# Patient Record
Sex: FEMALE | Race: WHITE | NOT HISPANIC OR LATINO | Employment: OTHER | URBAN - METROPOLITAN AREA
[De-identification: names, ages, dates, MRNs, and addresses within clinical notes are randomized per-mention and may not be internally consistent; named-entity substitution may affect disease eponyms.]

---

## 2017-01-25 RX ORDER — INSULIN PUMP SYRINGE, 3 ML
EACH MISCELLANEOUS
Qty: 1 EACH | Refills: 0 | Status: SHIPPED | OUTPATIENT
Start: 2017-01-25

## 2017-01-25 NOTE — TELEPHONE ENCOUNTER
----- Message from Yaima Joseph sent at 1/25/2017 10:00 AM CST -----  Would like to speak to nurse about diabetic supplies. Please call back at [phone]. Thanks//cdb

## 2017-02-06 RX ORDER — METFORMIN HYDROCHLORIDE 500 MG/1
TABLET, EXTENDED RELEASE ORAL
Qty: 360 TABLET | Refills: 0 | Status: SHIPPED | OUTPATIENT
Start: 2017-02-06 | End: 2017-04-04 | Stop reason: SDUPTHER

## 2017-02-15 ENCOUNTER — CLINICAL SUPPORT (OUTPATIENT)
Dept: INTERNAL MEDICINE | Facility: CLINIC | Age: 72
End: 2017-02-15
Payer: MEDICARE

## 2017-02-15 DIAGNOSIS — E11.8 CONTROLLED TYPE 2 DIABETES MELLITUS WITH COMPLICATION, WITHOUT LONG-TERM CURRENT USE OF INSULIN: ICD-10-CM

## 2017-02-15 LAB
ANION GAP SERPL CALC-SCNC: 8 MMOL/L
BUN SERPL-MCNC: 13 MG/DL
CALCIUM SERPL-MCNC: 9.3 MG/DL
CHLORIDE SERPL-SCNC: 103 MMOL/L
CO2 SERPL-SCNC: 30 MMOL/L
CREAT SERPL-MCNC: 0.7 MG/DL
EST. GFR  (AFRICAN AMERICAN): >60 ML/MIN/1.73 M^2
EST. GFR  (NON AFRICAN AMERICAN): >60 ML/MIN/1.73 M^2
GLUCOSE SERPL-MCNC: 197 MG/DL
POTASSIUM SERPL-SCNC: 4.4 MMOL/L
SODIUM SERPL-SCNC: 141 MMOL/L

## 2017-02-15 PROCEDURE — 83036 HEMOGLOBIN GLYCOSYLATED A1C: CPT

## 2017-02-15 PROCEDURE — 36415 COLL VENOUS BLD VENIPUNCTURE: CPT | Mod: S$GLB,,, | Performed by: FAMILY MEDICINE

## 2017-02-15 PROCEDURE — 99999 PR PBB SHADOW E&M-EST. PATIENT-LVL I: CPT | Mod: PBBFAC,,,

## 2017-02-15 PROCEDURE — 80048 BASIC METABOLIC PNL TOTAL CA: CPT

## 2017-02-15 NOTE — PROGRESS NOTES
Labs drawn today, tolerated well. Informed will call back with results, verbalized understanding.

## 2017-02-16 LAB
ESTIMATED AVG GLUCOSE: 117 MG/DL
HBA1C MFR BLD HPLC: 5.7 %

## 2017-04-04 RX ORDER — METFORMIN HYDROCHLORIDE 500 MG/1
TABLET, EXTENDED RELEASE ORAL
Qty: 360 TABLET | Refills: 0 | Status: SHIPPED | OUTPATIENT
Start: 2017-04-04 | End: 2017-06-13 | Stop reason: SDUPTHER

## 2017-04-04 NOTE — TELEPHONE ENCOUNTER
I sent her refill to [facility] for her metformin.  She had blood work done in January and I said we did not need a recheck on the blood.  However she does need a visit as she is overdue for foot check and other diabetic benchmarks.    Please schedule the patient for a visit to review her recent labs and schedule health maintenance items.

## 2017-04-12 ENCOUNTER — OFFICE VISIT (OUTPATIENT)
Dept: INTERNAL MEDICINE | Facility: CLINIC | Age: 72
End: 2017-04-12
Payer: MEDICARE

## 2017-04-12 VITALS
WEIGHT: 162.06 LBS | BODY MASS INDEX: 25.38 KG/M2 | DIASTOLIC BLOOD PRESSURE: 71 MMHG | HEART RATE: 93 BPM | OXYGEN SATURATION: 94 % | TEMPERATURE: 98 F | SYSTOLIC BLOOD PRESSURE: 114 MMHG

## 2017-04-12 DIAGNOSIS — N32.81 OAB (OVERACTIVE BLADDER): ICD-10-CM

## 2017-04-12 DIAGNOSIS — N39.41 URGE URINARY INCONTINENCE: ICD-10-CM

## 2017-04-12 DIAGNOSIS — E78.2 MIXED HYPERLIPIDEMIA: ICD-10-CM

## 2017-04-12 DIAGNOSIS — E11.8 CONTROLLED TYPE 2 DIABETES MELLITUS WITH COMPLICATION, WITHOUT LONG-TERM CURRENT USE OF INSULIN: Primary | ICD-10-CM

## 2017-04-12 DIAGNOSIS — G47.00 INSOMNIA, UNSPECIFIED TYPE: ICD-10-CM

## 2017-04-12 DIAGNOSIS — E11.3293 CONTROLLED TYPE 2 DIABETES MELLITUS WITH BOTH EYES AFFECTED BY MILD NONPROLIFERATIVE RETINOPATHY WITHOUT MACULAR EDEMA, WITHOUT LONG-TERM CURRENT USE OF INSULIN: ICD-10-CM

## 2017-04-12 DIAGNOSIS — Z23 IMMUNIZATION DUE: ICD-10-CM

## 2017-04-12 PROCEDURE — 1160F RVW MEDS BY RX/DR IN RCRD: CPT | Mod: S$GLB,,, | Performed by: FAMILY MEDICINE

## 2017-04-12 PROCEDURE — 1126F AMNT PAIN NOTED NONE PRSNT: CPT | Mod: S$GLB,,, | Performed by: FAMILY MEDICINE

## 2017-04-12 PROCEDURE — 1159F MED LIST DOCD IN RCRD: CPT | Mod: S$GLB,,, | Performed by: FAMILY MEDICINE

## 2017-04-12 PROCEDURE — 3078F DIAST BP <80 MM HG: CPT | Mod: S$GLB,,, | Performed by: FAMILY MEDICINE

## 2017-04-12 PROCEDURE — 3060F POS MICROALBUMINURIA REV: CPT | Mod: S$GLB,,, | Performed by: FAMILY MEDICINE

## 2017-04-12 PROCEDURE — 3044F HG A1C LEVEL LT 7.0%: CPT | Mod: S$GLB,,, | Performed by: FAMILY MEDICINE

## 2017-04-12 PROCEDURE — 3074F SYST BP LT 130 MM HG: CPT | Mod: S$GLB,,, | Performed by: FAMILY MEDICINE

## 2017-04-12 PROCEDURE — 1157F ADVNC CARE PLAN IN RCRD: CPT | Mod: S$GLB,,, | Performed by: FAMILY MEDICINE

## 2017-04-12 PROCEDURE — 99214 OFFICE O/P EST MOD 30 MIN: CPT | Mod: S$GLB,,, | Performed by: FAMILY MEDICINE

## 2017-04-12 PROCEDURE — G0009 ADMIN PNEUMOCOCCAL VACCINE: HCPCS | Mod: S$GLB,,, | Performed by: FAMILY MEDICINE

## 2017-04-12 PROCEDURE — 90670 PCV13 VACCINE IM: CPT | Mod: S$GLB,,, | Performed by: FAMILY MEDICINE

## 2017-04-12 PROCEDURE — 99999 PR PBB SHADOW E&M-EST. PATIENT-LVL IV: CPT | Mod: PBBFAC,,, | Performed by: FAMILY MEDICINE

## 2017-04-12 RX ORDER — TRAZODONE HYDROCHLORIDE 50 MG/1
50 TABLET ORAL NIGHTLY
Qty: 30 TABLET | Refills: 5 | Status: SHIPPED | OUTPATIENT
Start: 2017-04-12 | End: 2017-04-12 | Stop reason: SDUPTHER

## 2017-04-12 RX ORDER — TRAZODONE HYDROCHLORIDE 50 MG/1
50 TABLET ORAL NIGHTLY
Qty: 90 TABLET | Refills: 1 | Status: SHIPPED | OUTPATIENT
Start: 2017-04-12 | End: 2017-09-15 | Stop reason: SDUPTHER

## 2017-04-12 NOTE — PROGRESS NOTES
Subjective:       Patient ID: Connie Fields is a 71 y.o. female.    Chief Complaint: Annual Exam and Diabetic Foot Exam    HPI Comments: Here for recheck with labs from Feb for DM2 - she will check on vision status with eye doc and request copy sent.  Last record in the chart is from Dr. Anatoliy Espinoza, OD, 41/22/15.  At that time she was found to have mild nonproliferative diabetic retinopathy bilaterally OS>OD, along with age-related cataracts and age-related macular degeneration bilaterally.  Also get her gyn test results form 2016.  She started probiotics and has had great success with the diarrhea - so she stayed on full dose of metformin. A1c remains within normal range.     Labs reviewed:  Lab Results       Component                Value               Date                       WBC                      7.91                11/16/2016                 HGB                      14.5                11/16/2016                 HCT                      42.6                11/16/2016                 PLT                      287                 11/16/2016                 CHOL                     150                 07/29/2016                 TRIG                     160 (H)             07/29/2016                 HDL                      44                  07/29/2016                 LDL                      74                  07/29/16       ALT                      20                  11/16/2016                 AST                      23                  11/16/2016                 NA                       141                 02/15/2017                 K                        4.4                 02/15/2017                 CL                       103                 02/15/2017                 CREATININE               0.7                 02/15/2017                 BUN                      13                  02/15/2017                 CO2                      30 (H)              02/15/2017                 HGBA1C                    5.7                 02/15/2017                    Diabetes   She presents for her follow-up diabetic visit. She has type 2 diabetes mellitus. There are no hypoglycemic associated symptoms. There are no diabetic associated symptoms. Pertinent negatives for diabetes include no foot ulcerations and no polyuria. There are no hypoglycemic complications. Symptoms are stable. Current diabetic treatment includes oral agent (monotherapy). She is following a generally healthy diet. There is no change in her home blood glucose trend. Her breakfast blood glucose range is generally 110-130 mg/dl. An ACE inhibitor/angiotensin II receptor blocker is being taken. Eye exam is current.     Review of Systems   Endocrine: Negative for polyuria.   Genitourinary: Positive for enuresis. Negative for dysuria.   Neurological: Positive for syncope (a month ago at double header - checked by EMS - , BP OK). Negative for numbness.       Objective:      Physical Exam   Constitutional: She is oriented to person, place, and time. She appears well-developed and well-nourished.   HENT:   Head: Normocephalic and atraumatic.   Right Ear: External ear normal.   Left Ear: External ear normal.   Mouth/Throat: Oropharynx is clear and moist. No oropharyngeal exudate.   Neck: Normal range of motion. Neck supple.   Cardiovascular: Normal rate, regular rhythm and normal heart sounds.    Pulses:       Dorsalis pedis pulses are 2+ on the right side, and 2+ on the left side.        Posterior tibial pulses are 1+ on the right side, and 1+ on the left side.   Pulmonary/Chest: Effort normal and breath sounds normal.   Abdominal: Soft. Bowel sounds are normal. She exhibits no distension. There is no tenderness.   Musculoskeletal:        Right foot: There is normal range of motion and no deformity.        Left foot: There is normal range of motion and no deformity.   Feet:   Right Foot:   Protective Sensation: 10 sites tested. 10 sites sensed.    Skin Integrity: Negative for ulcer or skin breakdown.   Left Foot:   Protective Sensation: 10 sites tested. 10 sites sensed.   Skin Integrity: Negative for ulcer or skin breakdown.   Neurological: She is alert and oriented to person, place, and time.   Skin: Skin is warm and dry.   Psychiatric: She has a normal mood and affect. Her behavior is normal.         Assessment/Plan:     1. Controlled type 2 diabetes mellitus with complication, without long-term current use of insulin  Hemoglobin A1c    Microalbumin/creatinine urine ratio    Comprehensive metabolic panel   2. Urge urinary incontinence  Ambulatory referral to Urology   3. OAB (overactive bladder)  Ambulatory referral to Urology   4. Immunization due  Pneumococcal Conjugate Vaccine (13 Valent) (IM)   5. Insomnia, unspecified type  trazodone (DESYREL) 50 MG tablet   6. Mixed hyperlipidemia  Lipid panel   continue all current meds and recheck with 6 month labs in Augst.   New start trazodone - start with one half tab nightly for sleep onset.  She declines further immunizations other than Prevnar today.

## 2017-04-12 NOTE — MR AVS SNAPSHOT
[facility] [address]  [place] [address]  Phone: [phone]  Fax: [phone]                  Connie Fields   2017 10:40 AM   Office Visit    Description:  Female : 1945   Provider:  Ivana Ash MD   Department:  [facility]           Reason for Visit     Annual Exam     Diabetic Foot Exam           Diagnoses this Visit        Comments    Controlled type 2 diabetes mellitus with complication, without long-term current use of insulin    -  Primary     Urge urinary incontinence         OAB (overactive bladder)         Immunization due         Insomnia, unspecified type         Mixed hyperlipidemia                To Do List           Goals (5 Years of Data)     None       These Medications        Disp Refills Start End    trazodone (DESYREL) 50 MG tablet 30 tablet 5 2017    Take 1 tablet (50 mg total) by mouth every evening. - Oral    Pharmacy: [facility] - [address] Ph #: [phone]         [facility]     [facility] On Call Nurse Care Line -  Assistance  Unless otherwise directed by your provider, please contact [facility], our nurse care line that is available for  assistance.     Registered nurses in the [facility] Center provide: appointment scheduling, clinical advisement, health education, and other advisory services.  Call: [phone] (toll free)               Medications           START taking these NEW medications        Refills    trazodone (DESYREL) 50 MG tablet 5    Sig: Take 1 tablet (50 mg total) by mouth every evening.    Class: Normal    Route: Oral           Verify that the below list of medications is an accurate representation of the medications you are currently taking.  If none reported, the list may be blank. If incorrect, please contact your healthcare provider. Carry this list with you in case of emergency.           Current  Medications     aspirin (ECOTRIN) 81 MG EC tablet Take 81 mg by mouth once daily.    b complex vitamins capsule Take 1 capsule by mouth once daily.    blood sugar diagnostic (BLOOD GLUCOSE TEST) Strp 1 each by Misc.(Non-Drug; Combo Route) route once daily. One touch verio IQ    blood-glucose meter kit Use as instructed    cholecalciferol, vitamin D3, (VITAMIN D3) 1,000 unit capsule Take 1,000 Units by mouth once daily.    clonazePAM (KLONOPIN) 1 MG tablet 1/2 to 1 up to BID prn severe anxiety    hydrocodone-acetaminophen 7.5-325mg (NORCO) 7.5-325 mg per tablet TK 1 T PO  Q 4 TO 6 H PRN FOR SEVERE PAIN ONLY    Lactobacillus rhamnosus GG (CULTURELLE) 10 billion cell capsule Take 1 capsule by mouth once daily.    lancets (ONE TOUCH DELICA LANCETS) 33 gauge Misc 1 lancet by Misc.(Non-Drug; Combo Route) route 2 (two) times daily.    metformin (GLUCOPHAGE-XR) 500 MG 24 hr tablet TAKE 2 TABLETS TWICE DAILY WITH MEALS    multivitamin capsule Take 1 capsule by mouth once daily.    oxybutynin (DITROPAN) 5 MG Tab Take 1 tablet (5 mg total) by mouth 2 (two) times daily.    pantoprazole (PROTONIX) 40 MG tablet Take 1 tablet (40 mg total) by mouth 2 (two) times daily.    paroxetine (PAXIL) 30 MG tablet Take 1 tablet (30 mg total) by mouth every morning.    pravastatin (PRAVACHOL) 40 MG tablet Take 1 tablet (40 mg total) by mouth once daily.    trazodone (DESYREL) 50 MG tablet Take 1 tablet (50 mg total) by mouth every evening.           Clinical Reference Information           Your Vitals Were     BP Pulse Temp    114/71 (BP Location: Right arm, Patient Position: Sitting, BP Method: Automatic) 93 97.7 °F (36.5 °C) (Tympanic)    Weight SpO2 BMI    73.5 kg (162 lb 0.6 oz) 94% 25.38 kg/m2      Blood Pressure          Most Recent Value    BP  114/71      Allergies as of 4/12/2017     Hydrocodone      Immunizations Administered on Date of Encounter - 4/12/2017     Name Date Dose VIS Date Route    Pneumococcal Conjugate - 13 Valent  4/12/2017 0.5 mL 11/5/2015 Intramuscular      Orders Placed During Today's Visit      Normal Orders This Visit    Ambulatory referral to Urology     Pneumococcal Conjugate Vaccine (13 Valent) (IM)     Future Labs/Procedures Expected by Expires    Comprehensive metabolic panel  8/12/2017 (Approximate) 4/13/2018    Hemoglobin A1c  8/12/2017 (Approximate) 4/13/2018    Lipid panel  8/12/2017 (Approximate) 4/13/2018    Microalbumin/creatinine urine ratio  8/12/2017 4/12/2018      [facility] Sign-Up     Activating your [facility] account is as easy as 1-2-3!     1) Visit [email or web address], select Sign Up Now, enter this activation code and your date of birth, then select Next.  [email or web address]  Expires: 5/27/2017 12:39 PM      2) Create a username and password to use when you visit [facility] in the future and select a security question in case you lose your password and select Next.    3) Enter your e-mail address and click Sign Up!    Additional Information  If you have questions, please e-mail [email or web address] or call [phone] to talk to our [facility] staff. Remember, [facility] is NOT to be used for urgent needs. For medical emergencies, dial 911.         Language Assistance Services     ATTENTION: Language assistance services are available, free of charge. Please call [phone].      ATENCIÓN: Si habla español, tiene a swanson disposición servicios gratuitos de asistencia lingüística. Llame al [phone].     [facility] Ý: N?u b?n nói Ti?ng Vi?t, có các d?ch v? h? tr? ngôn ng? mi?n phí dành cho b?n. G?i s? [phone].         [facility] complies with applicable Federal civil rights laws and does not discriminate on the basis of race, color, national origin, age, disability, or sex.

## 2017-04-12 NOTE — PROGRESS NOTES
Instructed the patient to stay in the clinic for 15 minutes after the injections/ vaccine prevnar 13

## 2017-05-02 DIAGNOSIS — N39.46 MIXED STRESS AND URGE URINARY INCONTINENCE: ICD-10-CM

## 2017-05-02 RX ORDER — OXYBUTYNIN CHLORIDE 5 MG/1
TABLET ORAL
Qty: 180 TABLET | Refills: 1 | Status: SHIPPED | OUTPATIENT
Start: 2017-05-02 | End: 2018-02-06 | Stop reason: SDUPTHER

## 2017-06-13 RX ORDER — METFORMIN HYDROCHLORIDE 500 MG/1
TABLET, EXTENDED RELEASE ORAL
Qty: 360 TABLET | Refills: 3 | Status: SHIPPED | OUTPATIENT
Start: 2017-06-13 | End: 2018-03-22 | Stop reason: SDUPTHER

## 2017-08-09 ENCOUNTER — TELEPHONE (OUTPATIENT)
Dept: INTERNAL MEDICINE | Facility: CLINIC | Age: 72
End: 2017-08-09

## 2017-08-09 NOTE — TELEPHONE ENCOUNTER
Called pt to see about rescheduling her lab appointment. Pt rescheduled her appointment for 8/10/17 for 8:40 am

## 2017-08-10 ENCOUNTER — CLINICAL SUPPORT (OUTPATIENT)
Dept: INTERNAL MEDICINE | Facility: CLINIC | Age: 72
End: 2017-08-10
Payer: MEDICARE

## 2017-08-10 DIAGNOSIS — E78.2 MIXED HYPERLIPIDEMIA: ICD-10-CM

## 2017-08-10 DIAGNOSIS — E11.8 CONTROLLED TYPE 2 DIABETES MELLITUS WITH COMPLICATION, WITHOUT LONG-TERM CURRENT USE OF INSULIN: ICD-10-CM

## 2017-08-10 LAB
ALBUMIN SERPL BCP-MCNC: 3.9 G/DL
ALP SERPL-CCNC: 53 U/L
ALT SERPL W/O P-5'-P-CCNC: 16 U/L
ANION GAP SERPL CALC-SCNC: 10 MMOL/L
AST SERPL-CCNC: 23 U/L
BILIRUB SERPL-MCNC: 1.2 MG/DL
BUN SERPL-MCNC: 12 MG/DL
CALCIUM SERPL-MCNC: 9.9 MG/DL
CHLORIDE SERPL-SCNC: 102 MMOL/L
CHOLEST/HDLC SERPL: 3 {RATIO}
CO2 SERPL-SCNC: 28 MMOL/L
CREAT SERPL-MCNC: 0.8 MG/DL
CREAT UR-MCNC: 78 MG/DL
EST. GFR  (AFRICAN AMERICAN): >60 ML/MIN/1.73 M^2
EST. GFR  (NON AFRICAN AMERICAN): >60 ML/MIN/1.73 M^2
ESTIMATED AVG GLUCOSE: 114 MG/DL
GLUCOSE SERPL-MCNC: 111 MG/DL
HBA1C MFR BLD HPLC: 5.6 %
HDL/CHOLESTEROL RATIO: 32.8 %
HDLC SERPL-MCNC: 131 MG/DL
HDLC SERPL-MCNC: 43 MG/DL
LDLC SERPL CALC-MCNC: 49.6 MG/DL
MICROALBUMIN UR DL<=1MG/L-MCNC: 9 UG/ML
MICROALBUMIN/CREATININE RATIO: 11.5 UG/MG
NONHDLC SERPL-MCNC: 88 MG/DL
POTASSIUM SERPL-SCNC: 4.9 MMOL/L
PROT SERPL-MCNC: 6.7 G/DL
SODIUM SERPL-SCNC: 140 MMOL/L
TRIGL SERPL-MCNC: 192 MG/DL

## 2017-08-10 PROCEDURE — 83036 HEMOGLOBIN GLYCOSYLATED A1C: CPT

## 2017-08-10 PROCEDURE — 80053 COMPREHEN METABOLIC PANEL: CPT

## 2017-08-10 PROCEDURE — 99999 PR PBB SHADOW E&M-EST. PATIENT-LVL I: CPT | Mod: PBBFAC,,,

## 2017-08-10 PROCEDURE — 82570 ASSAY OF URINE CREATININE: CPT

## 2017-08-10 PROCEDURE — 36415 COLL VENOUS BLD VENIPUNCTURE: CPT | Mod: S$GLB,,, | Performed by: FAMILY MEDICINE

## 2017-08-10 PROCEDURE — 80061 LIPID PANEL: CPT

## 2017-08-23 ENCOUNTER — TELEPHONE (OUTPATIENT)
Dept: INTERNAL MEDICINE | Facility: CLINIC | Age: 72
End: 2017-08-23

## 2017-08-23 NOTE — TELEPHONE ENCOUNTER
----- Message from Sara Francis sent at 8/23/2017  8:37 AM CDT -----  Please call pt back about her test results...please call pt back at [phone].

## 2017-08-23 NOTE — TELEPHONE ENCOUNTER
Returned the pt phone call. Advised her of her results and informed her that an appointment needs to be made to go over her results and to adjust her medications. Pt verbalized understanding of the information given. Pt also scheduled her appointment for mid September.

## 2017-09-01 ENCOUNTER — PATIENT OUTREACH (OUTPATIENT)
Dept: ADMINISTRATIVE | Facility: HOSPITAL | Age: 72
End: 2017-09-01

## 2017-09-01 NOTE — LETTER
2017      We are seeing Connie Fields,  1945, at [facility]. Ivana Ash MD is their primary care physician. To help with our [place] maintenance records could you please send the following:     RECENT DIABETIC EYE EXAM    Please fax to [facility] at [phone], attention CARE COORDINATION DEPARTMENT.     Thank-you in advance for your assistance. If you have any questions or concerns please feel free to call.     Tania MABRY LPN Care Coordinator  Care Coordination Department  [facility]  [phone]

## 2017-09-01 NOTE — LETTER
September 1, 2017    Connie Fields  [address] [place] [address]             [facility]  [address]  [facility] [address]  Phone: [phone] Dear Mrs. Fields:    [facility] is committed to your overall health.  To help you get the most out of each of your visits, we will review your information to make sure you are up to date on all of your recommended tests and/or procedures.      Ivana Ash MD has found that you may be due for   Health Maintenance Due   Topic    TETANUS VACCINE     Zoster Vaccine     Mammogram     Influenza Vaccine         If you have had any of the above done at another facility, please bring the records or information with you so that your record at [facility] will be complete.    If you are currently taking medication, please bring it with you to your appointment for review.    We will be happy to assist you with scheduling any necessary appointments or you may contact the [facility] appointment desk at [phone] to schedule at your convenience.     Thank you for choosing [facility] for your healthcare needs.  If you have any questions or concerns, please don't hesitate to call.    Sincerely,  Tania MABRY LPN Care Coordinator  [facility]  [phone]

## 2017-09-15 ENCOUNTER — OFFICE VISIT (OUTPATIENT)
Dept: INTERNAL MEDICINE | Facility: CLINIC | Age: 72
End: 2017-09-15
Payer: MEDICARE

## 2017-09-15 VITALS
SYSTOLIC BLOOD PRESSURE: 127 MMHG | RESPIRATION RATE: 21 BRPM | HEIGHT: 65 IN | BODY MASS INDEX: 26.54 KG/M2 | HEART RATE: 80 BPM | WEIGHT: 159.31 LBS | TEMPERATURE: 98 F | DIASTOLIC BLOOD PRESSURE: 80 MMHG

## 2017-09-15 DIAGNOSIS — Z78.0 ASYMPTOMATIC MENOPAUSAL STATE: ICD-10-CM

## 2017-09-15 DIAGNOSIS — E11.8 CONTROLLED TYPE 2 DIABETES MELLITUS WITH COMPLICATION, WITHOUT LONG-TERM CURRENT USE OF INSULIN: Primary | ICD-10-CM

## 2017-09-15 DIAGNOSIS — Z12.31 ENCOUNTER FOR SCREENING MAMMOGRAM FOR BREAST CANCER: ICD-10-CM

## 2017-09-15 DIAGNOSIS — E78.2 MIXED HYPERLIPIDEMIA: ICD-10-CM

## 2017-09-15 DIAGNOSIS — K21.9 GASTROESOPHAGEAL REFLUX DISEASE WITHOUT ESOPHAGITIS: ICD-10-CM

## 2017-09-15 DIAGNOSIS — G47.00 INSOMNIA, UNSPECIFIED TYPE: ICD-10-CM

## 2017-09-15 PROCEDURE — 99499 UNLISTED E&M SERVICE: CPT | Mod: S$GLB,,, | Performed by: FAMILY MEDICINE

## 2017-09-15 PROCEDURE — 3079F DIAST BP 80-89 MM HG: CPT | Mod: S$GLB,,, | Performed by: FAMILY MEDICINE

## 2017-09-15 PROCEDURE — 1159F MED LIST DOCD IN RCRD: CPT | Mod: S$GLB,,, | Performed by: FAMILY MEDICINE

## 2017-09-15 PROCEDURE — 3044F HG A1C LEVEL LT 7.0%: CPT | Mod: S$GLB,,, | Performed by: FAMILY MEDICINE

## 2017-09-15 PROCEDURE — 1126F AMNT PAIN NOTED NONE PRSNT: CPT | Mod: S$GLB,,, | Performed by: FAMILY MEDICINE

## 2017-09-15 PROCEDURE — 3074F SYST BP LT 130 MM HG: CPT | Mod: S$GLB,,, | Performed by: FAMILY MEDICINE

## 2017-09-15 PROCEDURE — 99215 OFFICE O/P EST HI 40 MIN: CPT | Mod: S$GLB,,, | Performed by: FAMILY MEDICINE

## 2017-09-15 PROCEDURE — 3008F BODY MASS INDEX DOCD: CPT | Mod: S$GLB,,, | Performed by: FAMILY MEDICINE

## 2017-09-15 PROCEDURE — 99999 PR PBB SHADOW E&M-EST. PATIENT-LVL IV: CPT | Mod: PBBFAC,,, | Performed by: FAMILY MEDICINE

## 2017-09-15 RX ORDER — PANTOPRAZOLE SODIUM 40 MG/1
40 TABLET, DELAYED RELEASE ORAL DAILY
Qty: 90 TABLET | Refills: 1 | Status: SHIPPED | OUTPATIENT
Start: 2017-09-15 | End: 2018-03-19

## 2017-09-15 RX ORDER — AMOXICILLIN 500 MG
2 CAPSULE ORAL DAILY
COMMUNITY

## 2017-09-15 RX ORDER — DIAPER,BRIEF,ADULT, DISPOSABLE
500 EACH MISCELLANEOUS DAILY
COMMUNITY

## 2017-09-15 RX ORDER — PRAVASTATIN SODIUM 40 MG/1
TABLET ORAL
Qty: 90 TABLET | Refills: 1 | Status: SHIPPED | OUTPATIENT
Start: 2017-09-15 | End: 2018-03-19 | Stop reason: SDUPTHER

## 2017-09-15 RX ORDER — TRAZODONE HYDROCHLORIDE 50 MG/1
50 TABLET ORAL NIGHTLY
Qty: 90 TABLET | Refills: 3 | Status: SHIPPED | OUTPATIENT
Start: 2017-09-15 | End: 2018-07-30 | Stop reason: SDUPTHER

## 2017-09-15 RX ORDER — LANOLIN ALCOHOL/MO/W.PET/CERES
100 CREAM (GRAM) TOPICAL DAILY
COMMUNITY

## 2017-09-15 RX ORDER — FAMOTIDINE 40 MG/1
40 TABLET, FILM COATED ORAL DAILY
Qty: 90 TABLET | Refills: 1 | Status: SHIPPED | OUTPATIENT
Start: 2017-09-15 | End: 2018-03-19 | Stop reason: SDUPTHER

## 2017-09-15 NOTE — PATIENT INSTRUCTIONS
Lifestyle Changes for Controlling GERD  When you have GERD, stomach acid feels as if its backing up toward your mouth. Whether or not you take medicine to control your GERD, your symptoms can often be improved with lifestyle changes. Talk to your healthcare provider about the following suggestions. These suggestions may help you get relief from your symptoms.      Raise your head  Reflux is more likely to strike when youre lying down flat, because stomach fluid can flow backward more easily. Raising the head of your bed 4 to 6 inches can help. To do this:  · Slide blocks or books under the legs at the head of your bed. Or, place a wedge under the mattress. Many [employer] can make a suitable wedge for you. The wedge should run from your waist to the top of your head.  · Dont just prop your head on several pillows. This increases pressure on your stomach. It can make GERD worse.  Watch your eating habits  Certain foods may increase the acid in your stomach or relax the lower esophageal sphincter. This makes GERD more likely. Its best to avoid the following if they cause you symptoms:  · Coffee, tea, and carbonated drinks (with and without caffeine)  · Fatty, fried, or spicy food  · Mint, chocolate, onions, and tomatoes  · Peppermint  · Any other foods that seem to irritate your stomach or cause you pain  Relieve the pressure  Tips include the following:  · Eat smaller meals, even if you have to eat more often.  · Dont lie down right after you eat. Wait a few hours for your stomach to empty.  · Avoid tight belts and tight-fitting clothes.  · Lose excess weight.  Tobacco and alcohol  Avoid smoking tobacco and drinking alcohol. They can make GERD symptoms worse.  Date Last Reviewed: 7/1/2016  © 3606-2030 [employer]. [address] [place] [address], [place] [address]. All rights reserved. This information is not intended as a substitute for professional medical care. Always follow your healthcare  professional's instructions.

## 2017-09-15 NOTE — PROGRESS NOTES
Subjective:       Patient ID: Connie Fields is a 72 y.o. female.    Chief Complaint: discuss labs    Here for recheck DM2, HLD, GERD - recent labs reviewed in depth.  Lab Results       Component                Value               Date                       WBC                      7.91                11/16/2016                 HGB                      14.5                11/16/2016                 HCT                      42.6                11/16/2016                 PLT                      287                 11/16/2016                 CHOL                     131                 08/10/2017                 TRIG                     192 (H)             08/10/2017                 HDL                      43                  08/10/2017                 LDLCALC                  49.6 (L)            08/10/2017                 ALT                      16                  08/10/2017                 AST                      23                  08/10/2017                 NA                       140                 08/10/2017                 K                        4.9                 08/10/2017                 CL                       102                 08/10/2017                 CALCIUM                  9.9                 08/10/2017                 CREATININE               0.8                 08/10/2017                 BUN                      12                  08/10/2017                 CO2                      28                  08/10/2017                 GLU                      111 (H)             08/10/2017                 ESTGFRAFRICA             >60.0               08/10/2017                 EGFRNONAA                >60.0               08/10/2017                 HGBA1C                   5.6                 08/10/2017                 MICALBCREAT              11.5                08/10/2017            Excellent continued DM controll on max metformin/diet/note wt loss over last year. Occasional diarrhea -  reviewed precautions/recs with metformin.  Lipids with LDL 49.  She got off PPI couple months ago and is using OTC prilosec but still symptomatic.       Diabetes   She presents for her follow-up diabetic visit. She has type 2 diabetes mellitus. There are no hypoglycemic associated symptoms. Associated symptoms include fatigue (a little). Pertinent negatives for diabetes include no chest pain, no foot paresthesias, no foot ulcerations and no visual change. There are no hypoglycemic complications. Symptoms are stable. Diabetic complications include retinopathy (early changes per pt). Current diabetic treatment includes oral agent (monotherapy). She is compliant with treatment all of the time. Her weight is decreasing steadily. She is following a generally healthy diet. Meal planning includes avoidance of concentrated sweets. Exercise: approx 2 x week - stationary bike or wlking. Home blood sugar record trend: no BS checks - well ocntrolled. An ACE inhibitor/angiotensin II receptor blocker is not being taken. Eye exam is not current.     Review of Systems   Constitutional: Positive for fatigue (a little).   Respiratory: Positive for cough (dwayne at night - thinks due to PND). Negative for chest tightness and shortness of breath.    Cardiovascular: Negative for chest pain, palpitations and leg swelling.       Objective:      Physical Exam   Constitutional: She is oriented to person, place, and time. She appears well-developed.   HENT:   Head: Normocephalic and atraumatic.   Cardiovascular: Normal rate, regular rhythm and normal heart sounds.    Pulmonary/Chest: Effort normal and breath sounds normal.   Neurological: She is alert and oriented to person, place, and time.   Skin: Skin is warm and dry.   Psychiatric: She has a normal mood and affect. Her behavior is normal.         Assessment/Plan:     1. Controlled type 2 diabetes mellitus with complication, without long-term current use of insulin  Ambulatory referral to  Ophthalmology    Hemoglobin A1c    Comprehensive metabolic panel    CBC auto differential   2. Gastroesophageal reflux disease without esophagitis  pantoprazole (PROTONIX) 40 MG tablet    famotidine (PEPCID) 40 MG tablet   3. Mixed hyperlipidemia  pravastatin (PRAVACHOL) 40 MG tablet    Lipid panel   4. Insomnia, unspecified type  trazodone (DESYREL) 50 MG tablet   5. Asymptomatic menopausal state  DXA Bone Density Spine And Hip   6. Encounter for screening mammogram for breast cancer  Mammo Digital Screening Bilat with CAD     Will decrease pravastatin to 1/2 tab daily and recheck 6 months with DM labs.   Continue all other meds as taking.  Discussed recs re decreasing PPI to D0Ocollbf if possible - she will get back on pantoprazole and experiment with taper - very slowly.  More than 50% of visit was spent in counseling regarding options, recommendations, medication use, side effects, expectations, and precautions.  Total time spent face-to-face was 50 minutes.

## 2017-09-16 PROBLEM — E11.8 CONTROLLED TYPE 2 DIABETES MELLITUS WITH COMPLICATION, WITHOUT LONG-TERM CURRENT USE OF INSULIN: Status: ACTIVE | Noted: 2017-09-16

## 2017-10-02 ENCOUNTER — TELEPHONE (OUTPATIENT)
Dept: RADIOLOGY | Facility: HOSPITAL | Age: 72
End: 2017-10-02

## 2017-10-03 ENCOUNTER — HOSPITAL ENCOUNTER (OUTPATIENT)
Dept: RADIOLOGY | Facility: HOSPITAL | Age: 72
Discharge: HOME | End: 2017-10-03
Attending: FAMILY MEDICINE
Payer: MEDICARE

## 2017-10-03 VITALS — HEIGHT: 65 IN | BODY MASS INDEX: 26.49 KG/M2 | WEIGHT: 159 LBS

## 2017-10-03 DIAGNOSIS — Z12.31 ENCOUNTER FOR SCREENING MAMMOGRAM FOR BREAST CANCER: ICD-10-CM

## 2017-10-03 PROCEDURE — 77067 SCR MAMMO BI INCL CAD: CPT | Mod: 26,,, | Performed by: RADIOLOGY

## 2017-10-03 PROCEDURE — 77067 SCR MAMMO BI INCL CAD: CPT | Mod: TC

## 2017-10-10 ENCOUNTER — OFFICE VISIT (OUTPATIENT)
Dept: OPHTHALMOLOGY | Facility: CLINIC | Age: 72
End: 2017-10-10
Payer: MEDICARE

## 2017-10-10 DIAGNOSIS — E11.9 DIABETES MELLITUS TYPE 2 WITHOUT RETINOPATHY: Primary | ICD-10-CM

## 2017-10-10 DIAGNOSIS — H25.013 CORTICAL AGE-RELATED CATARACT OF BOTH EYES: ICD-10-CM

## 2017-10-10 DIAGNOSIS — H31.001 CHORIORETINAL SCAR OF RIGHT EYE: ICD-10-CM

## 2017-10-10 DIAGNOSIS — H25.13 NUCLEAR SCLEROSIS OF BOTH EYES: ICD-10-CM

## 2017-10-10 PROCEDURE — 92004 COMPRE OPH EXAM NEW PT 1/>: CPT | Mod: S$GLB,,, | Performed by: OPHTHALMOLOGY

## 2017-10-10 PROCEDURE — 99999 PR PBB SHADOW E&M-EST. PATIENT-LVL II: CPT | Mod: PBBFAC,,, | Performed by: OPHTHALMOLOGY

## 2017-10-10 PROCEDURE — 99499 UNLISTED E&M SERVICE: CPT | Mod: S$GLB,,, | Performed by: OPHTHALMOLOGY

## 2017-10-10 NOTE — PROGRESS NOTES
SUBJECTIVE:   Connie Fields is a 72 y.o. female   Uncorrected distance visual acuity was 20/50 in the right eye and 20/50 +1 in the left eye.   Chief Complaint   Patient presents with    Diabetic Eye Exam        HPI:  HPI     Diabetic Yearly Eye Exam  No changes noted with [facility]readers only  No pain or discomfort  PCP: DONI Ash    DM 2014    Last edited by Jenna Valenzuela on 10/10/2017  9:56 AM. (History)        Assessment /Plan :  1. Diabetes mellitus type 2 without retinopathy No diabetic retinopathy at this time. Reviewed diabetic eye precautions including avoiding tobacco use,  Good glucose control, and importance of regular follow up.    2. Nuclear sclerosis of both eyes Patient does reports mild visual decline from incipient cataractous changes, but not sufficient to affect activities of daily living. I recommend monitoring visual status and follow up when visual symptoms worsen.     3. Cortical age-related cataract of both eyes Patient does reports mild visual decline from incipient cataractous changes, but not sufficient to affect activities of daily living. I recommend monitoring visual status and follow up when visual symptoms worsen.     4.      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

## 2017-10-10 NOTE — LETTER
October 10, 2017      Ivana Ash MD  [address] [place] [address] [address] [place] [address]           '[facility]  [address] [place] [address]  [place] [address]  Phone: [phone]  Fax: [phone]          Patient: Connie Fields   MR Number: [id number]   YOB: 1945   Date of Visit: 10/10/2017       Dear Dr. Ivana Ash:    Thank you for referring Connie Fields to me for evaluation. Attached you will find relevant portions of my assessment and plan of care.    If you have questions, please do not hesitate to call me. I look forward to following Connie Fields along with you.    Sincerely,    Juventino Higuera MD    Enclosure  CC:  No Recipients    If you would like to receive this communication electronically, please contact [email or web address] or [phone] to request more information on [employer] Link access.    For providers and/or their staff who would like to refer a patient to [facility], please contact us through our one-stop-shop provider referral line, [facility] [place], at [phone].    If you feel you have received this communication in error or would no longer like to receive these types of communications, please e-mail [email or web address]

## 2017-12-06 DIAGNOSIS — F41.9 ANXIETY: ICD-10-CM

## 2017-12-06 NOTE — TELEPHONE ENCOUNTER
----- Message from Omari Hatch sent at 12/6/2017  8:47 AM CST -----  Contact: Pt   States she's calling rg having some questions about her medicine and states that Maile has tried to contact and no response and wants to know if her medicine can be refilled and sent back and can be reached at [phone]//prudence/brittney  Pt works from 10 - 4 and wants to be called in prior to going to work

## 2017-12-06 NOTE — TELEPHONE ENCOUNTER
Patient is calling in regards to having her Rx to go through [facility]. Patient is stating that she is needing a refill on all her Rx. Patient last seen on 9/15/17. Some of her Rx she still has refills on.

## 2017-12-07 RX ORDER — PAROXETINE 30 MG/1
30 TABLET, FILM COATED ORAL EVERY MORNING
Qty: 90 TABLET | Refills: 3 | Status: SHIPPED | OUTPATIENT
Start: 2017-12-07 | End: 2018-11-07 | Stop reason: SDUPTHER

## 2018-02-06 DIAGNOSIS — E78.2 MIXED HYPERLIPIDEMIA: ICD-10-CM

## 2018-02-06 DIAGNOSIS — N39.46 MIXED STRESS AND URGE URINARY INCONTINENCE: ICD-10-CM

## 2018-02-06 DIAGNOSIS — K21.9 GASTROESOPHAGEAL REFLUX DISEASE WITHOUT ESOPHAGITIS: ICD-10-CM

## 2018-02-07 RX ORDER — PRAVASTATIN SODIUM 40 MG/1
TABLET ORAL
Qty: 90 TABLET | Refills: 1 | OUTPATIENT
Start: 2018-02-07

## 2018-02-07 RX ORDER — PANTOPRAZOLE SODIUM 40 MG/1
TABLET, DELAYED RELEASE ORAL
Qty: 90 TABLET | Refills: 1 | OUTPATIENT
Start: 2018-02-07

## 2018-02-07 RX ORDER — FAMOTIDINE 40 MG/1
TABLET, FILM COATED ORAL
Qty: 90 TABLET | Refills: 1 | OUTPATIENT
Start: 2018-02-07

## 2018-02-07 RX ORDER — OXYBUTYNIN CHLORIDE 5 MG/1
TABLET ORAL
Qty: 180 TABLET | Refills: 1 | Status: SHIPPED | OUTPATIENT
Start: 2018-02-07 | End: 2018-05-30 | Stop reason: SDUPTHER

## 2018-02-27 RX ORDER — METFORMIN HYDROCHLORIDE 500 MG/1
TABLET, EXTENDED RELEASE ORAL
Qty: 360 TABLET | Refills: 3 | Status: CANCELLED | OUTPATIENT
Start: 2018-02-27

## 2018-02-27 NOTE — TELEPHONE ENCOUNTER
----- Message from Shahidkenzie Spann sent at 2/27/2018  9:54 AM CST -----  Contact: [phone]   Pt would  Like to consult with the nurse about getting refills on Rx medication's and on metformin.  Please call back at [phone].  Thx-H           Pt Uses:  .  [id number] - [place] - [address] AT [facility]  [address] [address]  Phone: [phone] Fax: [phone]    [facility] - [place], [place] - [address]  [address]  [facility] [address]  Phone: [phone] Fax: [phone]

## 2018-02-27 NOTE — TELEPHONE ENCOUNTER
Pls contact patient to find out which prescription(s) she wants and which pharmacy she wants them to go to.    (she will be getting labs and seeing me again in 2 weeks)

## 2018-02-28 NOTE — TELEPHONE ENCOUNTER
Reviewed medications with patient.  She was prescribed a years worth last 6/13/17 for metformin from [facility].  She has refills and she will contact [facility] to have them sent.  Her other medications all have refills or are not in danger of running out.  No medications needed.

## 2018-03-05 ENCOUNTER — PATIENT OUTREACH (OUTPATIENT)
Dept: ADMINISTRATIVE | Facility: HOSPITAL | Age: 73
End: 2018-03-05

## 2018-03-15 ENCOUNTER — CLINICAL SUPPORT (OUTPATIENT)
Dept: INTERNAL MEDICINE | Facility: CLINIC | Age: 73
End: 2018-03-15
Payer: MEDICARE

## 2018-03-15 DIAGNOSIS — E11.8 CONTROLLED TYPE 2 DIABETES MELLITUS WITH COMPLICATION, WITHOUT LONG-TERM CURRENT USE OF INSULIN: ICD-10-CM

## 2018-03-15 DIAGNOSIS — E78.2 MIXED HYPERLIPIDEMIA: ICD-10-CM

## 2018-03-15 LAB
ALBUMIN SERPL BCP-MCNC: 3.9 G/DL
ALP SERPL-CCNC: 61 U/L
ALT SERPL W/O P-5'-P-CCNC: 17 U/L
ANION GAP SERPL CALC-SCNC: 6 MMOL/L
AST SERPL-CCNC: 19 U/L
BASOPHILS # BLD AUTO: 0.08 K/UL
BASOPHILS NFR BLD: 0.9 %
BILIRUB SERPL-MCNC: 1 MG/DL
BUN SERPL-MCNC: 13 MG/DL
CALCIUM SERPL-MCNC: 10.1 MG/DL
CHLORIDE SERPL-SCNC: 103 MMOL/L
CHOLEST SERPL-MCNC: 168 MG/DL
CHOLEST/HDLC SERPL: 3.2 {RATIO}
CO2 SERPL-SCNC: 31 MMOL/L
CREAT SERPL-MCNC: 0.7 MG/DL
DIFFERENTIAL METHOD: NORMAL
EOSINOPHIL # BLD AUTO: 0.2 K/UL
EOSINOPHIL NFR BLD: 1.9 %
ERYTHROCYTE [DISTWIDTH] IN BLOOD BY AUTOMATED COUNT: 12.5 %
EST. GFR  (AFRICAN AMERICAN): >60 ML/MIN/1.73 M^2
EST. GFR  (NON AFRICAN AMERICAN): >60 ML/MIN/1.73 M^2
ESTIMATED AVG GLUCOSE: 114 MG/DL
GLUCOSE SERPL-MCNC: 122 MG/DL
HBA1C MFR BLD HPLC: 5.6 %
HCT VFR BLD AUTO: 41.9 %
HDLC SERPL-MCNC: 52 MG/DL
HDLC SERPL: 31 %
HGB BLD-MCNC: 14 G/DL
IMM GRANULOCYTES # BLD AUTO: 0.02 K/UL
IMM GRANULOCYTES NFR BLD AUTO: 0.2 %
LDLC SERPL CALC-MCNC: 94.8 MG/DL
LYMPHOCYTES # BLD AUTO: 2.3 K/UL
LYMPHOCYTES NFR BLD: 25.3 %
MCH RBC QN AUTO: 27.4 PG
MCHC RBC AUTO-ENTMCNC: 33.4 G/DL
MCV RBC AUTO: 82 FL
MONOCYTES # BLD AUTO: 0.7 K/UL
MONOCYTES NFR BLD: 7.2 %
NEUTROPHILS # BLD AUTO: 5.8 K/UL
NEUTROPHILS NFR BLD: 64.5 %
NONHDLC SERPL-MCNC: 116 MG/DL
NRBC BLD-RTO: 0 /100 WBC
PLATELET # BLD AUTO: 299 K/UL
PMV BLD AUTO: 9.5 FL
POTASSIUM SERPL-SCNC: 5.3 MMOL/L
PROT SERPL-MCNC: 7 G/DL
RBC # BLD AUTO: 5.11 M/UL
SODIUM SERPL-SCNC: 140 MMOL/L
TRIGL SERPL-MCNC: 106 MG/DL
WBC # BLD AUTO: 9.01 K/UL

## 2018-03-15 PROCEDURE — 80061 LIPID PANEL: CPT

## 2018-03-15 PROCEDURE — 83036 HEMOGLOBIN GLYCOSYLATED A1C: CPT

## 2018-03-15 PROCEDURE — 85025 COMPLETE CBC W/AUTO DIFF WBC: CPT

## 2018-03-15 PROCEDURE — 80053 COMPREHEN METABOLIC PANEL: CPT

## 2018-03-15 PROCEDURE — 36415 COLL VENOUS BLD VENIPUNCTURE: CPT | Mod: S$GLB,,, | Performed by: FAMILY MEDICINE

## 2018-03-15 PROCEDURE — 99999 PR PBB SHADOW E&M-EST. PATIENT-LVL I: CPT | Mod: PBBFAC,,,

## 2018-03-19 ENCOUNTER — OFFICE VISIT (OUTPATIENT)
Dept: INTERNAL MEDICINE | Facility: CLINIC | Age: 73
End: 2018-03-19
Payer: MEDICARE

## 2018-03-19 VITALS
HEIGHT: 65 IN | DIASTOLIC BLOOD PRESSURE: 77 MMHG | BODY MASS INDEX: 26.17 KG/M2 | OXYGEN SATURATION: 95 % | HEART RATE: 81 BPM | WEIGHT: 157.06 LBS | TEMPERATURE: 98 F | SYSTOLIC BLOOD PRESSURE: 123 MMHG

## 2018-03-19 DIAGNOSIS — J06.9 URI, ACUTE: ICD-10-CM

## 2018-03-19 DIAGNOSIS — K21.9 GASTROESOPHAGEAL REFLUX DISEASE WITHOUT ESOPHAGITIS: ICD-10-CM

## 2018-03-19 DIAGNOSIS — J30.2 SEASONAL ALLERGIC RHINITIS, UNSPECIFIED CHRONICITY, UNSPECIFIED TRIGGER: ICD-10-CM

## 2018-03-19 DIAGNOSIS — E78.5 HYPERLIPIDEMIA ASSOCIATED WITH TYPE 2 DIABETES MELLITUS: ICD-10-CM

## 2018-03-19 DIAGNOSIS — E11.9 DIABETES MELLITUS TYPE 2 WITHOUT RETINOPATHY: ICD-10-CM

## 2018-03-19 DIAGNOSIS — R06.81 APNEIC EPISODE: ICD-10-CM

## 2018-03-19 DIAGNOSIS — R06.83 SNORING: ICD-10-CM

## 2018-03-19 DIAGNOSIS — E11.8 CONTROLLED TYPE 2 DIABETES MELLITUS WITH COMPLICATION, WITHOUT LONG-TERM CURRENT USE OF INSULIN: Primary | ICD-10-CM

## 2018-03-19 DIAGNOSIS — E78.2 MIXED HYPERLIPIDEMIA: ICD-10-CM

## 2018-03-19 DIAGNOSIS — E11.69 HYPERLIPIDEMIA ASSOCIATED WITH TYPE 2 DIABETES MELLITUS: ICD-10-CM

## 2018-03-19 PROCEDURE — 3074F SYST BP LT 130 MM HG: CPT | Mod: CPTII,S$GLB,, | Performed by: FAMILY MEDICINE

## 2018-03-19 PROCEDURE — 99499 UNLISTED E&M SERVICE: CPT | Mod: S$GLB,,, | Performed by: FAMILY MEDICINE

## 2018-03-19 PROCEDURE — 99214 OFFICE O/P EST MOD 30 MIN: CPT | Mod: S$GLB,,, | Performed by: FAMILY MEDICINE

## 2018-03-19 PROCEDURE — 99999 PR PBB SHADOW E&M-EST. PATIENT-LVL IV: CPT | Mod: PBBFAC,,, | Performed by: FAMILY MEDICINE

## 2018-03-19 PROCEDURE — 3044F HG A1C LEVEL LT 7.0%: CPT | Mod: CPTII,S$GLB,, | Performed by: FAMILY MEDICINE

## 2018-03-19 PROCEDURE — 3078F DIAST BP <80 MM HG: CPT | Mod: CPTII,S$GLB,, | Performed by: FAMILY MEDICINE

## 2018-03-19 RX ORDER — FAMOTIDINE 40 MG/1
40 TABLET, FILM COATED ORAL DAILY
Qty: 90 TABLET | Refills: 3 | Status: SHIPPED | OUTPATIENT
Start: 2018-03-19 | End: 2018-05-30 | Stop reason: SDUPTHER

## 2018-03-19 RX ORDER — PRAVASTATIN SODIUM 40 MG/1
40 TABLET ORAL DAILY
Qty: 90 TABLET | Refills: 3 | Status: SHIPPED | OUTPATIENT
Start: 2018-03-19 | End: 2018-05-30 | Stop reason: SDUPTHER

## 2018-03-19 NOTE — PROGRESS NOTES
Subjective:       Patient ID: Connie Fields is a 72 y.o. female.    Chief Complaint: Follow-up (lab results)    Here for 6 month recheck on type 2 diabetes and hyperlipidemia.  Recent labs reviewed. Lab Results       Component                Value               Date                       WBC                      9.01                03/15/2018                 HGB                      14.0                03/15/2018                 HCT                      41.9                03/15/2018                 PLT                      299                 03/15/2018                 CHOL                     168                 03/15/2018                 TRIG                     106                 03/15/2018                 HDL                      52                  03/15/2018                 LDLCALC                  94.8                03/15/2018                 ALT                      17                  03/15/2018                 AST                      19                  03/15/2018                 NA                       140                 03/15/2018                 K                        5.3 (H)             03/15/2018                 CL                       103                 03/15/2018                 CALCIUM                  10.1                03/15/2018                 CREATININE               0.7                 03/15/2018                 BUN                      13                  03/15/2018                 CO2                      31 (H)              03/15/2018                 GLU                      122 (H)             03/15/2018                 ESTGFRAFRICA             >60.0               03/15/2018                 EGFRNONAA                >60.0               03/15/2018                 HGBA1C                   5.6                 03/15/2018                 MICALBCREAT              11.5                08/10/2017            A1c remains normal range on metformin and diet.  Cholesterol on  pravastatin.  She was on 40 mg and we decreased to 20 at her last visit.  No history of hypertension and no microalbuminuria present.  She has GERD and at last visit we discussed using a daily H2 blocker, instead of when necessary PPI. She sts she is doing well with daily pepcid.  She has recent onset URI symptoms - tickle in throat, rhinorrhea, now cough. Throat no longer sore - productive cough. She had seen an urgent care last month for cough and did 10 days Doxy - had CXR negative got better, now newe illness. Also with allergies this time of year and started zyrtec with current symptoms      Diabetes   She presents for her follow-up diabetic visit. She has type 2 diabetes mellitus. Her disease course has been stable. There are no hypoglycemic associated symptoms. Pertinent negatives for hypoglycemia include no headaches. Pertinent negatives for diabetes include no foot paresthesias, no foot ulcerations and no visual change. There are no hypoglycemic complications. Symptoms are stable. Pertinent negatives for diabetic complications include no heart disease, nephropathy, peripheral neuropathy or retinopathy. Current diabetic treatment includes oral agent (monotherapy) and diet. She is compliant with treatment all of the time. She is following a diabetic and generally healthy diet. She participates in exercise three times a week ([facility] walking and some wts). (Does not check BS) An ACE inhibitor/angiotensin II receptor blocker is not being taken. She does not see a podiatrist.Eye exam is current.   URI    This is a new problem. The current episode started in the past 7 days. The problem has been gradually worsening. There has been no fever. Associated symptoms include congestion and coughing. Pertinent negatives include no ear pain, headaches, plugged ear sensation, sinus pain or sore throat. She has tried antihistamine for the symptoms. The treatment provided mild relief.     Review of Systems   HENT: Positive for  congestion. Negative for ear pain, sinus pain and sore throat.    Respiratory: Positive for cough.    Neurological: Negative for headaches.       Objective:      Physical Exam   Constitutional: She is oriented to person, place, and time. She appears well-developed and well-nourished.   HENT:   Head: Normocephalic and atraumatic.   Right Ear: Tympanic membrane, external ear and ear canal normal.   Left Ear: Tympanic membrane, external ear and ear canal normal.   Nose: Nose normal.   Mouth/Throat: Oropharynx is clear and moist. No oropharyngeal exudate.   Crowded posterior oropharynx   Eyes: Conjunctivae and EOM are normal.   Neck: Normal range of motion. Neck supple. No thyromegaly present.   Cardiovascular: Normal rate, regular rhythm and normal heart sounds.    Pulses:       Dorsalis pedis pulses are 2+ on the right side, and 2+ on the left side.        Posterior tibial pulses are 1+ on the right side, and 1+ on the left side.   Intermittent skipped/premature beats   Pulmonary/Chest: Effort normal and breath sounds normal.   Abdominal: Soft. Bowel sounds are normal. She exhibits no distension. There is no tenderness.   Musculoskeletal:        Right foot: There is normal range of motion and no deformity.        Left foot: There is normal range of motion and no deformity.   Feet:   Right Foot:   Protective Sensation: 10 sites tested. 10 sites sensed.   Skin Integrity: Negative for ulcer or skin breakdown.   Left Foot:   Protective Sensation: 10 sites tested. 10 sites sensed.   Skin Integrity: Negative for ulcer or skin breakdown.   Lymphadenopathy:     She has no cervical adenopathy.   Neurological: She is alert and oriented to person, place, and time.   Skin: Skin is warm and dry.   Psychiatric: She has a normal mood and affect. Her behavior is normal.         Assessment/Plan:     1. Controlled type 2 diabetes mellitus with complication, without long-term current use of insulin  Microalbumin/creatinine urine ratio     Hemoglobin A1c    Basic metabolic panel   2. Diabetes mellitus type 2 without retinopathy     3. Hyperlipidemia associated with type 2 diabetes mellitus  pravastatin (PRAVACHOL) 40 MG tablet    Lipid panel   4. Mixed hyperlipidemia  pravastatin (PRAVACHOL) 40 MG tablet   5. Gastroesophageal reflux disease without esophagitis  famotidine (PEPCID) 40 MG tablet   6. URI, acute     7. Seasonal allergic rhinitis, unspecified chronicity, unspecified trigger     8. Snoring  Ambulatory referral to Pulmonology   9. Apneic episode  Ambulatory referral to Pulmonology     Continue 40 mg pravastatin and daily H2 blocker - recheck 6 months.  PSE for URI - delsym ok.  She had apnea while doing EGD/colon -  sts she stops breathing. Does not ffeel rested in AM. Some daytime sleepiness.

## 2018-03-22 RX ORDER — METFORMIN HYDROCHLORIDE 500 MG/1
TABLET, EXTENDED RELEASE ORAL
Qty: 360 TABLET | Refills: 3 | Status: SHIPPED | OUTPATIENT
Start: 2018-03-22 | End: 2018-06-05 | Stop reason: SDUPTHER

## 2018-05-30 DIAGNOSIS — E78.2 MIXED HYPERLIPIDEMIA: ICD-10-CM

## 2018-05-30 DIAGNOSIS — K21.9 GASTROESOPHAGEAL REFLUX DISEASE WITHOUT ESOPHAGITIS: ICD-10-CM

## 2018-05-30 DIAGNOSIS — E78.5 HYPERLIPIDEMIA ASSOCIATED WITH TYPE 2 DIABETES MELLITUS: ICD-10-CM

## 2018-05-30 DIAGNOSIS — E11.69 HYPERLIPIDEMIA ASSOCIATED WITH TYPE 2 DIABETES MELLITUS: ICD-10-CM

## 2018-05-30 DIAGNOSIS — N39.46 MIXED STRESS AND URGE URINARY INCONTINENCE: ICD-10-CM

## 2018-05-30 RX ORDER — FAMOTIDINE 40 MG/1
40 TABLET, FILM COATED ORAL DAILY
Qty: 90 TABLET | Refills: 3 | Status: SHIPPED | OUTPATIENT
Start: 2018-05-30 | End: 2019-06-12 | Stop reason: SDUPTHER

## 2018-05-30 RX ORDER — PRAVASTATIN SODIUM 40 MG/1
40 TABLET ORAL DAILY
Qty: 90 TABLET | Refills: 3 | Status: SHIPPED | OUTPATIENT
Start: 2018-05-30 | End: 2019-06-12 | Stop reason: SDUPTHER

## 2018-05-30 RX ORDER — OXYBUTYNIN CHLORIDE 5 MG/1
5 TABLET ORAL 2 TIMES DAILY
Qty: 180 TABLET | Refills: 1 | Status: SHIPPED | OUTPATIENT
Start: 2018-05-30 | End: 2019-06-12 | Stop reason: SDUPTHER

## 2018-05-30 NOTE — TELEPHONE ENCOUNTER
Spoke with the patient [facility] pharmacy. They are requesting refills for the patient medications: Pepcid 40 mg. oxybutytnin 5 mg and pravastatin 40 mg. Patient last seen on 3/19/18.

## 2018-06-05 RX ORDER — METFORMIN HYDROCHLORIDE 500 MG/1
TABLET, EXTENDED RELEASE ORAL
Qty: 360 TABLET | Refills: 3 | Status: SHIPPED | OUTPATIENT
Start: 2018-06-05 | End: 2019-06-12 | Stop reason: SDUPTHER

## 2018-06-05 NOTE — TELEPHONE ENCOUNTER
Patient was calling in to have a refill on her metformin sent to [facility] pharmacy.  Patient last seen on 3/19/18.

## 2018-06-05 NOTE — TELEPHONE ENCOUNTER
----- Message from Feli Carbajal sent at 6/5/2018  9:45 AM CDT -----  Contact: self [phone]  States that she is having problems getting metformin. Please call back at [phone]//thank you acc

## 2018-06-06 NOTE — TELEPHONE ENCOUNTER
Message left for the patient regarding her medication being sent to her [facility] pharmacy.

## 2018-06-20 ENCOUNTER — PES CALL (OUTPATIENT)
Dept: ADMINISTRATIVE | Facility: CLINIC | Age: 73
End: 2018-06-20

## 2018-07-26 ENCOUNTER — PATIENT OUTREACH (OUTPATIENT)
Dept: ADMINISTRATIVE | Facility: HOSPITAL | Age: 73
End: 2018-07-26

## 2018-07-30 DIAGNOSIS — G47.00 INSOMNIA, UNSPECIFIED TYPE: ICD-10-CM

## 2018-07-30 RX ORDER — TRAZODONE HYDROCHLORIDE 50 MG/1
TABLET ORAL
Qty: 90 TABLET | Refills: 3 | Status: SHIPPED | OUTPATIENT
Start: 2018-07-30 | End: 2019-06-12 | Stop reason: SDUPTHER

## 2018-08-09 ENCOUNTER — OFFICE VISIT (OUTPATIENT)
Dept: INTERNAL MEDICINE | Facility: CLINIC | Age: 73
End: 2018-08-09
Payer: MEDICARE

## 2018-08-09 VITALS
HEIGHT: 65 IN | WEIGHT: 157.63 LBS | SYSTOLIC BLOOD PRESSURE: 112 MMHG | DIASTOLIC BLOOD PRESSURE: 64 MMHG | HEART RATE: 78 BPM | OXYGEN SATURATION: 96 % | BODY MASS INDEX: 26.26 KG/M2 | TEMPERATURE: 97 F

## 2018-08-09 DIAGNOSIS — S76.312A STRAIN OF LEFT HAMSTRING: Primary | ICD-10-CM

## 2018-08-09 DIAGNOSIS — K21.9 GASTROESOPHAGEAL REFLUX DISEASE WITHOUT ESOPHAGITIS: ICD-10-CM

## 2018-08-09 PROCEDURE — 99499 UNLISTED E&M SERVICE: CPT | Mod: HCNC,S$GLB,, | Performed by: FAMILY MEDICINE

## 2018-08-09 PROCEDURE — 99999 PR PBB SHADOW E&M-EST. PATIENT-LVL IV: CPT | Mod: PBBFAC,,, | Performed by: FAMILY MEDICINE

## 2018-08-09 PROCEDURE — 99213 OFFICE O/P EST LOW 20 MIN: CPT | Mod: S$GLB,,, | Performed by: FAMILY MEDICINE

## 2018-08-09 PROCEDURE — 3074F SYST BP LT 130 MM HG: CPT | Mod: CPTII,S$GLB,, | Performed by: FAMILY MEDICINE

## 2018-08-09 PROCEDURE — 3078F DIAST BP <80 MM HG: CPT | Mod: CPTII,S$GLB,, | Performed by: FAMILY MEDICINE

## 2018-08-09 NOTE — PROGRESS NOTES
Subjective:       Patient ID: Connie Fields is a 73 y.o. female.    Chief Complaint: Follow-up (acid reflux pt states taking nexium )    She is taking both famotidine and pantoprozole daily and sts GERD symptoms are controlled doing that.    Also c/o twisting reaching motion 5 weeks ago and felt a pop and pain to uppermost posterior left thigh. The pain has improved - no pain with walking, stairs. Can't sit on it however.       Review of Systems   Gastrointestinal: Negative for abdominal pain and nausea.   Musculoskeletal: Positive for myalgias. Negative for gait problem.   Psychiatric/Behavioral: Negative for sleep disturbance.       Objective:      Physical Exam   Constitutional: She is oriented to person, place, and time. She appears well-developed.   HENT:   Head: Normocephalic and atraumatic.   Cardiovascular: Normal rate, regular rhythm and normal heart sounds.    Pulmonary/Chest: Effort normal and breath sounds normal.   Musculoskeletal:   TTP to post left thigh just below buttock extending in diminishing manner down midline upper thigh.  Pain with active extension left hip joint. Mild pain with resisted flexion left knee   Neurological: She is alert and oriented to person, place, and time.   Skin: Skin is warm and dry.   Psychiatric: She has a normal mood and affect. Her behavior is normal.         Assessment/Plan:     1. Strain of left hamstring  Ambulatory Referral to Physical/Occupational Therapy   2. Gastroesophageal reflux disease without esophagitis     Consider PT for high hamstring strain vs partial tear. She is improved from initial injury - can continue to use pillow to avoid pain from sitting.  She has F/U for DM scheduled for September.

## 2018-08-15 ENCOUNTER — CLINICAL SUPPORT (OUTPATIENT)
Dept: REHABILITATION | Facility: HOSPITAL | Age: 73
End: 2018-08-15
Payer: MEDICARE

## 2018-08-15 DIAGNOSIS — S76.312A STRAIN OF LEFT HAMSTRING: Primary | ICD-10-CM

## 2018-08-15 PROCEDURE — 97161 PT EVAL LOW COMPLEX 20 MIN: CPT

## 2018-08-15 PROCEDURE — G8978 MOBILITY CURRENT STATUS: HCPCS | Mod: CJ

## 2018-08-15 PROCEDURE — 97110 THERAPEUTIC EXERCISES: CPT

## 2018-08-15 PROCEDURE — G8979 MOBILITY GOAL STATUS: HCPCS | Mod: CH

## 2018-08-15 NOTE — PLAN OF CARE
PHYSICAL THERAPY INITIAL OUTPATIENT EVALUATION    Referring Provider:  Dr. Ivana Ash    Diagnosis:       ICD-10-CM ICD-9-CM    1. Strain of left hamstring S76.312A 843.8        Orders:  Evaluate and Treat    Date of Initial Evaluation:  8-15-18    Orders :  18    Coding Cycle Visit # 1 of     SUBJECTIVE:  Patient reports 6 weeks ago that she was reachinig  and got a 'pop' in L hamstring.  Pt. Reports having pain initially with reaching down but that has decreased.  Pt. Reports discomfort with sitting and especially with long drives  Pt. Reports no bruising to hamstring.  Pt. Also reports that initial standing and walking bothered pt. But now it doesn't.        OBJECTIVE:  Pain: -7/10, located L origin to mid biceps femoris     Sensation:  B LE intact       Lumbar ROM: WNL but pain in L HS all directions            R  L  Hip ROM:  Passive flexion  122  108     Extension   Neutral  Neutral              R  L    Knee ROM:  Flexion     117  128      Extension   0  0                R  L  Strength:  Psoas    5/5  4/5     Glut Roque   1+/5  1+/5     Quadriceps   5/5  4+/5      Hamstrings   5/5  4/5 P!     Gluteus Medius  4/5  3+/5     Tensor Fascia Toña  5/5  5/5          Ankle Plantarflexion  5/5  4/5     Ankle Dorsiflexion   5/5  1+/5     Ankle Inversion  5/5  5/5     Ankle Eversion  5/5  5/5        Function: LOWER EXTREMITY FUNCTIONAL SCALE                EVAL  reeval  1. Any of your usual work, housework or school activities   3/4  2. Your usual hobbies, sporting     3/4  3. Getting in and out of tub      2/4  4. Walking between rooms      4/4  5. Putting on shoes or socks      3/4  6. Squatting        2/4  7. Lifting an object from the ground      3/4  8. Performing light activities around the home   /  9. Performing heavy activities around the home   3/4  10. Getting in and out of car      /  11. Walking 2 blocks       /  12.Walking a mile       /  13. Getting up and down 1 flight of  stairs    3/4  14. Standing for 1 hour      4/4  15. Sitting for an hour       1/4  16. Running on even ground      2/4  17. Running on uneven ground     2/4  18. Making sharp turns when running fast    2/4  19. Hopping        3/4  20. Rolling over in bed       4/4    Patient reports 75% ability based on score of the Lower Extremity Functional Scale.    Functional Limitations and Goal:   This patient's primary Physical Therapy goal is to improve his current level of function(Walking and moving around ) with minimal limitations. The patient's current level of impairment is 20-40% Impaired(CK) based on their score of 25% impaired on the Lower Extremity Functional Scale. The Patient is expected to achieve a score of 0%(CH) within 10 days of treatment    Tenderness to palpation:  tender L bicep femoris origin to mid biceps femoris    Other HS 90/90 R:L 42:32 degrees; Tito Test + bilat for quad and hip flexor tightness    ASSESSMENT:  The patient is a 73 y.o. year old female who presents to physical therapy with complaints of L Hamstring strain.  Patient's impairments include decreased B  Hip ext, B quad tightness, B HS tightness, B hip flexor tightness, mild to significant weakness in LE bilat.  These impairments are limiting patient's ability to reaching to low surfaces, sitting, tub transfers, running.  Patient's prognosis is good.  Patient will benefit from skilled physical therapy intervention to Increase B LE flexibility and strength to decrease L hamstring pain.    Co-morbidities which may impact the plan of care and potentially impede the patient's progress in therapy include: osteoarthritis.      The patient's clinical presentation is stable.  Based on patient's stable clinical presentation, multiple co-morbidities, and examination of 1 body systems, patient presents with low complexity.    Short Term Goals:  (3-4 weeks)  1.  Patient will decrease L HS pain to less than 4/10.  2.  Patient will increase B  HS 90/90 to 25 degrees.  3.  Patient will be independent with home exercise program.  4. Pt. To have full B hip ext.    Long Term Goals:  (6-8weeks)  1.  Patient will have 5/5 B LE strength to perform transfers in and out of tub with ease.  2.  Patient will decrease L HS pain to 0/10 to tolerate recreational and reaching activities.   3.  Patient will report improved function indicated by a score of 90% ability on the Lower Extremity Functional Scale.    TREATMENT PROVIDED:    Initial evaluation completed.    Manual Therapy:  N/A    Therapeutic Exercise: B quad stretch x 4 min., prone hip ext x 4 min., B HS stretch x 4 min., neutral spine x 2min., B hip abd x 2 min., B gastroc stretch x 4 min.    Self Care:  HEP, condition, activity        PLAN:  Patient will benefit from physical therapy (2) x/week for (6-8) weeks including manual therapy, therapeutic exercise, neuromuscular re-education, functional activities, modalities, and patient education.    Thank you for this referral.    These services are reasonable and necessary for the conditions set forth above while under my care.

## 2018-08-21 ENCOUNTER — CLINICAL SUPPORT (OUTPATIENT)
Dept: REHABILITATION | Facility: HOSPITAL | Age: 73
End: 2018-08-21
Payer: MEDICARE

## 2018-08-21 DIAGNOSIS — S76.312A STRAIN OF LEFT HAMSTRING: Primary | ICD-10-CM

## 2018-08-21 PROCEDURE — 97110 THERAPEUTIC EXERCISES: CPT

## 2018-08-21 PROCEDURE — 97140 MANUAL THERAPY 1/> REGIONS: CPT

## 2018-08-21 NOTE — PROGRESS NOTES
PHYSICAL THERAPY Daily Note    Referring Provider:  Dr. Ivana Ash    Diagnosis:       ICD-10-CM ICD-9-CM    1. Strain of left hamstring S76.312A 843.8        Orders:  Evaluate and Treat    Date of Initial Evaluation:  8-15-18    Orders :  18    Coding Cycle Visit # 2 of 20    SUBJECTIVE:  Pt. Reports traveling to [place] and feels that her pain was better.  Pt. Reports performing HEP consistently.       OBJECTIVE:  Pain: 4-5/10, located L origin to mid biceps femoris       ASSESSMENT:  Pt.had tenderness more L proximal semitendinosus than distally.  Pt. Educated on hamstring strengthening and hip flexor strengthening.  Pt. Still noted to get pelvic rotation with hip extension.  Pt. Noted to have pain with L hip internal rotation.  Pt. Tender with palpation L semitendinosus and L hip internal rotation.        TREATMENT PROVIDED:      Manual Therapy:  STM L semitendinosus x 5 min., L hip distraction x 2min., L hip int. Rot mobilization x 2min.    Therapeutic Exercise: B quad stretch x 4 min., prone hip ext x 4 min., B HS stretch x 4 min., neutral spine x 2min., B hip abd x 2 min., B gastroc stretch x 4 min.    Modalities: moist heat to L hamstring x 15 min.    Self Care:  HEP, condition, activity        PLAN:  Patient will benefit from physical therapy (2) x/week for (6-8) weeks including manual therapy, therapeutic exercise, neuromuscular re-education, functional activities, modalities, and patient education.    Thank you for this referral.    These services are reasonable and necessary for the conditions set forth above while under my care.

## 2018-08-23 ENCOUNTER — CLINICAL SUPPORT (OUTPATIENT)
Dept: REHABILITATION | Facility: HOSPITAL | Age: 73
End: 2018-08-23
Payer: MEDICARE

## 2018-08-23 DIAGNOSIS — S76.312A STRAIN OF LEFT HAMSTRING: Primary | ICD-10-CM

## 2018-08-23 PROCEDURE — 97014 ELECTRIC STIMULATION THERAPY: CPT

## 2018-08-23 PROCEDURE — 97110 THERAPEUTIC EXERCISES: CPT

## 2018-08-23 PROCEDURE — 97140 MANUAL THERAPY 1/> REGIONS: CPT

## 2018-08-23 NOTE — PROGRESS NOTES
PHYSICAL THERAPY Daily Note    Referring Provider:  Dr. Ivana Ash    Diagnosis:       ICD-10-CM ICD-9-CM    1. Strain of left hamstring S76.312A 843.8        Orders:  Evaluate and Treat    Date of Initial Evaluation:  8-15-18    Orders :  18    Coding Cycle Visit # 3 of     SUBJECTIVE:  Pt. Reports feeling some soreness in L hamstrings.        OBJECTIVE:  Pain: 2/10      ASSESSMENT:  Pt. Reports having discomfort with L hip flexion and abduction passively.  Pt. Reports that she has soreness in her L mid hamstrings. Pt. Had discomfort in L hamstring with prone knee flexion. Pt.'s pain level has continued to decrease in L hamstring.   Manual therapy performed to help decrease joint pain and improve mobility.  TENS used today to help decrease pain.  Pt. Decreased to 1x/wk since pain level so low.      TREATMENT PROVIDED:      Manual Therapy:  STM L semitendinosus x 5 min., L hip distraction x 2min., L hip int. Rot mobilization x 2min., L hip post mobilization x 2 min., L hip lateral mobilization x 2min., L hip ant. Mobilization x 2 min.    Therapeutic Exercise: (one on one x 8 min., supervised x 12 min.) B quad stretch x 4 min., prone hip ext x 4 min., B HS stretch x 4 min., neutral spine x 2min., B hip abd x 2 min., B gastroc stretch x 4 min.      Modalities: e-stim with moist heat to L hamstring x 15 min.    Self Care:  HEP, condition, activity        PLAN:  Patient will benefit from physical therapy (2) x/week for (6-8) weeks including manual therapy, therapeutic exercise, neuromuscular re-education, functional activities, modalities, and patient education.    Thank you for this referral.    These services are reasonable and necessary for the conditions set forth above while under my care.

## 2018-09-11 ENCOUNTER — OFFICE VISIT (OUTPATIENT)
Dept: PULMONOLOGY | Facility: CLINIC | Age: 73
End: 2018-09-11
Payer: MEDICARE

## 2018-09-11 VITALS
HEIGHT: 65 IN | BODY MASS INDEX: 26.15 KG/M2 | DIASTOLIC BLOOD PRESSURE: 68 MMHG | SYSTOLIC BLOOD PRESSURE: 116 MMHG | RESPIRATION RATE: 16 BRPM | WEIGHT: 156.94 LBS | OXYGEN SATURATION: 95 % | HEART RATE: 80 BPM

## 2018-09-11 DIAGNOSIS — G47.33 OSA (OBSTRUCTIVE SLEEP APNEA): Primary | Chronic | ICD-10-CM

## 2018-09-11 PROCEDURE — 3078F DIAST BP <80 MM HG: CPT | Mod: CPTII,,, | Performed by: INTERNAL MEDICINE

## 2018-09-11 PROCEDURE — 99999 PR PBB SHADOW E&M-EST. PATIENT-LVL III: CPT | Mod: PBBFAC,,, | Performed by: INTERNAL MEDICINE

## 2018-09-11 PROCEDURE — 1101F PT FALLS ASSESS-DOCD LE1/YR: CPT | Mod: CPTII,,, | Performed by: INTERNAL MEDICINE

## 2018-09-11 PROCEDURE — 99204 OFFICE O/P NEW MOD 45 MIN: CPT | Mod: S$PBB,,, | Performed by: INTERNAL MEDICINE

## 2018-09-11 PROCEDURE — 3074F SYST BP LT 130 MM HG: CPT | Mod: CPTII,,, | Performed by: INTERNAL MEDICINE

## 2018-09-11 PROCEDURE — 99213 OFFICE O/P EST LOW 20 MIN: CPT | Mod: PBBFAC | Performed by: INTERNAL MEDICINE

## 2018-09-11 NOTE — PATIENT INSTRUCTIONS
Obstructive Sleep Apnea  Obstructive sleep apnea is a condition that causes your air passages to become narrowed or blocked during sleep. As a result, breathing stops for short periods. Your body wakes up enough for breathing to begin again, though you don't remember it. The cycle of stopped breathing and brief awakenings can repeat dozens of times a night. This prevents the body from getting to the deeper stages of sleep that are needed for good rest and may cause your body's oxygen level to fall.  Signs of sleep apnea include loud snoring, noisy breathing, and gasping sounds during sleep. Daytime symptoms include waking up tired after a full night's sleep, waking up with headaches, feeling very sleepy or falling asleep during the day, and having problems with memory or concentration.  Risk factors for sleep apnea include:  · Being overweight  · Being a man, or a woman in menopause  · Smoking  · Using alcohol or sedating medicines  · Having enlarged structures in the nose or throat  Home care  Lifestyle changes that can help treat snoring and sleep apnea include the following:  · If you are overweight, lose weight. Talk to your healthcare provider about a weight-loss plan for you.  · Avoid alcohol for 3 to 4 hours before bedtime. Avoid sedating medications. Ask your healthcare provider about the medicines you take.  · If you smoke, talk to your healthcare provider about ways to quit.  · Sleep on your side. This can help prevent gravity from pulling relaxed throat tissues into your breathing passages.  · If you have allergies or sinus problems that block your nose, ask your healthcare provider for help.  Follow-up care  Follow up with your healthcare provider, or as advised. A diagnosis of sleep apnea is made with a sleep study. Your healthcare provider can tell you more about this test.  When to seek medical advice  Sleep apnea can make you more likely to have certain health problems. These include high blood  pressure, heart attack, stroke, and sexual dysfunction. If you have sleep apnea, talk to your healthcare provider about the best treatments for you.  Date Last Reviewed: 4/1/2017  © 5000-9444 [employer]. [address] [place] [address], [place] [address]. All rights reserved. This information is not intended as a substitute for professional medical care. Always follow your healthcare professional's instructions.

## 2018-09-11 NOTE — ASSESSMENT & PLAN NOTE
My recommendation at this point would be to set up a [facility] through [facility].  We have discussed weight loss and how this may improve her situation.  We have discussed behavioral modifications, as well.  After her study, she  could certainly try a CPAP.

## 2018-09-11 NOTE — PROGRESS NOTES
Subjective:      Patient ID: Connie Fields is a 73 y.o. female.    Patient Active Problem List   Diagnosis    Anxiety    GERD (gastroesophageal reflux disease)    Mild nonproliferative diabetic retinopathy of both eyes    Diarrhea    Mixed stress and urge urinary incontinence    Controlled type 2 diabetes mellitus with complication, without long-term current use of insulin    Nuclear sclerosis of both eyes    Cortical age-related cataract of both eyes    Diabetes mellitus type 2 without retinopathy    Hyperlipidemia associated with type 2 diabetes mellitus    CAROLYNN (obstructive sleep apnea)       Problem list has been reviewed.    Chief Complaint: Sleep Apnea        she has been referred by Ivana Ash MD for evaluation for possible obstructive sleep apnea    HPI:   Sleep Apnea:    She presents for a sleep evaluation.  Patient has observed  apnea, Loud snoring.   Her  has witnessed apnea during her sleep.   Patient denies excessive daytime sleepiness and fatigue. She reports  non restful' sleep. she denies decreased memory, completely or partially paralyzed while falling asleep or waking up    Cardiovascular risk factors: diabetes.   Bed time is 2100 Wake time is 0800;Sleep onset is within  30 Minutes;Sleep maintenance difficulties related to frequent night time awakening;  Wake after sleep onset occurs two times a night;Nocturia occurs two times a night; Uses sleep aids : Yes => Trazodone, Melatonin  Wakes up with a dry mouth : Yes.    Sleep walking: No;Sleep talking : Yes;Sleep eating:No;Vivid Dreams : Yes;Cataplexy : No,   Hypnogogic hallucinations:  No    Brother has CAROLYNN.    COMORBIDITIES:  BP Readings from Last 3 Encounters:   09/11/18 116/68   08/09/18 112/64   03/19/18 123/77       CARDIAC RISK FACTORS:  diabetes      [place] Questionnaire (validated CAROLYNN screening questionnaire)  Positive -- Snoring/apnea  Negative -- Fatigue    Body mass index is 26.12 kg/m².  (>25 is  overweight, >30 is obese)  Blood Pressure = normal blood pressure    (PreHTN 120-139/80-89, Stg1 140-159/90-99, Stg2 >160/>100)  [place] = One of three CAROLYNN categories are positive (high risk is 2-3 positive categories)     [place] Sleepiness Scale   EPWORTH SLEEPINESS SCALE 9/11/2018   Sitting and reading 1   Watching TV 1   Sitting, inactive in a public place (e.g. a theatre or a meeting) 0   As a passenger in a car for an hour without a break 2   Lying down to rest in the afternoon when circumstances permit 1   Sitting and talking to someone 0   Sitting quietly after a lunch without alcohol 1   In a car, while stopped for a few minutes in traffic 1   Total score 7       Reference: Mar CHRIS. A new method for measuring daytime sleepiness: The [place]  Sleepiness Scale. Sleep 1991; 14(6):540-5.    STOP-Bang Questionnaire (validated CAROLYNN screening questionnaire)  Negative unless checked off.  [x] Snoring    [x]  Tired/Fatigued/Sleepy  [x] Obstruction (apneas/choking)  [] Pressure (HTN)  [] BMI >35  [x] Age >50  [] Neck >40 cm  [] Gender male   STOP-Bang = 4 (low risk 0-2,high risk 3-8)    References:   STOP Questionnaire   A Tool to Screen Patients for Obstructive Sleep Apnea: EDEL Duke.C.P.C., MERLY Martinez.B.B.S., Denia Mansfield M.D.,Rasheeda Spence, Ph.D., Ema Hui M.B.B.S.,_ MERLY Malcolm.Sc.,_ Taj Walker M.D., Luis Prasad F.R.C.P.C.; Anesthesiology 2008; 108:812-21 Copyright © 2008, the American Society of Anesthesiologists, Inc. [employer]      Neck circumference 33 cm [?CAROLYNN risk if >43cm (17in) male or >41cm (15.5 in) female]    Sleep position Supine = rare    Non-supine = frequent  Mouth breathing during sleep - possibly       Occupational History:  Retired.  Denies occupational exposure to asbestos, silica and petrochemicals.    Avocational Exposures:  none    Pet Exposures:  Dog: Denies allergy to dog dander.       Previous Report Reviewed:  office notes     The following portions of the patient's history were reviewed and updated as appropriate: She  has a past medical history of Anxiety, DM2 (diabetes mellitus, type 2) (01/13), GERD (gastroesophageal reflux disease), Hiatal hernia, Mild nonproliferative diabetic retinopathy of both eyes (1/22/15), and Osteoarthritis.  She  has a past surgical history that includes Bunionectomy; TONSILLECTOMY, ADENOIDECTOMY; Cholecystectomy (05/12); Colonoscopy (08); Breast surgery (Bilateral, 1992); ESOPHAGOGASTRODUODENOSCOPY (EGD) (N/A, 12/6/2016); and COLONOSCOPY with biopsies (N/A, 12/6/2016).  Her family history includes Coronary artery disease in her brother; Diabetes in her maternal grandmother and mother; Heart failure in her brother, father, and mother; Hypertension in her brother and mother; Stroke in her mother; Suicide in her father.  She  reports that  has never smoked. she has never used smokeless tobacco. She reports that she drinks alcohol. She reports that she does not use drugs.  She has a current medication list which includes the following prescription(s): aspirin, blood sugar diagnostic, blood-glucose meter, cholecalciferol (vitamin d3), cyanocobalamin, famotidine, fish oil-omega-3 fatty acids, lactobacillus rhamnosus gg, lancets, lysine, metformin, multivitamin, oxybutynin, paroxetine, phenazopyridine hcl, pravastatin, and trazodone.  She is allergic to hydrocodone..    Review of Systems   Constitutional: Negative for fever, fatigue and night sweats.   HENT: Positive for rhinorrhea and congestion. Negative for nosebleeds and postnasal drip.    Eyes: Positive for itching.   Respiratory: Positive for apnea and snoring. Negative for cough, choking, chest tightness and wheezing.    Cardiovascular: Negative for chest pain and leg swelling.   Genitourinary: Negative for difficulty urinating.   Endocrine: Negative for cold intolerance.    Musculoskeletal: Positive for arthralgias.   Gastrointestinal:  "Positive for acid reflux. Negative for nausea, vomiting and abdominal pain.   Neurological: Negative for dizziness, light-headedness and headaches.   Psychiatric/Behavioral: The patient is not nervous/anxious.       Objective:     Vitals:    09/11/18 0955   BP: 116/68   Pulse: 80   Resp: 16   SpO2: 95%   Weight: 71.2 kg (156 lb 15.5 oz)   Height: 5' 5" (1.651 m)     Body mass index is 26.12 kg/m².    Physical Exam   Constitutional: She is oriented to person, place, and time. She appears well-developed.   HENT:   Head: Normocephalic and atraumatic.   Mallampati 2   Eyes: EOM are normal. Pupils are equal, round, and reactive to light.   Neck: Normal range of motion. Neck supple.   13"   Cardiovascular: Normal rate, regular rhythm and normal heart sounds.   No murmur heard.  Pulmonary/Chest: Effort normal and breath sounds normal.   Abdominal: Soft. Bowel sounds are normal.   Musculoskeletal: Normal range of motion. She exhibits no edema.   Neurological: She is alert and oriented to person, place, and time.   Skin: Skin is warm and dry.   Psychiatric: She has a normal mood and affect. Her behavior is normal.       Personal Diagnostic Review  none pertinent    Assessment /plan       Discussed diagnosis, its evaluation, treatment and usual course. All questions answered.    Problem List Items Addressed This Visit        Other    CAROLYNN (obstructive sleep apnea) - Primary    Current Assessment & Plan     My recommendation at this point would be to set up a sleep study through the [facility].  We have discussed weight loss and how this may improve her situation.  We have discussed behavioral modifications, as well.  After her study, she  could certainly try a CPAP.                TIME SPENT WITH PATIENT: Time spent: 60 minutes in face to face  discussion concerning diagnosis, prognosis, review of lab and test results, benefits of treatment as well as management of disease, counseling of patient and coordination of " care between various health  care providers . Greater than half the time spent was used for coordination of care and counseling of patient.     Follow-up in about 6 weeks (around 10/23/2018) for CAROLYNN.

## 2018-09-11 NOTE — LETTER
September 11, 2018      Ivana Ash MD  [address] [place] [address] [address] [place] [address]           [facility]  [address] [place] [address]  [place] [address]  Phone: [phone]  Fax: [phone]          Patient: Connie Fields   MR Number: [id number]   YOB: 1945   Date of Visit: 9/11/2018       Dear Dr. Ivana Ash:    Thank you for referring Connie Fields to me for evaluation. Attached you will find relevant portions of my assessment and plan of care.    If you have questions, please do not hesitate to call me. I look forward to following Connie Fields along with you.    Sincerely,    Dwayne Lozano MD    Enclosure  CC:  No Recipients    If you would like to receive this communication electronically, please contact [email or web address] or [phone] to request more information on [email or web address] Link access.    For providers and/or their staff who would like to refer a patient to [facility], please contact us through our one-stop-shop provider referral line, [facility], at [phone].    If you feel you have received this communication in error or would no longer like to receive these types of communications, please e-mail [email or web address]

## 2018-09-12 ENCOUNTER — CLINICAL SUPPORT (OUTPATIENT)
Dept: INTERNAL MEDICINE | Facility: CLINIC | Age: 73
End: 2018-09-12
Payer: MEDICARE

## 2018-09-12 DIAGNOSIS — E78.5 HYPERLIPIDEMIA ASSOCIATED WITH TYPE 2 DIABETES MELLITUS: ICD-10-CM

## 2018-09-12 DIAGNOSIS — E11.8 CONTROLLED TYPE 2 DIABETES MELLITUS WITH COMPLICATION, WITHOUT LONG-TERM CURRENT USE OF INSULIN: ICD-10-CM

## 2018-09-12 DIAGNOSIS — E11.69 HYPERLIPIDEMIA ASSOCIATED WITH TYPE 2 DIABETES MELLITUS: ICD-10-CM

## 2018-09-12 LAB
ALBUMIN/CREAT UR: 30.1 UG/MG
ANION GAP SERPL CALC-SCNC: 8 MMOL/L
BUN SERPL-MCNC: 14 MG/DL
CALCIUM SERPL-MCNC: 9.6 MG/DL
CHLORIDE SERPL-SCNC: 105 MMOL/L
CHOLEST SERPL-MCNC: 114 MG/DL
CHOLEST/HDLC SERPL: 2.4 {RATIO}
CO2 SERPL-SCNC: 29 MMOL/L
CREAT SERPL-MCNC: 0.7 MG/DL
CREAT UR-MCNC: 73 MG/DL
EST. GFR  (AFRICAN AMERICAN): >60 ML/MIN/1.73 M^2
EST. GFR  (NON AFRICAN AMERICAN): >60 ML/MIN/1.73 M^2
ESTIMATED AVG GLUCOSE: 114 MG/DL
GLUCOSE SERPL-MCNC: 107 MG/DL
HBA1C MFR BLD HPLC: 5.6 %
HDLC SERPL-MCNC: 47 MG/DL
HDLC SERPL: 41.2 %
LDLC SERPL CALC-MCNC: 49.6 MG/DL
MICROALBUMIN UR DL<=1MG/L-MCNC: 22 UG/ML
NONHDLC SERPL-MCNC: 67 MG/DL
POTASSIUM SERPL-SCNC: 4.8 MMOL/L
SODIUM SERPL-SCNC: 142 MMOL/L
TRIGL SERPL-MCNC: 87 MG/DL

## 2018-09-12 PROCEDURE — 36415 COLL VENOUS BLD VENIPUNCTURE: CPT | Mod: PBBFAC,PO

## 2018-09-12 PROCEDURE — 80048 BASIC METABOLIC PNL TOTAL CA: CPT

## 2018-09-12 PROCEDURE — 99211 OFF/OP EST MAY X REQ PHY/QHP: CPT | Mod: PBBFAC,PO

## 2018-09-12 PROCEDURE — 99999 PR PBB SHADOW E&M-EST. PATIENT-LVL I: CPT | Mod: PBBFAC,,,

## 2018-09-12 PROCEDURE — 83036 HEMOGLOBIN GLYCOSYLATED A1C: CPT

## 2018-09-12 PROCEDURE — 82043 UR ALBUMIN QUANTITATIVE: CPT

## 2018-09-12 PROCEDURE — 80061 LIPID PANEL: CPT

## 2018-09-19 ENCOUNTER — OFFICE VISIT (OUTPATIENT)
Dept: INTERNAL MEDICINE | Facility: CLINIC | Age: 73
End: 2018-09-19
Payer: MEDICARE

## 2018-09-19 VITALS
TEMPERATURE: 97 F | HEART RATE: 83 BPM | BODY MASS INDEX: 26.17 KG/M2 | WEIGHT: 157.06 LBS | OXYGEN SATURATION: 96 % | SYSTOLIC BLOOD PRESSURE: 112 MMHG | HEIGHT: 65 IN | DIASTOLIC BLOOD PRESSURE: 77 MMHG

## 2018-09-19 DIAGNOSIS — Z23 FLU VACCINE NEED: ICD-10-CM

## 2018-09-19 DIAGNOSIS — Z12.31 ENCOUNTER FOR SCREENING MAMMOGRAM FOR BREAST CANCER: ICD-10-CM

## 2018-09-19 DIAGNOSIS — E11.69 HYPERLIPIDEMIA ASSOCIATED WITH TYPE 2 DIABETES MELLITUS: ICD-10-CM

## 2018-09-19 DIAGNOSIS — E11.8 CONTROLLED TYPE 2 DIABETES MELLITUS WITH COMPLICATION, WITHOUT LONG-TERM CURRENT USE OF INSULIN: Primary | ICD-10-CM

## 2018-09-19 DIAGNOSIS — E78.5 HYPERLIPIDEMIA ASSOCIATED WITH TYPE 2 DIABETES MELLITUS: ICD-10-CM

## 2018-09-19 DIAGNOSIS — R20.2 PARESTHESIA OF BOTH FEET: ICD-10-CM

## 2018-09-19 PROCEDURE — 3074F SYST BP LT 130 MM HG: CPT | Mod: CPTII,,, | Performed by: FAMILY MEDICINE

## 2018-09-19 PROCEDURE — 3044F HG A1C LEVEL LT 7.0%: CPT | Mod: CPTII,,, | Performed by: FAMILY MEDICINE

## 2018-09-19 PROCEDURE — 99999 PR PBB SHADOW E&M-EST. PATIENT-LVL III: CPT | Mod: PBBFAC,,, | Performed by: FAMILY MEDICINE

## 2018-09-19 PROCEDURE — 99214 OFFICE O/P EST MOD 30 MIN: CPT | Mod: S$PBB,,, | Performed by: FAMILY MEDICINE

## 2018-09-19 PROCEDURE — 3078F DIAST BP <80 MM HG: CPT | Mod: CPTII,,, | Performed by: FAMILY MEDICINE

## 2018-09-19 PROCEDURE — 99213 OFFICE O/P EST LOW 20 MIN: CPT | Mod: PBBFAC,PO,25 | Performed by: FAMILY MEDICINE

## 2018-09-19 PROCEDURE — 90662 IIV NO PRSV INCREASED AG IM: CPT | Mod: PBBFAC,PO

## 2018-09-19 PROCEDURE — 1101F PT FALLS ASSESS-DOCD LE1/YR: CPT | Mod: CPTII,,, | Performed by: FAMILY MEDICINE

## 2018-09-19 RX ORDER — ALCOHOL 2.38 KG/3.79L
1 GEL TOPICAL DAILY
Qty: 90 CAPSULE | Refills: 3 | Status: SHIPPED | OUTPATIENT
Start: 2018-09-19 | End: 2019-03-19

## 2018-09-19 NOTE — PROGRESS NOTES
Subjective:       Patient ID: Connie Fields is a 73 y.o. female.    Chief Complaint: Follow-up (6 month)    Patient with type 2 diabetes controlled well on metformin alone.  She has recent labs with new start pravastatin 6 months ago.   Lab Results       Component                Value               Date                       WBC                      9.01                03/15/2018                 HGB                      14.0                03/15/2018                 HCT                      41.9                03/15/2018                 PLT                      299                 03/15/2018                 CHOL                     114 (L)             09/12/2018                 TRIG                     87                  09/12/2018                 HDL                      47                  09/12/2018                 LDLCALC                  49.6 (L)            09/12/2018                 ALT                      17                  03/15/2018                 AST                      19                  03/15/2018                 NA                       142                 09/12/2018                 K                        4.8                 09/12/2018                 CL                       105                 09/12/2018                 CALCIUM                  9.6                 09/12/2018                 CREATININE               0.7                 09/12/2018                 BUN                      14                  09/12/2018                 CO2                      29                  09/12/2018                 GLU                      107                 09/12/2018                 ESTGFRAFRICA             >60.0               09/12/2018                 EGFRNONAA                >60.0               09/12/2018                 HGBA1C                   5.6                 09/12/2018                 MICALBCREAT              30.1 (H)            09/12/2018            A1c within normal range and no  change.  Microalbumin now at 30.1, up from 11.x.  Patient is not on an Ace. BP not elevated.  She is on the pravastatin again - no SEs.    She had high hamstring injury and went to PT x 3 - now doing HEP and sts almost resolved - she did well with PT.  Also C/O dry mouth - is awaiting call back for sleep study. Reviewed SEs from oxybutinin.      Diabetes   She presents for her follow-up diabetic visit. She has type 2 diabetes mellitus. Her disease course has been stable. (Occas AM episodes every couple months dwayne if she has eaten less that night) There are no hypoglycemic complications. Diabetic complications include peripheral neuropathy (mild tingling at night B feet - off and on) and retinopathy. Current diabetic treatment includes oral agent (monotherapy). Her weight is stable. She is following a generally healthy diet. Meal planning includes avoidance of concentrated sweets. She participates in exercise three times a week (1-2 miles 3 x week). There is no change in her home blood glucose trend. (No checks) An ACE inhibitor/angiotensin II receptor blocker is not being taken. She does not see a podiatrist.Eye exam is current.     Review of Systems   Constitutional: Negative for fever.   Cardiovascular: Negative for leg swelling.   Gastrointestinal: Positive for diarrhea (intermittent episodes).       Objective:      Physical Exam   Constitutional: She is oriented to person, place, and time. She appears well-developed.   HENT:   Head: Normocephalic and atraumatic.   Cardiovascular: Normal rate, regular rhythm and normal heart sounds.   Pulmonary/Chest: Effort normal and breath sounds normal.   Musculoskeletal: She exhibits no edema.   Neurological: She is alert and oriented to person, place, and time.   Skin: Skin is warm and dry.   Psychiatric: She has a normal mood and affect. Her behavior is normal.         Assessment/Plan:     1. Controlled type 2 diabetes mellitus with complication, without long-term current  use of insulin  Hemoglobin A1c    Comprehensive metabolic panel    Microalbumin/creatinine urine ratio    CBC auto differential   2. Hyperlipidemia associated with type 2 diabetes mellitus     3. Paresthesia of both feet  [id number]-T3-[id number]-algal oil (METANX/FOLTANX RF) 3 mg-35 mg-2 mg -90.314 mg Cap   4. Flu vaccine need  Influenza - High Dose (65+) (PF) (IM)   5. Encounter for screening mammogram for breast cancer  Mammo Digital Screening Bilat with CAD   trial Metanx - will give her the local mail order if needed.  See if diarrhea episodes assoc wi certain foods dwayne fatty foods. OK for trial reduced dose metformin due to excellent control to see if that reduces incidence of diarrhea.  Schedule eye exam - Dr Higuera.  Consider low dose ACE - will recheck micro 6 mo  first.  mammo ordered.

## 2018-09-19 NOTE — PATIENT INSTRUCTIONS
OK to decrease metformin to one AM and two PM after recording a couple weeks of diet items preceding/associated with diarrhea episodes.

## 2018-10-04 ENCOUNTER — TELEPHONE (OUTPATIENT)
Dept: PULMONOLOGY | Facility: CLINIC | Age: 73
End: 2018-10-04

## 2018-10-04 NOTE — TELEPHONE ENCOUNTER
----- Message from Ro Mcdaniels sent at 10/4/2018  3:46 PM CDT -----  Contact: Pt  Pt called and stated she needed to speak to the nurse to see when she will be scheduled for a sleep apnea test. She can be reached at  [phone].    Thanks,  Tf

## 2018-10-16 DIAGNOSIS — N39.46 MIXED STRESS AND URGE URINARY INCONTINENCE: ICD-10-CM

## 2018-10-16 RX ORDER — OXYBUTYNIN CHLORIDE 5 MG/1
5 TABLET ORAL 2 TIMES DAILY
Qty: 180 TABLET | Refills: 1 | OUTPATIENT
Start: 2018-10-16

## 2018-10-17 ENCOUNTER — HOSPITAL ENCOUNTER (OUTPATIENT)
Dept: RADIOLOGY | Facility: HOSPITAL | Age: 73
Discharge: HOME | End: 2018-10-17
Attending: FAMILY MEDICINE
Payer: MEDICARE

## 2018-10-17 VITALS — HEIGHT: 65 IN | WEIGHT: 157 LBS | BODY MASS INDEX: 26.16 KG/M2

## 2018-10-17 DIAGNOSIS — Z12.31 ENCOUNTER FOR SCREENING MAMMOGRAM FOR BREAST CANCER: ICD-10-CM

## 2018-10-17 PROCEDURE — 77063 BREAST TOMOSYNTHESIS BI: CPT | Mod: TC

## 2018-10-17 PROCEDURE — 77067 SCR MAMMO BI INCL CAD: CPT | Mod: 26,,, | Performed by: RADIOLOGY

## 2018-10-17 PROCEDURE — 77063 BREAST TOMOSYNTHESIS BI: CPT | Mod: 26,,, | Performed by: RADIOLOGY

## 2018-10-22 ENCOUNTER — OFFICE VISIT (OUTPATIENT)
Dept: OPHTHALMOLOGY | Facility: CLINIC | Age: 73
End: 2018-10-22
Payer: MEDICARE

## 2018-10-22 DIAGNOSIS — E11.9 DIABETES MELLITUS TYPE 2 WITHOUT RETINOPATHY: Primary | ICD-10-CM

## 2018-10-22 DIAGNOSIS — H31.001 CHORIORETINAL SCAR OF RIGHT EYE: ICD-10-CM

## 2018-10-22 DIAGNOSIS — H25.013 CORTICAL AGE-RELATED CATARACT OF BOTH EYES: ICD-10-CM

## 2018-10-22 DIAGNOSIS — H25.13 NUCLEAR SCLEROSIS OF BOTH EYES: ICD-10-CM

## 2018-10-22 PROCEDURE — 99213 OFFICE O/P EST LOW 20 MIN: CPT | Mod: PBBFAC,PO | Performed by: OPTOMETRIST

## 2018-10-22 PROCEDURE — 99999 PR PBB SHADOW E&M-EST. PATIENT-LVL III: CPT | Mod: PBBFAC,,, | Performed by: OPTOMETRIST

## 2018-10-22 PROCEDURE — 92015 DETERMINE REFRACTIVE STATE: CPT | Mod: ,,, | Performed by: OPTOMETRIST

## 2018-10-22 PROCEDURE — 92014 COMPRE OPH EXAM EST PT 1/>: CPT | Mod: S$PBB,,, | Performed by: OPTOMETRIST

## 2018-10-22 NOTE — PROGRESS NOTES
HPI     NIDDM exam.  Last eye exam 10/10/2017 [facility].  New patient to [facility].  Patient notice she is having trouble driving at night.  Hazy vision at night, lights bother patient.  Patient wears OTC readers.    Diabetic Yearly Eye Exam  No changes noted with [facility]readers only  No pain or discomfort  PCP: DONI Ash    DM 2014    Last edited by Eliz Gaming on 10/22/2018 11:03 AM. (History)            Assessment /Plan     For exam results, see Encounter Report.    Diabetes mellitus type 2 without retinopathy    Cortical age-related cataract of both eyes    Nuclear sclerosis of both eyes    Chorioretinal scar of right eye      No Background Diabetic Retinopathy    Significant cataracts OU, discussed cataract surgery including Specialty IOL's    Consult Ophthalmology for cataract evaluation at next available appointment.    Unchanged CR scar, monitor

## 2018-10-30 ENCOUNTER — HOSPITAL ENCOUNTER (OUTPATIENT)
Dept: SLEEP MEDICINE | Facility: HOSPITAL | Age: 73
Discharge: HOME | End: 2018-10-30
Attending: INTERNAL MEDICINE
Payer: MEDICARE

## 2018-10-30 DIAGNOSIS — K21.9 SLEEP RELATED GASTROESOPHAGEAL REFLUX DISEASE: ICD-10-CM

## 2018-10-30 DIAGNOSIS — F51.04 PSYCHOPHYSIOLOGICAL INSOMNIA: Primary | ICD-10-CM

## 2018-10-30 DIAGNOSIS — Z72.821 INADEQUATE SLEEP HYGIENE: ICD-10-CM

## 2018-10-30 DIAGNOSIS — G47.21 DELAYED SLEEP PHASE SYNDROME: ICD-10-CM

## 2018-10-30 DIAGNOSIS — R06.83 PRIMARY SNORING: ICD-10-CM

## 2018-10-30 DIAGNOSIS — G47.33 OBSTRUCTIVE SLEEP APNEA: ICD-10-CM

## 2018-10-30 PROCEDURE — 95810 POLYSOM 6/> YRS 4/> PARAM: CPT | Mod: HCNC

## 2018-10-30 PROCEDURE — 95810 POLYSOM 6/> YRS 4/> PARAM: CPT | Mod: 26,HCNC,, | Performed by: PSYCHOLOGIST

## 2018-11-01 NOTE — PROCEDURES
Patient Name: Connie Fields  Date of Report: 18  Date of PSG:  10/30/2018   [facility] No.: [id number]   : 1945                      Time of PS:39:31 PM - 5:05:38 AM  Sex:  Female   Age:  73   Weight:  156.0 lbs Height:  5  5          Type of PSG:  Diagnostic     REASONS FOR REFERRAL: Ms Fields is a 73 year old female, referred to Dr. Dwayne Lozano, and the [facility] by Dr. Ivana Ash for evaluation of possible obstructive sleep apnea / hypopnea syndrome (OSAHS).  Dr. Lozano requested diagnostic polysomnography based on the patients reported loud snoring, observed respiratory pauses in sleep, and unrefreshing sleep.  Her [place] Sleepiness Scale score was 7, not clinically significant, and her STOP - BANG score was 4, intermediate risk of CAROLYNN.  Dr. Ash is the patients primary care physician.     STUDY PARAMETERS: This diagnostic study involved analysis of the patient's sleep pattern while breathing unassisted. The study was performed with a sleep technologist in attendance for the entire test period, with video monitoring throughout the study, and routine laboratory clinical parameters recorded:  NOTE: The polysomnography electrophysiological record for the patient has been reviewed in its entirety by Dr. Colorado.    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  1. The diagnostic polysomnography revealed no diagnosable obstructive sleep apnea / hypopnea  syndrome (A + H Index = 0.7 events / hr asleep with only 0.1 respiratory event - related arousals / hr asleep for the study, and no RERAs (respiratory effort -  related arousals).  The mean SpO2 value was 88.6 %, significant, minimum oxygen saturation during sleep was 86.0 % and waking baseline SpO2 was 95 %.  Sporadic, mild snoring was noted.  CPAP titration polysomnography is not recommended      2. If treatment of snoring (sporadic, mild during the PSG) is desired, consider a reversible treatment such as a dental oral device.  If a permanent procedure such as UPPP is preferred, periodic polysomnography may be needed because signs of worsening apnea could be missed (silent apneas) if CAROLYNN develops or becomes more severe.  3. Ms Fields is taking melatonin, a natural hormone which, in supra-physiologic doses has daytime somnolence as its primary   side effect.   4. Weight loss to the normal range is recommended as it can decrease respiratory events and snoring in overweight patients.  5. The following changes in sleep hygiene / sleep - related behavior are recommended after medical treatments are successful   Limit time for sleep to number of hours of sleep needed for adequate daytime functioning (7.5 to 9.0 hrs / night).   Regular bedtimes and wake times, including weekends: Total sleep time / night should not be more than one hour more             than usual, and bedtime or wake time should not be more than one hour earlier or later than usual.     Do not attempt to make up lost sleep by extending sleep periods.    6. If  insomnia persists after RLS has been ruled out or effectively treated, recommend  follow - up inquiry regarding frequency, duration and nature of reported sleep loss, delayed sleep onset, and involuntary early awakening (stress - related and / or conditioned psychophysiological insomnia,  r/o delayed sleep phase) and referral for behavioral and cognitive / behavioral treatment of insomnia, as  indicated.  See SDI.  7. Consider behavioral and cognitive / behavioral treatments for stress, anxiety and depression to complement the medication   and to increase the probability of long - term adaptive change.  Sleep might be expected to further improve.  8. Follow - up inquiry regarding efficacy of current treatment for sleep - related gastroesophageal reflux (please see SDI).  9. Follow - up inquiry regarding possible REM sleep behavior disorder (please see SDI).       See below for a complete interpretation of data from the polysomnography and Sleep Disorders Inventory.     Thank you for referring this patient to the [facility].      Vasyl Colorado, Ph.D., ABPP; Diplomate, American Board of Sleep Medicine

## 2018-11-07 DIAGNOSIS — F41.9 ANXIETY: ICD-10-CM

## 2018-11-07 RX ORDER — PAROXETINE 30 MG/1
TABLET, FILM COATED ORAL
Qty: 90 TABLET | Refills: 3 | Status: SHIPPED | OUTPATIENT
Start: 2018-11-07 | End: 2020-03-11 | Stop reason: SDUPTHER

## 2018-11-09 ENCOUNTER — TELEPHONE (OUTPATIENT)
Dept: INTERNAL MEDICINE | Facility: CLINIC | Age: 73
End: 2018-11-09

## 2018-11-09 ENCOUNTER — OFFICE VISIT (OUTPATIENT)
Dept: PULMONOLOGY | Facility: CLINIC | Age: 73
End: 2018-11-09
Payer: MEDICARE

## 2018-11-09 ENCOUNTER — CLINICAL SUPPORT (OUTPATIENT)
Dept: PULMONOLOGY | Facility: CLINIC | Age: 73
End: 2018-11-09
Payer: MEDICARE

## 2018-11-09 VITALS
WEIGHT: 159.5 LBS | RESPIRATION RATE: 18 BRPM | SYSTOLIC BLOOD PRESSURE: 122 MMHG | OXYGEN SATURATION: 95 % | HEIGHT: 65 IN | DIASTOLIC BLOOD PRESSURE: 60 MMHG | HEART RATE: 67 BPM | BODY MASS INDEX: 26.57 KG/M2

## 2018-11-09 DIAGNOSIS — G47.34 NOCTURNAL HYPOXEMIA: ICD-10-CM

## 2018-11-09 DIAGNOSIS — G47.34 NOCTURNAL HYPOXEMIA: Primary | ICD-10-CM

## 2018-11-09 DIAGNOSIS — N30.00 ACUTE CYSTITIS WITHOUT HEMATURIA: Primary | ICD-10-CM

## 2018-11-09 DIAGNOSIS — R06.83 PRIMARY SNORING: ICD-10-CM

## 2018-11-09 PROBLEM — G47.33 OBSTRUCTIVE SLEEP APNEA: Status: RESOLVED | Noted: 2018-09-11 | Resolved: 2018-11-09

## 2018-11-09 PROCEDURE — 94762 N-INVAS EAR/PLS OXIMTRY CONT: CPT | Mod: HCNC,S$GLB,, | Performed by: INTERNAL MEDICINE

## 2018-11-09 PROCEDURE — 3074F SYST BP LT 130 MM HG: CPT | Mod: CPTII,HCNC,S$GLB, | Performed by: INTERNAL MEDICINE

## 2018-11-09 PROCEDURE — 99499 UNLISTED E&M SERVICE: CPT | Mod: HCNC,S$GLB,, | Performed by: INTERNAL MEDICINE

## 2018-11-09 PROCEDURE — 3078F DIAST BP <80 MM HG: CPT | Mod: CPTII,HCNC,S$GLB, | Performed by: INTERNAL MEDICINE

## 2018-11-09 PROCEDURE — 99214 OFFICE O/P EST MOD 30 MIN: CPT | Mod: HCNC,S$GLB,, | Performed by: INTERNAL MEDICINE

## 2018-11-09 PROCEDURE — 99999 PR PBB SHADOW E&M-EST. PATIENT-LVL III: CPT | Mod: PBBFAC,HCNC,, | Performed by: INTERNAL MEDICINE

## 2018-11-09 PROCEDURE — 1101F PT FALLS ASSESS-DOCD LE1/YR: CPT | Mod: CPTII,HCNC,S$GLB, | Performed by: INTERNAL MEDICINE

## 2018-11-09 RX ORDER — SULFAMETHOXAZOLE AND TRIMETHOPRIM 800; 160 MG/1; MG/1
1 TABLET ORAL 2 TIMES DAILY
Qty: 10 TABLET | Refills: 0 | Status: SHIPPED | OUTPATIENT
Start: 2018-11-09 | End: 2018-11-14

## 2018-11-09 NOTE — TELEPHONE ENCOUNTER
----- Message from Marla Erwin sent at 11/9/2018 12:46 PM CST -----  Contact: Patient   Patient would like a call back at [phone], Regards to getting something called in for an bladder infection.      [id number] [place] [facility] [address] [facility]  [address]  [place] [address]  Phone: [phone] Fax: [phone]    Thanks  Td

## 2018-11-09 NOTE — TELEPHONE ENCOUNTER
Normally I would recommend getting a sample first - see her s/s - she has tried azo and symptoms return.  Okay for 5 day course Bactrim.  Patient is advised that she will continue to take plenty of fluids had-antibiotics if her symptoms are not resolved or if they return she will need a urine sample to be collected.

## 2018-11-09 NOTE — TELEPHONE ENCOUNTER
Patient called in regards to her needing something called in for a bladder infection. Patient stats that she has been feeling these symptoms for the last 2-3 days. She states that she is having urine frequency, the urge to use the restroom, but can't actually go, burning and painful urination. Patient was informed that she would probably need an appointment, pt verbally understood the information given and asks that something be called in for her to take over the weekend and she can either come in and give a urine sample or see you. Patient last seen on 9/19/18.      Please Advise

## 2018-11-09 NOTE — PATIENT INSTRUCTIONS
Lung Anatomy  Your lungs take air in to give your body oxygen, which the body needs to work. Your lungs, like all the tissues in your body, are made up of billions of tiny specialized cells. Old lung cells die and are replaced by new, identical lung cells. This natural process helps ensure healthy lungs.    Date Last Reviewed: 11/1/2016  © 9131-4352 [employer]. [address] [place] [address], [place] [address]. All rights reserved. This information is not intended as a substitute for professional medical care. Always follow your healthcare professional's instructions.

## 2018-11-09 NOTE — PROGRESS NOTES
Subjective:      Patient ID: Connie Fields is a 73 y.o. female.  Patient Active Problem List   Diagnosis    Anxiety    Mild nonproliferative diabetic retinopathy of both eyes    Diarrhea    Mixed stress and urge urinary incontinence    Controlled type 2 diabetes mellitus with complication, without long-term current use of insulin    Nuclear sclerosis of both eyes    Cortical age-related cataract of both eyes    Diabetes mellitus type 2 without retinopathy    Hyperlipidemia associated with type 2 diabetes mellitus    Primary snoring    Nocturnal hypoxemia       Problem list has been reviewed.    Chief Complaint: Sleep Apnea    HPI: she is here to review overnight PSG results. PSG of  10/30/18 reviewed with patient who voiced understanding. Sleep study revealed normal and negative for apnea. AHI was 0.6 events / hour. Oxygen  she states that she  is at her  respiratory baseline and denies any specific pulmonary complaints. she  denies cough sputum, hemoptysis,  pain with breathing, wheezing, asthma.  A full  review of systems, past , family  and social histories was performed except as mentioned in the note above, these are non contributory to the main issues discussed today.    Previous Report Reviewed: office notes and radiology reports     The following portions of the patient's history were reviewed and updated as appropriate: She  has a past medical history of Anxiety, DM (diabetes mellitus) (2013), DM2 (diabetes mellitus, type 2) (01/13), GERD (gastroesophageal reflux disease), Hiatal hernia, Mild nonproliferative diabetic retinopathy of both eyes (1/22/15), and Osteoarthritis.  She  has a past surgical history that includes Bunionectomy; TONSILLECTOMY, ADENOIDECTOMY; Cholecystectomy (05/12); Colonoscopy (08); Breast surgery (Bilateral, 1992); ESOPHAGOGASTRODUODENOSCOPY (EGD) (N/A, 12/6/2016); and COLONOSCOPY with biopsies (N/A, 12/6/2016).  Her family history includes Coronary artery disease  "in her brother; Diabetes in her maternal grandmother and mother; Heart failure in her brother, father, and mother; Hypertension in her brother and mother; Stroke in her mother; Suicide in her father.  She  reports that  has never smoked. she has never used smokeless tobacco. She reports that she drinks alcohol. She reports that she does not use drugs.  She has a current medication list which includes the following prescription(s): aspirin, blood sugar diagnostic, blood-glucose meter, cholecalciferol (vitamin d3), cyanocobalamin, famotidine, fish oil-omega-3 fatty acids, lactobacillus rhamnosus gg, lancets, jkfwapold-r4-gku11-algal oil, lysine, metformin, multivitamin, oxybutynin, paroxetine, phenazopyridine hcl, pravastatin, and trazodone.  She is allergic to hydrocodone..    Review of Systems   Constitutional: Negative for fever, fatigue and night sweats.   HENT: Positive for rhinorrhea and congestion. Negative for nosebleeds and postnasal drip.    Eyes: Positive for itching.   Respiratory: Positive for snoring. Negative for apnea, cough, choking, chest tightness and wheezing.    Cardiovascular: Negative for chest pain and leg swelling.   Genitourinary: Negative for difficulty urinating.   Endocrine: Negative for cold intolerance.    Musculoskeletal: Positive for arthralgias.   Gastrointestinal: Positive for acid reflux. Negative for nausea, vomiting and abdominal pain.   Neurological: Negative for dizziness, light-headedness and headaches.   Psychiatric/Behavioral: The patient is not nervous/anxious.         Objective:       Vitals:    11/09/18 1507   BP: 122/60   Pulse: 67   Resp: 18   SpO2: 95%   Weight: 72.3 kg (159 lb 8 oz)   Height: 5' 5" (1.651 m)     Body mass index is 26.54 kg/m².     Physical Exam   Constitutional: She is oriented to person, place, and time. She appears well-developed.   HENT:   Head: Normocephalic and atraumatic.   Mallampati 2   Eyes: EOM are normal. Pupils are equal, round, and " "reactive to light.   Neck: Normal range of motion. Neck supple.   13"   Cardiovascular: Normal rate, regular rhythm and normal heart sounds.   No murmur heard.  Pulmonary/Chest: Effort normal and breath sounds normal.   Abdominal: Soft. Bowel sounds are normal.   Musculoskeletal: Normal range of motion. She exhibits no edema.   Neurological: She is alert and oriented to person, place, and time.   Skin: Skin is warm and dry.   Psychiatric: She has a normal mood and affect. Her behavior is normal.       Personal Diagnostic Review  PSG:   The diagnostic polysomnography revealed no diagnosable obstructive sleep apnea / hypopnea syndrome (A + H Index = 0.7  events / hr asleep with only 0.1 respiratory event - related arousals / hr asleep for the study, and no RERAs (respiratory effort  - related arousals). The mean SpO2 value was 88.6 %, significant, minimum oxygen saturation during sleep was 86.0 % and  waking baseline SpO2 was 95 %. Sporadic, mild snoring was noted.    Assessment / plan :       Problem List Items Addressed This Visit        Other    Primary snoring    Current Assessment & Plan     RECOMMEND ANTI SNORE DEVICE.          Nocturnal hypoxemia - Primary    Current Assessment & Plan     OVERNIGHT OXIMETRY ON ROOM AIR.          Relevant Orders    Pulse oximetry overnight        TIME SPENT WITH PATIENT: Time spent: 30 minutes in face to face  discussion concerning diagnosis, prognosis, review of lab and test results, benefits of treatment as well as management of disease, counseling of patient and coordination of care between various health  care providers . Greater than half the time spent was used for coordination of care and counseling of patient.     Follow-up if symptoms worsen or fail to improve, for Nocturnal hypoxemia.  "

## 2018-11-12 ENCOUNTER — DOCUMENTATION ONLY (OUTPATIENT)
Dept: REHABILITATION | Facility: HOSPITAL | Age: 73
End: 2018-11-12

## 2018-11-12 NOTE — PROCEDURES
[facility]  [address] * [place] [address]  Telephone: [phone]  Test date: 18 Start: 18 19:59:08 Connie Fields  Doctor: Dr Lozano End: 18 06:32:32 [id number]  Oximetry: Summary Report  Comments: Overnight study breathing room air.  Recording time: 10:33:24 Highest pulse: 102 Highest SpO2: 96%  Excluded samplin:00:16 Lowest pulse: 60 Lowest SpO2: 85%  Total valid sampling: 10:33:08 Mean pulse: 73 Mean SpO2: 90.0%  1 S.D.: 4.8 1 S.D.: 1.0  Time with SpO2<90: 2:45:44, 26.2%  Time with SpO2<80: 0:00:00, 0.0%  Time with SpO2<70: 0:00:00, 0.0%  Time with SpO2<60: 0:00:00, 0.0%  Time with SpO2<88: 0:03:44, 0.6%  Time with SpO2 =>90: 7:47:24, 73.8%  Time with SpO2=>80 & <90: 2:45:44, 26.2%  Time with SpO2=>70 & <80: 0:00:00, 0.0%  Time with SpO2=>60 & <70: 0:00:00, 0.0%  The longest continuous time with saturation <=88 was 00:01:36, which started at  18 03:52:52.  A desaturation event was defined as a decrease of saturation by 4 or more.  No events were excluded due to artifact.  There were 12 desaturation events over 3 minutes duration.  There were 15 desaturation events of less than 3 minutes duration during which:  The mean high was 92.8%. The mean low was 87.7%.  The number of these events that were:  > 0 & <10 seconds: 0 > 0 seconds: 15  =>10 & <20 seconds: 3 =>10 seconds: 15  =>20 & <30 seconds: 0 =>20 seconds: 12  =>30 & <40 seconds: 1 =>30 seconds: 12  =>40 & <50 seconds: 1 =>40 seconds: 11  =>50 & <60 seconds: 2 =>50 seconds: 10  =>60 seconds: 8 =>60 seconds: 8  The mean length of desaturation events that were >=10 sec & <=3 mins was: 78.4 sec.  Desaturation event index (events >=10 sec per sampled hour): 1.4  Desaturation event index (events >= 0 sec per sampled hour): 1.4      PHYSICIAN INTERPRETATION:  OVERNIGHT OXIMETRY REPORT:    Dictated by: Dwayne Foster M.D.  Test date: 2018  Dictated on: 2018      Comment: This test was performed  on Room Air     A desaturation event was defined as a decrease of saturation by 4 or more.    REPORT SUMMARY  Total recording time: 10:33:24  Excluded samplin:00:16  Total valid sampling: 10:33:08  High pulse: 102 /min  Low pulse: 60 /min    Mean pulse: 73/min    High SpO2: 96%    Low SpO2: 85%    Mean SpO2  90 %  Cumulative time with oxygen saturation less than 88% (TC88): 00:03:44 ( 0.6%)      CLINICAL INTERPRETATION   There is no significant nocturnal oxygen desaturation    Medicare Criteria Comments:   Oximetry test results suggest the patient does not fall under Medicare Group 1 Criteria. ( Arterial oxygen saturation at or below 88% for at least 5 minutes taken during sleep)    Details about Medicare Group Criteria coverage can be found at http://www.cms.hhs.gov/manuals/downloads/

## 2018-11-12 NOTE — PROGRESS NOTES
Pt. Came to PT x 3 visits.  Pt. Did not return after 8-23-18. Pt. Was progressing well with PT. Pt. Now D/C from PT.

## 2018-11-14 ENCOUNTER — TELEPHONE (OUTPATIENT)
Dept: PULMONOLOGY | Facility: CLINIC | Age: 73
End: 2018-11-14

## 2018-11-14 NOTE — TELEPHONE ENCOUNTER
----- Message from Dwayne Lozano MD sent at 2018  7:32 PM CST -----  Regarding: OVERNIGH OXIMETRY RESULTS  OVERNIGHT OXIMETRY REPORT:    Dictated by: Dwayne Foster M.D.  Test date: 2018  Dictated on: 2018      Comment: This test was performed on Room Air     A desaturation event was defined as a decrease of saturation by 4 or more.    REPORT SUMMARY  Total recording time: 10:33:24  Excluded samplin:00:16  Total valid sampling: 10:33:08  High pulse: 102 /min  Low pulse: 60 /min    Mean pulse: 73/min    High SpO2: 96%    Low SpO2: 85%    Mean SpO2  90 %  Cumulative time with oxygen saturation less than 88% (TC88): 00:03:44 ( 0.6%)      CLINICAL INTERPRETATION   There is NO  significant nocturnal oxygen desaturation      Please inform patient.

## 2018-11-26 ENCOUNTER — OFFICE VISIT (OUTPATIENT)
Dept: OPHTHALMOLOGY | Facility: CLINIC | Age: 73
End: 2018-11-26
Payer: MEDICARE

## 2018-11-26 DIAGNOSIS — E11.9 DIABETES MELLITUS TYPE 2 WITHOUT RETINOPATHY: ICD-10-CM

## 2018-11-26 DIAGNOSIS — H25.013 CORTICAL AGE-RELATED CATARACT OF BOTH EYES: ICD-10-CM

## 2018-11-26 DIAGNOSIS — H25.13 NUCLEAR SCLEROSIS OF BOTH EYES: Primary | ICD-10-CM

## 2018-11-26 DIAGNOSIS — H31.001 CHORIORETINAL SCAR OF RIGHT EYE: ICD-10-CM

## 2018-11-26 DIAGNOSIS — H04.129 DRY EYE: ICD-10-CM

## 2018-11-26 PROCEDURE — 99999 PR PBB SHADOW E&M-EST. PATIENT-LVL II: CPT | Mod: PBBFAC,HCNC,, | Performed by: OPHTHALMOLOGY

## 2018-11-26 PROCEDURE — 92014 COMPRE OPH EXAM EST PT 1/>: CPT | Mod: HCNC,S$GLB,, | Performed by: OPHTHALMOLOGY

## 2018-11-26 PROCEDURE — 92136 OPHTHALMIC BIOMETRY: CPT | Mod: 26,HCNC,RT,S$GLB | Performed by: OPHTHALMOLOGY

## 2018-11-26 RX ORDER — PREDNISOLONE ACETATE 10 MG/ML
1 SUSPENSION/ DROPS OPHTHALMIC 4 TIMES DAILY
Qty: 1 BOTTLE | Refills: 2 | Status: SHIPPED | OUTPATIENT
Start: 2018-11-26 | End: 2019-02-15 | Stop reason: ALTCHOICE

## 2018-11-26 RX ORDER — KETOROLAC TROMETHAMINE 5 MG/ML
1 SOLUTION OPHTHALMIC 4 TIMES DAILY
Qty: 1 BOTTLE | Refills: 2 | Status: SHIPPED | OUTPATIENT
Start: 2018-11-26 | End: 2019-02-15 | Stop reason: ALTCHOICE

## 2018-11-26 RX ORDER — GATIFLOXACIN 5 MG/ML
1 SOLUTION/ DROPS OPHTHALMIC 2 TIMES DAILY
Qty: 1 BOTTLE | Refills: 2 | Status: SHIPPED | OUTPATIENT
Start: 2018-11-26 | End: 2019-02-15 | Stop reason: ALTCHOICE

## 2018-11-26 NOTE — PROGRESS NOTES
SUBJECTIVE:   Connie Fields is a 73 y.o. female   Corrected distance visual acuity was 20/50 in the right eye and 20/20 in the left eye.   Chief Complaint   Patient presents with    Cataract        HPI:  HPI     Pt here for cataract evaluation. No pain or discomfort. Pt states that   she can tell her vision is getting worse. She says that it's like a film   is over her eyes. She also states that she has a lot of trouble driving at   night due to the headlights and overall glare. She says she has to strain   to see at night, which causes her eyes to burn. No gtts.     PCP: DONI Ash  Referred by TRF    1. DM 2014  2. Cataracts    Last edited by Hipolito Bey, Patient Care Assistant on 11/26/2018  8:08   AM. (History)        Assessment /Plan :  1. Nuclear sclerosis of both eyes  Visually Significant Cataract OD > OS:   Patient reports decreased vision consistent with the clinical amount of lenticular opacity,  which reaches the level of visual significance and affects activities of daily living such as  drive. Risks, benefits, and alternatives to cataract surgery were  discussed.  IOL options were discussed, including Premium IOL'S and the associated  side effects and additional patient cost associated with them as well as patient's visual  goals. The pt expressed a desire to proceed with surgery with the potential for some  reasonable degree of visual improvement and was consented.  Risks of loss of vision and eye reviewed as well as possibility of need for spectacle correction after surgery even with premium implants. Verbal and written preop  instructions were provided to the patient.       Pt is interested in traditional monofocal IOL aiming for:    Distance OU and understands that glasses will be generally needed at all times for near vision and often for finer distance correction especially for higher degrees of astigmatism.       Final visual acuity may be limited by CRS OD, astigmatism and diabetes     Pt wishes to have Phaco/IOL  OD     Requests a Monofocal IOL.    Will aim for distance    Other considerations: None                 2. Cortical age-related cataract of both eyes as above   3. Diabetes mellitus type 2 without retinopathy No diabetic retinopathy at this time. Reviewed diabetic eye precautions including avoiding tobacco use,  Good glucose control, and importance of regular follow up.      4. Chorioretinal scar of right eye stable   5.      Dry Eyes recommend artificial tears prn OU

## 2018-12-12 ENCOUNTER — OUTSIDE PLACE OF SERVICE (OUTPATIENT)
Dept: ADMINISTRATIVE | Facility: OTHER | Age: 73
End: 2018-12-12
Payer: MEDICARE

## 2018-12-12 PROCEDURE — 66984 XCAPSL CTRC RMVL W/O ECP: CPT | Mod: RT,,, | Performed by: OPHTHALMOLOGY

## 2018-12-13 ENCOUNTER — OFFICE VISIT (OUTPATIENT)
Dept: OPHTHALMOLOGY | Facility: CLINIC | Age: 73
End: 2018-12-13
Payer: MEDICARE

## 2018-12-13 DIAGNOSIS — Z98.890 POST-OPERATIVE STATE: Primary | ICD-10-CM

## 2018-12-13 PROCEDURE — 99024 POSTOP FOLLOW-UP VISIT: CPT | Mod: HCNC,S$GLB,, | Performed by: OPHTHALMOLOGY

## 2018-12-13 PROCEDURE — 99999 PR PBB SHADOW E&M-EST. PATIENT-LVL II: CPT | Mod: PBBFAC,HCNC,, | Performed by: OPHTHALMOLOGY

## 2018-12-13 NOTE — PROGRESS NOTES
SUBJECTIVE:   Connie Fields is a 73 y.o. female   Uncorrected distance visual acuity was 20/60 +2 in the right eye and not recorded in the left eye.   Chief Complaint   Patient presents with    Post-op Evaluation        HPI:  HPI     Patient states 0/10 on pain scale.        PCP: DONI Ash  Referred by TRF    1. DM 2014  2.PCIOL OD 12/12/18  CAT OS  3. Foveal CRS OD      OD Pred Qid, Ket Qid, Gat Bid    Last edited by ANN Rodriguez on 12/13/2018  7:50 AM. (History)        Assessment /Plan :  1. Post-operative state Exam:  SLE:  S/C: normal  K : trace k fold  AC: trace cell and flare  Iris: normal  Lens: PCIOL    IMP:  PO Day 1 S/P Phaco/IOL OD : Doing well.    Plan:  Continue gtts to operative eye:  Pred 1% QID  Ketorolac QID  Zymaxid BID  Reinstructed in importance of absolute compliance with Post-OP instructions including medications, shield at bedtime, and limitation of activities. Follow up appointments in approximately one and six weeks or call immediately for increased pain, redness or vision loss.

## 2018-12-18 ENCOUNTER — OFFICE VISIT (OUTPATIENT)
Dept: OPHTHALMOLOGY | Facility: CLINIC | Age: 73
End: 2018-12-18
Payer: MEDICARE

## 2018-12-18 DIAGNOSIS — H25.12 NUCLEAR SCLEROSIS OF LEFT EYE: ICD-10-CM

## 2018-12-18 DIAGNOSIS — H26.9 CORTICAL CATARACT OF LEFT EYE: ICD-10-CM

## 2018-12-18 DIAGNOSIS — Z98.890 POST-OPERATIVE STATE: Primary | ICD-10-CM

## 2018-12-18 PROCEDURE — 99999 PR PBB SHADOW E&M-EST. PATIENT-LVL II: CPT | Mod: PBBFAC,HCNC,, | Performed by: OPHTHALMOLOGY

## 2018-12-18 PROCEDURE — 92136 OPHTHALMIC BIOMETRY: CPT | Mod: 26,HCNC,LT,S$GLB | Performed by: OPHTHALMOLOGY

## 2018-12-18 PROCEDURE — 99024 POSTOP FOLLOW-UP VISIT: CPT | Mod: HCNC,S$GLB,, | Performed by: OPHTHALMOLOGY

## 2018-12-18 RX ORDER — KETOROLAC TROMETHAMINE 5 MG/ML
1 SOLUTION OPHTHALMIC 4 TIMES DAILY
Qty: 1 BOTTLE | Refills: 2 | Status: SHIPPED | OUTPATIENT
Start: 2018-12-18 | End: 2019-02-15 | Stop reason: ALTCHOICE

## 2018-12-18 RX ORDER — PREDNISOLONE ACETATE 10 MG/ML
1 SUSPENSION/ DROPS OPHTHALMIC 4 TIMES DAILY
Qty: 1 BOTTLE | Refills: 2 | Status: SHIPPED | OUTPATIENT
Start: 2018-12-18 | End: 2019-02-15 | Stop reason: ALTCHOICE

## 2018-12-18 RX ORDER — GATIFLOXACIN 5 MG/ML
1 SOLUTION/ DROPS OPHTHALMIC 2 TIMES DAILY
Qty: 1 BOTTLE | Refills: 2 | Status: SHIPPED | OUTPATIENT
Start: 2018-12-18 | End: 2019-02-15 | Stop reason: ALTCHOICE

## 2018-12-18 NOTE — PROGRESS NOTES
SUBJECTIVE:   Connie Fields is a 73 y.o. female   Uncorrected distance visual acuity was 20/40 -2 in the right eye and not recorded in the left eye.   Chief Complaint   Patient presents with    Post-op Evaluation     1 week phaco OD per pt doing much better since last visit         HPI:  HPI     Post-op Evaluation      Additional comments: 1 week phaco OD per pt doing much better since last   visit               Comments     PCP: DONI Ash  Referred by TRF    1. DM 2014  2.PCIOL OD +21.5 SN60WF (distance) 12/12/18  CAT OS  3. Foveal CRS OD      OD Pred Qid, Ket Qid, Gat Bid          Last edited by Celina Chino MA on 12/18/2018 10:30 AM. (History)        Assessment /Plan :  1. Post-operative state Slit lamp exam:  L/L: nl  K: clear, wound sealed  AC: 0 cell and flare  Iris/Lens: IOL centered and stable      IMP:  PO Week 1 S/P Phaco/ IOL OD : Doing well with no evidence of infection or abnormal inflammation.     Plan:  Pt given and instructed in one week PO instructions. D/C zymaxid and start to taper off Ketorlac and Pred Forte 1% weekly. Can resume normal activitites and d/c eye shield. OTC reading glasses can be used until evaluated for final MR. Follow up in one month or PRN pain, redness, vision loss, or other concerns.     2. Nuclear sclerosis of left eye   Patient reports decreased vision in the fellow eye consistent with the clinical amount of lenticular opacity, which reaches the level of visual significance and affects activities of daily living including reading and glare. Risks, benefits, and alternatives to cataract surgery were discussed and pt desired to schedule cataract surgery. Pt was consented and the biometry and lens options were reviewed.    Schedule cataract surgery in OS       Pt is interested in traditional monofocal IOL aiming for:    Distance OU and understands that glasses will be generally needed at all times for near vision and often for finer distance correction  especially for higher degrees of astigmatism.       Final visual acuity may be limited by astigmatism and diabetes    Pt wishes to have Phaco/IOL  OS     Requests a Monofocal IOL.    Will aim for distance    Other considerations: None       3. Cortical cataract of left eye as above

## 2018-12-19 ENCOUNTER — TELEPHONE (OUTPATIENT)
Dept: OPHTHALMOLOGY | Facility: CLINIC | Age: 73
End: 2018-12-19

## 2018-12-19 NOTE — TELEPHONE ENCOUNTER
Spoke to patient and scheduled phaco sx on 1/9/19.  Post op appointment was also scheduled for 1/10/19 @ 0745 at [facility].

## 2018-12-19 NOTE — TELEPHONE ENCOUNTER
----- Message from Cassandra Gaming sent at 12/19/2018 10:18 AM CST -----  Contact: pt  [phone] returning you call to schedule surgery thanks

## 2019-01-09 ENCOUNTER — OUTSIDE PLACE OF SERVICE (OUTPATIENT)
Dept: ADMINISTRATIVE | Facility: OTHER | Age: 74
End: 2019-01-09
Payer: MEDICARE

## 2019-01-09 PROCEDURE — 66984 PR REMOVAL, CATARACT, W/INSRT INTRAOC LENS, W/O ENDO CYCLO: ICD-10-PCS | Mod: 79,LT,, | Performed by: OPHTHALMOLOGY

## 2019-01-09 PROCEDURE — 66984 XCAPSL CTRC RMVL W/O ECP: CPT | Mod: 79,LT,, | Performed by: OPHTHALMOLOGY

## 2019-01-10 ENCOUNTER — OFFICE VISIT (OUTPATIENT)
Dept: OPHTHALMOLOGY | Facility: CLINIC | Age: 74
End: 2019-01-10
Payer: MEDICARE

## 2019-01-10 DIAGNOSIS — Z98.890 POST-OPERATIVE STATE: Primary | ICD-10-CM

## 2019-01-10 PROCEDURE — 99024 PR POST-OP FOLLOW-UP VISIT: ICD-10-PCS | Mod: HCNC,S$GLB,, | Performed by: OPHTHALMOLOGY

## 2019-01-10 PROCEDURE — 99999 PR PBB SHADOW E&M-EST. PATIENT-LVL II: CPT | Mod: PBBFAC,HCNC,, | Performed by: OPHTHALMOLOGY

## 2019-01-10 PROCEDURE — 99999 PR PBB SHADOW E&M-EST. PATIENT-LVL II: ICD-10-PCS | Mod: PBBFAC,HCNC,, | Performed by: OPHTHALMOLOGY

## 2019-01-10 PROCEDURE — 99024 POSTOP FOLLOW-UP VISIT: CPT | Mod: HCNC,S$GLB,, | Performed by: OPHTHALMOLOGY

## 2019-01-10 NOTE — PROGRESS NOTES
SUBJECTIVE:   Connie Fields is a 73 y.o. female   Uncorrected distance visual acuity was not recorded in the right eye and 20/40 in the left eye.   Chief Complaint   Patient presents with    Post-op Evaluation     1 day phaco OS doing well no complaints         HPI:  HPI     Post-op Evaluation      Additional comments: 1 day phaco OS doing well no complaints           Last edited by Celina Chino MA on 1/10/2019  7:35 AM. (History)        Assessment /Plan :  1. Post-operative state Exam:  SLE:  S/C: normal  K : trace k fold  AC: trace cell and flare  Iris: normal  Lens: PCIOL    IMP:  PO Day 1 S/P Phaco/IOL OS : Doing well.    Plan:  Continue gtts to operative eye:  Pred 1% QID  Ketorolac QID  Zymaxid BID  Reinstructed in importance of absolute compliance with Post-OP instructions including medications, shield at bedtime, and limitation of activities. Follow up appointments in approximately one and six weeks or call immediately for increased pain, redness or vision loss.

## 2019-01-15 ENCOUNTER — OFFICE VISIT (OUTPATIENT)
Dept: OPHTHALMOLOGY | Facility: CLINIC | Age: 74
End: 2019-01-15
Payer: MEDICARE

## 2019-01-15 DIAGNOSIS — Z98.890 POST-OPERATIVE STATE: Primary | ICD-10-CM

## 2019-01-15 PROCEDURE — 99999 PR PBB SHADOW E&M-EST. PATIENT-LVL II: CPT | Mod: PBBFAC,HCNC,, | Performed by: OPHTHALMOLOGY

## 2019-01-15 PROCEDURE — 99999 PR PBB SHADOW E&M-EST. PATIENT-LVL II: ICD-10-PCS | Mod: PBBFAC,HCNC,, | Performed by: OPHTHALMOLOGY

## 2019-01-15 PROCEDURE — 99024 POSTOP FOLLOW-UP VISIT: CPT | Mod: HCNC,S$GLB,, | Performed by: OPHTHALMOLOGY

## 2019-01-15 PROCEDURE — 99024 PR POST-OP FOLLOW-UP VISIT: ICD-10-PCS | Mod: HCNC,S$GLB,, | Performed by: OPHTHALMOLOGY

## 2019-01-15 NOTE — PROGRESS NOTES
SUBJECTIVE:   Connie Fields is a 73 y.o. female   Uncorrected distance visual acuity was not recorded in the right eye and 20/30 in the left eye.   No chief complaint on file.       HPI:      Assessment /Plan :  1. Post-operative state   Slit lamp exam:  L/L: nl  K: clear, wound sealed  AC: 0 cell and flare  Iris/Lens: IOL centered and stable      IMP:  PO Week 1 S/P Phaco/ IOL OS : Doing well with no evidence of infection or abnormal inflammation.     Plan:  Pt given and instructed in one week PO instructions. D/C zymaxid and start to taper off Ketorlac and Pred Forte 1% weekly. Can resume normal activitites and d/c eye shield. OTC reading glasses can be used until evaluated for final MR. Follow up in one month or PRN pain, redness, vision loss, or other concerns.

## 2019-02-15 ENCOUNTER — OFFICE VISIT (OUTPATIENT)
Dept: OPHTHALMOLOGY | Facility: CLINIC | Age: 74
End: 2019-02-15
Payer: MEDICARE

## 2019-02-15 DIAGNOSIS — Z98.890 POST-OPERATIVE STATE: Primary | ICD-10-CM

## 2019-02-15 PROCEDURE — 92015 PR REFRACTION: ICD-10-PCS | Mod: HCNC,S$GLB,, | Performed by: OPTOMETRIST

## 2019-02-15 PROCEDURE — 99024 PR POST-OP FOLLOW-UP VISIT: ICD-10-PCS | Mod: HCNC,S$GLB,, | Performed by: OPTOMETRIST

## 2019-02-15 PROCEDURE — 99999 PR PBB SHADOW E&M-EST. PATIENT-LVL I: ICD-10-PCS | Mod: PBBFAC,HCNC,, | Performed by: OPTOMETRIST

## 2019-02-15 PROCEDURE — 99024 POSTOP FOLLOW-UP VISIT: CPT | Mod: HCNC,S$GLB,, | Performed by: OPTOMETRIST

## 2019-02-15 PROCEDURE — 92015 DETERMINE REFRACTIVE STATE: CPT | Mod: HCNC,S$GLB,, | Performed by: OPTOMETRIST

## 2019-02-15 PROCEDURE — 99999 PR PBB SHADOW E&M-EST. PATIENT-LVL I: CPT | Mod: PBBFAC,HCNC,, | Performed by: OPTOMETRIST

## 2019-02-15 NOTE — PROGRESS NOTES
HPI     1 month S/P cataract SX.  Patient states vision at distance was better before SX.  Film over vision when patient first wake up for about 30 minutes then   clear up.  Last eye visit 01/15/2019 [facility]    Last edited by Eliz Gaming on 2/15/2019  2:08 PM. (History)            Assessment /Plan     For exam results, see Encounter Report.    Post-operative state      Dry eyes causing blurred vision.    RTC 5 months recheck dry eye and refraction DFE

## 2019-03-01 ENCOUNTER — OFFICE VISIT (OUTPATIENT)
Dept: INTERNAL MEDICINE | Facility: CLINIC | Age: 74
End: 2019-03-01
Payer: MEDICARE

## 2019-03-01 VITALS
HEIGHT: 65 IN | TEMPERATURE: 99 F | SYSTOLIC BLOOD PRESSURE: 118 MMHG | WEIGHT: 157.06 LBS | BODY MASS INDEX: 26.17 KG/M2 | DIASTOLIC BLOOD PRESSURE: 71 MMHG | HEART RATE: 82 BPM | OXYGEN SATURATION: 95 %

## 2019-03-01 DIAGNOSIS — F41.9 ANXIETY: Primary | ICD-10-CM

## 2019-03-01 DIAGNOSIS — F43.0 STRESS REACTION: ICD-10-CM

## 2019-03-01 PROCEDURE — 1101F PR PT FALLS ASSESS DOC 0-1 FALLS W/OUT INJ PAST YR: ICD-10-PCS | Mod: HCNC,CPTII,S$GLB, | Performed by: FAMILY MEDICINE

## 2019-03-01 PROCEDURE — 3074F PR MOST RECENT SYSTOLIC BLOOD PRESSURE < 130 MM HG: ICD-10-PCS | Mod: HCNC,CPTII,S$GLB, | Performed by: FAMILY MEDICINE

## 2019-03-01 PROCEDURE — 3078F PR MOST RECENT DIASTOLIC BLOOD PRESSURE < 80 MM HG: ICD-10-PCS | Mod: HCNC,CPTII,S$GLB, | Performed by: FAMILY MEDICINE

## 2019-03-01 PROCEDURE — 3074F SYST BP LT 130 MM HG: CPT | Mod: HCNC,CPTII,S$GLB, | Performed by: FAMILY MEDICINE

## 2019-03-01 PROCEDURE — 99213 PR OFFICE/OUTPT VISIT, EST, LEVL III, 20-29 MIN: ICD-10-PCS | Mod: HCNC,S$GLB,, | Performed by: FAMILY MEDICINE

## 2019-03-01 PROCEDURE — 99999 PR PBB SHADOW E&M-EST. PATIENT-LVL IV: ICD-10-PCS | Mod: PBBFAC,HCNC,, | Performed by: FAMILY MEDICINE

## 2019-03-01 PROCEDURE — 99213 OFFICE O/P EST LOW 20 MIN: CPT | Mod: HCNC,S$GLB,, | Performed by: FAMILY MEDICINE

## 2019-03-01 PROCEDURE — 3078F DIAST BP <80 MM HG: CPT | Mod: HCNC,CPTII,S$GLB, | Performed by: FAMILY MEDICINE

## 2019-03-01 PROCEDURE — 99999 PR PBB SHADOW E&M-EST. PATIENT-LVL IV: CPT | Mod: PBBFAC,HCNC,, | Performed by: FAMILY MEDICINE

## 2019-03-01 PROCEDURE — 1101F PT FALLS ASSESS-DOCD LE1/YR: CPT | Mod: HCNC,CPTII,S$GLB, | Performed by: FAMILY MEDICINE

## 2019-03-01 NOTE — PATIENT INSTRUCTIONS
Counseling for Depression  For some people, counseling, also called talk therapy, has been found to be as effective as medicine for mild to moderate depression. When done by a trained professional, this treatment is a powerful way to better understand your thoughts and feelings. Like medicine, it may take time before you notice how much counseling is helping.    Kinds of talk therapy  Different counselors use different methods for talk therapy. But all therapy aims to help change how you think about your problem. Most therapy for depression is often done one-on-one. But it may also be done in a group setting. You and your healthcare provider can discuss the type of therapy you think would work best for you. You can also discuss who the best person is to provide the therapy.  How therapy helps  Talking about your problems can help them seem less overwhelming. It can help work through problems you have with your life and your relationships. It can also help you understand how depression is clouding your thinking, not letting you see the world the way it really is. Therapy can give you:  · Insight about your emotions  · New tools for dealing with your problems  · Emotional support for making progress  Getting better takes time  Talk therapy can help you feel better. But change doesnt happen right away. Depression takes away your energy and motivation. So it can be hard to feel like going to therapy and sticking with it. But therapy has been proven to be very valuable in the treatment of depression. Therapy for depression is often done for a set number of sessions. In other cases, you and your therapist decide together at what point you no longer need therapy.  Additional sources of help  In addition to a professional counselor, it may help to talk to other people in your life. You may find support and insight from:  · A close friend or family member  · A  trained in counseling  · A local support group  or community group  · A 12-step program (such as Alcoholics Anonymous) for dealing with problems that can contribute to depression, such as alcohol or drug addiction  Date Last Reviewed: 1/1/2017 © 2000-2017 [employer]. [address] [place] [address], [place] [address]. All rights reserved. This information is not intended as a substitute for professional medical care. Always follow your healthcare professional's instructions.        Depression: Tips to Help Yourself  As your healthcare providers help treat your depression, you can also help yourself. Keep in mind that your illness affects you emotionally, physically, mentally, and socially. So full recovery will take time. Take care of your body and your soul, and be patient with yourself as you get better.    Self-care  · Educate yourself. Read about treatment and medicine options. If you have the energy, attend local conferences or support groups. Keep a list of useful websites and helpful books and use them as needed. This illness is not your fault. Dont blame yourself for your depression.  · Manage early symptoms. If you notice symptoms returning, experience triggers, or identify other factors that may lead to a depressive episode, get help as soon as possible. Ask trusted friends and family to monitor your behavior and let you know if they see anything of concern.  · Work with your provider. Find a provider you can trust. Communicate honestly with that person and share information on your treatment for depression and your reaction to medicines.  · Be prepared for a crisis. Know what to do if you experience a crisis. Keep the phone number of a crisis hotline and know the location of your community's urgent care centers and the closest emergency department.  · Hold off on big decisions. Depression can cloud your judgment. So wait until you feel better before making major life decisions, such as changing jobs, moving, or getting  or  .  · Be patient. Recovering from depression is a process. Dont be discouraged if it takes some time to feel better.  · Keep it simple. Depression saps your energy and concentration. So you wont be able to do all the things you used to do. Set small goals and do what you can.  · Be with others. Dont isolate yourself--youll only feel worse. Try to be with other people. And take part in fun activities when you can. Go to a movie, [employer], [employer] service, or social event. Talk openly with people you can trust. And accept help when its offered.  Take care of your body  People with depression often lose the desire to take care of themselves. That only makes their problems worse. During treatment and afterward, make a point to:  · Exercise. Its a great way to take care of your body. And studies have shown that exercise helps fight depression.  · Avoid drugs and alcohol. These may ease the pain in the short term. But theyll only make your problems worse in the long run.  · Get relief from stress. Ask your healthcare provider for relaxation exercises and techniques to help relieve stress.  · Eat right. A balanced and healthy diet helps keep your body healthy.  Date Last Reviewed: 1/1/2017  © 0497-4069 [employer]. [address] [place] [address], [place] [address]. All rights reserved. This information is not intended as a substitute for professional medical care. Always follow your healthcare professional's instructions.

## 2019-03-01 NOTE — ASSESSMENT & PLAN NOTE
Discussed different options, including but not limited to, increased dosage, combo therapy, alternative therapy. Recommended counseling. Pt voiced understanding. Provided techniques and pt taught back rule of 3s, guided imagery, and deep breathing. Pt to f/u w/ pcp in early March if sx do not improve. Mood d/o neg, PHQ9=16 w/ feeling bad about self, failure; GAD7=18 w/ worry and feeling afraid

## 2019-03-01 NOTE — PROGRESS NOTES
"Subjective:       Patient ID: Connie Fields is a 73 y.o. female.    Chief Complaint: Depression and Anxiety    HPI  Here for above sx  Taking paxil 30mg long term  Has taken klonopin in past but discontinued    Had appt w/ PCP yesterday but did not go d/t stressors (son)  Significant stressors in life for last 5 months  Review of Systems   Constitutional: Negative for fever.   HENT: Negative for congestion.    Respiratory: Negative for cough.    Cardiovascular: Negative for chest pain.   Genitourinary: Negative for dysuria and frequency.   Psychiatric/Behavioral: Positive for decreased concentration and dysphoric mood. Negative for self-injury, sleep disturbance and suicidal ideas. The patient is nervous/anxious.         Objective:   /71 (BP Location: Left arm, Patient Position: Sitting, BP Method: Medium (Automatic))   Pulse 82   Temp 98.8 °F (37.1 °C) (Tympanic)   Ht 5' 5" (1.651 m)   Wt 71.2 kg (157 lb 1.2 oz)   SpO2 95%   BMI 26.14 kg/m²     Physical Exam   Constitutional: She is oriented to person, place, and time. She appears well-nourished. No distress.   HENT:   Head: Normocephalic and atraumatic.   Mouth/Throat: Oropharynx is clear and moist.   Eyes: Conjunctivae and EOM are normal. No scleral icterus.   Neck: Normal range of motion. Neck supple.   Cardiovascular: Normal rate, regular rhythm and normal heart sounds.   Pulmonary/Chest: Effort normal and breath sounds normal. She has no wheezes.   Abdominal: Soft. Bowel sounds are normal. There is no tenderness.   Musculoskeletal: She exhibits no edema or deformity.   Neurological: She is alert and oriented to person, place, and time.   Skin: Skin is warm and dry.   Psychiatric: Her behavior is normal. Her mood appears not anxious. She exhibits a depressed mood.   Vitals reviewed.    Assessment:     1. Anxiety    2. Stress reaction      Plan:     Problem List Items Addressed This Visit        Psychiatric    Anxiety - Primary    Current " Assessment & Plan     Discussed different options, including but not limited to, increased dosage, combo therapy, alternative therapy. Recommended counseling. Pt voiced understanding. Provided techniques and pt taught back rule of 3s, guided imagery, and deep breathing. Pt to f/u w/ pcp in early March if sx do not improve. Mood d/o neg, PHQ9=16 w/ feeling bad about self, failure; GAD7=18 w/ worry and feeling afraid         Relevant Orders    Ambulatory referral to Psychology      Other Visit Diagnoses     Stress reaction              Follow-up if symptoms worsen or fail to improve.

## 2019-03-04 DIAGNOSIS — E11.69 HYPERLIPIDEMIA ASSOCIATED WITH TYPE 2 DIABETES MELLITUS: ICD-10-CM

## 2019-03-04 DIAGNOSIS — K21.9 GASTROESOPHAGEAL REFLUX DISEASE WITHOUT ESOPHAGITIS: ICD-10-CM

## 2019-03-04 DIAGNOSIS — E78.2 MIXED HYPERLIPIDEMIA: ICD-10-CM

## 2019-03-04 DIAGNOSIS — E78.5 HYPERLIPIDEMIA ASSOCIATED WITH TYPE 2 DIABETES MELLITUS: ICD-10-CM

## 2019-03-05 ENCOUNTER — PATIENT OUTREACH (OUTPATIENT)
Dept: ADMINISTRATIVE | Facility: HOSPITAL | Age: 74
End: 2019-03-05

## 2019-03-05 RX ORDER — FAMOTIDINE 40 MG/1
40 TABLET, FILM COATED ORAL DAILY
Qty: 90 TABLET | Refills: 3 | OUTPATIENT
Start: 2019-03-05 | End: 2020-03-04

## 2019-03-05 RX ORDER — METFORMIN HYDROCHLORIDE 500 MG/1
TABLET, EXTENDED RELEASE ORAL
Qty: 360 TABLET | Refills: 3 | OUTPATIENT
Start: 2019-03-05

## 2019-03-05 RX ORDER — PRAVASTATIN SODIUM 40 MG/1
40 TABLET ORAL DAILY
Qty: 90 TABLET | Refills: 3 | OUTPATIENT
Start: 2019-03-05

## 2019-03-12 ENCOUNTER — INITIAL CONSULT (OUTPATIENT)
Dept: PSYCHIATRY | Facility: CLINIC | Age: 74
End: 2019-03-12
Payer: MEDICARE

## 2019-03-12 DIAGNOSIS — F41.1 GENERALIZED ANXIETY DISORDER: ICD-10-CM

## 2019-03-12 DIAGNOSIS — F33.1 MAJOR DEPRESSIVE DISORDER, RECURRENT EPISODE, MODERATE: Primary | ICD-10-CM

## 2019-03-12 PROCEDURE — 99499 RISK ADDL DX/OHS AUDIT: ICD-10-PCS | Mod: HCNC,S$GLB,, | Performed by: SOCIAL WORKER

## 2019-03-12 PROCEDURE — 90791 PR PSYCHIATRIC DIAGNOSTIC EVALUATION: ICD-10-PCS | Mod: HCNC,S$GLB,, | Performed by: SOCIAL WORKER

## 2019-03-12 PROCEDURE — 90791 PSYCH DIAGNOSTIC EVALUATION: CPT | Mod: HCNC,S$GLB,, | Performed by: SOCIAL WORKER

## 2019-03-12 PROCEDURE — 99499 UNLISTED E&M SERVICE: CPT | Mod: HCNC,S$GLB,, | Performed by: SOCIAL WORKER

## 2019-03-12 NOTE — PROGRESS NOTES
"Psychiatry Initial Visit (PhD/LCSW)  Diagnostic Interview - CPT 83309    Date: 3/12/2019    Site: [facility]    Referral source: Dr. Miya Dickinson    Clinical status of patient: Outpatient    Connie Fields, a 73 y.o. female, for initial evaluation visit.  Met with patient.    Chief complaint/reason for encounter: "Anxiety and depression; problems with my  and my son; always caught in between them."    History of present illness: "I feel stressed. Things that used to make me happy-don't. My  says I'm making excuses for my son." Pt's youngest son (age 38) lives with pt and her 3rd hsb. Son has been to rehab several times for AUD and at age 36 moved in with pt and hsb. Son relapsed and pt "didn't want him to be thrown on the streets." Hsb has given pt an ultimatum that if son doesn't leave their home, then hsb will leave. Son recently got a job at [employer] after being unemployed for five years. Hsb "overspends" and pt keeps her finances separate from his. Pt is supporting son financially with her pension. Pt talks to son everyday and states she can tell he isn't drinking at this time, but she recognizes that without treatment, he will drink again.         Symptoms:   · Mood: depressed mood, diminished interest, weight loss, fatigue, worthlessness/guilt, poor concentration, tearfulness and social isolation  · Anxiety: excessive anxiety/worry, restlessness/keyed up, muscle tension and post-traumatic stress  · Substance abuse: denied  · Cognitive functioning: denied  · Health behaviors: noncontributory    Psychiatric history: psychotropic management by PCP and prior suicide attempt(s)    Medical history: see medical record    Family history of psychiatric illness: Father  by suicide    Social history (marriage, employment, etc.): Pt's father shot himself when pt was 7 y/o. Brother  of [place] a few months ago. Pt reports she attempted suicide about ten years ago. Pt gave up custody of her " children at age 33 and three oldest sons lived with their father (first , who told the children that pt didn't want them). Pt's third son went to rehab five months later but pt was not told this. Pt reports that her three older sons think she didn't want them, particularly after she remarried and youngest lived with pt and third hsb. 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 Pt's ex-hsb (second marriage and youngest son's father) was alcoholic, had anger problems and was verbally abusive to pt's son. Son began having grand mal seizures at age 18 months, reportedly triggered by fevers. Son began to abuse alcohol around age 14 or 15; pt  son's father when son was 16 and father stopped seeing or communicating with son. Pt  third hsb (current), who has never had children.    Substance use:   Alcohol: none   Drugs: none   Tobacco: none   Caffeine: none    Current medications and drug reactions (include OTC, herbal): see medication list     Strengths and liabilities: Strength: Patient accepts guidance/feedback, Strength: Patient is expressive/articulate., Strength: Patient is intelligent., Strength: Patient is motivated for change., Liability: Patient is dependent., Liability: Patient has no suport network., Liability: Patient lacks coping skills.    Current Evaluation:     Mental Status Exam:  General Appearance:  unremarkable, age appropriate   Speech: normal tone, normal rate, normal pitch, normal volume      Level of Cooperation: cooperative      Thought Processes: normal and logical   Mood: anxious, depressed, sad      Thought Content: normal, no suicidality, no homicidality, delusions, or paranoia   Affect: mood-congruent   Orientation: Oriented x3   Memory: recent >  intact   Attention Span & Concentration: intact   Fund of General Knowledge: intact and appropriate to age and level of education   Abstract Reasoning: interpretation of similarities was abstract   Judgment & Insight: fair      Language  intact     Diagnostic Impression - Plan:     Major Depressive Disorder, recurrent, moderate; Generalized Anxiety Disorder    Plan:individual psychotherapy, consult psychiatrist for medication evaluation and attend Al-Anon meetings and begin working with a sponsor    Return to Clinic: 2 weeks    Length of Service (minutes): 60

## 2019-03-14 ENCOUNTER — CLINICAL SUPPORT (OUTPATIENT)
Dept: INTERNAL MEDICINE | Facility: CLINIC | Age: 74
End: 2019-03-14
Payer: MEDICARE

## 2019-03-14 DIAGNOSIS — E11.8 CONTROLLED TYPE 2 DIABETES MELLITUS WITH COMPLICATION, WITHOUT LONG-TERM CURRENT USE OF INSULIN: ICD-10-CM

## 2019-03-14 LAB
ALBUMIN SERPL BCP-MCNC: 4.2 G/DL
ALBUMIN/CREAT UR: 14.6 UG/MG
ALP SERPL-CCNC: 62 U/L
ALT SERPL W/O P-5'-P-CCNC: 18 U/L
ANION GAP SERPL CALC-SCNC: 7 MMOL/L
AST SERPL-CCNC: 19 U/L
BASOPHILS # BLD AUTO: 0.06 K/UL
BASOPHILS NFR BLD: 0.9 %
BILIRUB SERPL-MCNC: 1.1 MG/DL
BUN SERPL-MCNC: 16 MG/DL
CALCIUM SERPL-MCNC: 9.7 MG/DL
CHLORIDE SERPL-SCNC: 104 MMOL/L
CO2 SERPL-SCNC: 29 MMOL/L
CREAT SERPL-MCNC: 0.7 MG/DL
CREAT UR-MCNC: 82 MG/DL
DIFFERENTIAL METHOD: NORMAL
EOSINOPHIL # BLD AUTO: 0.1 K/UL
EOSINOPHIL NFR BLD: 1.9 %
ERYTHROCYTE [DISTWIDTH] IN BLOOD BY AUTOMATED COUNT: 12.5 %
EST. GFR  (AFRICAN AMERICAN): >60 ML/MIN/1.73 M^2
EST. GFR  (NON AFRICAN AMERICAN): >60 ML/MIN/1.73 M^2
ESTIMATED AVG GLUCOSE: 120 MG/DL
GLUCOSE SERPL-MCNC: 114 MG/DL
HBA1C MFR BLD HPLC: 5.8 %
HCT VFR BLD AUTO: 42.8 %
HGB BLD-MCNC: 14.2 G/DL
IMM GRANULOCYTES # BLD AUTO: 0.01 K/UL
IMM GRANULOCYTES NFR BLD AUTO: 0.1 %
LYMPHOCYTES # BLD AUTO: 2.2 K/UL
LYMPHOCYTES NFR BLD: 32.3 %
MCH RBC QN AUTO: 27.3 PG
MCHC RBC AUTO-ENTMCNC: 33.2 G/DL
MCV RBC AUTO: 82 FL
MICROALBUMIN UR DL<=1MG/L-MCNC: 12 UG/ML
MONOCYTES # BLD AUTO: 0.5 K/UL
MONOCYTES NFR BLD: 7.4 %
NEUTROPHILS # BLD AUTO: 3.9 K/UL
NEUTROPHILS NFR BLD: 57.4 %
NRBC BLD-RTO: 0 /100 WBC
PLATELET # BLD AUTO: 259 K/UL
PMV BLD AUTO: 9.7 FL
POTASSIUM SERPL-SCNC: 4.2 MMOL/L
PROT SERPL-MCNC: 6.8 G/DL
RBC # BLD AUTO: 5.21 M/UL
SODIUM SERPL-SCNC: 140 MMOL/L
WBC # BLD AUTO: 6.78 K/UL

## 2019-03-14 PROCEDURE — 36415 COLL VENOUS BLD VENIPUNCTURE: CPT | Mod: HCNC,S$GLB,, | Performed by: FAMILY MEDICINE

## 2019-03-14 PROCEDURE — 82043 UR ALBUMIN QUANTITATIVE: CPT | Mod: HCNC

## 2019-03-14 PROCEDURE — 80053 COMPREHEN METABOLIC PANEL: CPT | Mod: HCNC

## 2019-03-14 PROCEDURE — 99999 PR PBB SHADOW E&M-EST. PATIENT-LVL I: ICD-10-PCS | Mod: PBBFAC,HCNC,,

## 2019-03-14 PROCEDURE — 85025 COMPLETE CBC W/AUTO DIFF WBC: CPT | Mod: HCNC

## 2019-03-14 PROCEDURE — 36415 PR COLLECTION VENOUS BLOOD,VENIPUNCTURE: ICD-10-PCS | Mod: HCNC,S$GLB,, | Performed by: FAMILY MEDICINE

## 2019-03-14 PROCEDURE — 99999 PR PBB SHADOW E&M-EST. PATIENT-LVL I: CPT | Mod: PBBFAC,HCNC,,

## 2019-03-14 PROCEDURE — 83036 HEMOGLOBIN GLYCOSYLATED A1C: CPT | Mod: HCNC

## 2019-03-19 ENCOUNTER — OFFICE VISIT (OUTPATIENT)
Dept: INTERNAL MEDICINE | Facility: CLINIC | Age: 74
End: 2019-03-19
Payer: MEDICARE

## 2019-03-19 ENCOUNTER — CLINICAL SUPPORT (OUTPATIENT)
Dept: INTERNAL MEDICINE | Facility: CLINIC | Age: 74
End: 2019-03-19
Payer: MEDICARE

## 2019-03-19 VITALS
HEART RATE: 80 BPM | TEMPERATURE: 97 F | BODY MASS INDEX: 25.92 KG/M2 | HEIGHT: 65 IN | SYSTOLIC BLOOD PRESSURE: 131 MMHG | DIASTOLIC BLOOD PRESSURE: 73 MMHG | OXYGEN SATURATION: 95 % | WEIGHT: 155.56 LBS

## 2019-03-19 DIAGNOSIS — E78.5 HYPERLIPIDEMIA ASSOCIATED WITH TYPE 2 DIABETES MELLITUS: ICD-10-CM

## 2019-03-19 DIAGNOSIS — I49.49 ECTOPIC CARDIAC BEATS: ICD-10-CM

## 2019-03-19 DIAGNOSIS — F43.0 STRESS REACTION: Primary | ICD-10-CM

## 2019-03-19 DIAGNOSIS — E11.8 CONTROLLED TYPE 2 DIABETES MELLITUS WITH COMPLICATION, WITHOUT LONG-TERM CURRENT USE OF INSULIN: ICD-10-CM

## 2019-03-19 DIAGNOSIS — E11.69 HYPERLIPIDEMIA ASSOCIATED WITH TYPE 2 DIABETES MELLITUS: ICD-10-CM

## 2019-03-19 DIAGNOSIS — E11.9 DIABETES MELLITUS TYPE 2 WITHOUT RETINOPATHY: ICD-10-CM

## 2019-03-19 PROCEDURE — 93010 EKG 12-LEAD: ICD-10-PCS | Mod: HCNC,S$GLB,, | Performed by: INTERNAL MEDICINE

## 2019-03-19 PROCEDURE — 3078F PR MOST RECENT DIASTOLIC BLOOD PRESSURE < 80 MM HG: ICD-10-PCS | Mod: HCNC,CPTII,S$GLB, | Performed by: FAMILY MEDICINE

## 2019-03-19 PROCEDURE — 3044F HG A1C LEVEL LT 7.0%: CPT | Mod: HCNC,CPTII,S$GLB, | Performed by: FAMILY MEDICINE

## 2019-03-19 PROCEDURE — 1101F PR PT FALLS ASSESS DOC 0-1 FALLS W/OUT INJ PAST YR: ICD-10-PCS | Mod: HCNC,CPTII,S$GLB, | Performed by: FAMILY MEDICINE

## 2019-03-19 PROCEDURE — 99499 UNLISTED E&M SERVICE: CPT | Mod: HCNC,S$GLB,, | Performed by: FAMILY MEDICINE

## 2019-03-19 PROCEDURE — 99999 PR PBB SHADOW E&M-EST. PATIENT-LVL III: CPT | Mod: PBBFAC,HCNC,, | Performed by: FAMILY MEDICINE

## 2019-03-19 PROCEDURE — 93005 ELECTROCARDIOGRAM TRACING: CPT | Mod: HCNC,S$GLB,, | Performed by: FAMILY MEDICINE

## 2019-03-19 PROCEDURE — 99214 PR OFFICE/OUTPT VISIT, EST, LEVL IV, 30-39 MIN: ICD-10-PCS | Mod: HCNC,S$GLB,, | Performed by: FAMILY MEDICINE

## 2019-03-19 PROCEDURE — 93010 ELECTROCARDIOGRAM REPORT: CPT | Mod: HCNC,S$GLB,, | Performed by: INTERNAL MEDICINE

## 2019-03-19 PROCEDURE — 3075F SYST BP GE 130 - 139MM HG: CPT | Mod: HCNC,CPTII,S$GLB, | Performed by: FAMILY MEDICINE

## 2019-03-19 PROCEDURE — 99499 RISK ADDL DX/OHS AUDIT: ICD-10-PCS | Mod: HCNC,S$GLB,, | Performed by: FAMILY MEDICINE

## 2019-03-19 PROCEDURE — 99214 OFFICE O/P EST MOD 30 MIN: CPT | Mod: HCNC,S$GLB,, | Performed by: FAMILY MEDICINE

## 2019-03-19 PROCEDURE — 3044F PR MOST RECENT HEMOGLOBIN A1C LEVEL <7.0%: ICD-10-PCS | Mod: HCNC,CPTII,S$GLB, | Performed by: FAMILY MEDICINE

## 2019-03-19 PROCEDURE — 3078F DIAST BP <80 MM HG: CPT | Mod: HCNC,CPTII,S$GLB, | Performed by: FAMILY MEDICINE

## 2019-03-19 PROCEDURE — 93005 EKG 12-LEAD: ICD-10-PCS | Mod: HCNC,S$GLB,, | Performed by: FAMILY MEDICINE

## 2019-03-19 PROCEDURE — 3075F PR MOST RECENT SYSTOLIC BLOOD PRESS GE 130-139MM HG: ICD-10-PCS | Mod: HCNC,CPTII,S$GLB, | Performed by: FAMILY MEDICINE

## 2019-03-19 PROCEDURE — 1101F PT FALLS ASSESS-DOCD LE1/YR: CPT | Mod: HCNC,CPTII,S$GLB, | Performed by: FAMILY MEDICINE

## 2019-03-19 PROCEDURE — 99999 PR PBB SHADOW E&M-EST. PATIENT-LVL III: ICD-10-PCS | Mod: PBBFAC,HCNC,, | Performed by: FAMILY MEDICINE

## 2019-03-19 RX ORDER — BUPROPION HYDROCHLORIDE 150 MG/1
150 TABLET ORAL DAILY
Qty: 30 TABLET | Refills: 2 | Status: SHIPPED | OUTPATIENT
Start: 2019-03-19 | End: 2019-05-06 | Stop reason: SDUPTHER

## 2019-03-19 RX ORDER — ESOMEPRAZOLE MAGNESIUM 40 MG/1
40 CAPSULE, DELAYED RELEASE ORAL
COMMUNITY
End: 2019-03-19

## 2019-03-19 NOTE — PATIENT INSTRUCTIONS
Follow up with psychiatrist as discussed for further med adjustments/changes.  Let me know if any problems before then.

## 2019-03-19 NOTE — PROGRESS NOTES
Subjective:       Patient ID: Connie Fields is a 73 y.o. female.    Chief Complaint: 6 mth check up and f/u labs    Patient with type 2 diabetes, hyperlipidemia here for scheduled recheck with recent labs.   At last visit I decreased her metformin to 3 daily 2/2 diarrhea - she now continues to have episodes but related to high levels stress. She will use immodium 1-2. Also with fatty foods. Much improved on 3 metformin instead of 4 - she would like to continue at this level. A1C remains in excellent control.lipids controlled on current pravastatin with LDL 49.6.  metanx was suggested last visit for paresthesias feet - she did not get it filled and sts rare occurrences currently.  Lab Results       Component                Value               Date                       WBC                      6.78                03/14/2019                 HGB                      14.2                03/14/2019                 HCT                      42.8                03/14/2019                 PLT                      259                 03/14/2019                 CHOL                     114 (L)             09/12/2018                 TRIG                     87                  09/12/2018                 HDL                      47                  09/12/2018                 LDLCALC                  49.6 (L)            09/12/2018                 ALT                      18                  03/14/2019                 AST                      19                  03/14/2019                 NA                       140                 03/14/2019                 K                        4.2                 03/14/2019                 CL                       104                 03/14/2019                 CALCIUM                  9.7                 03/14/2019                 CREATININE               0.7                 03/14/2019                 BUN                      16                  03/14/2019                 CO2                       29                  03/14/2019                 GLU                      114 (H)             03/14/2019                 ESTGFRAFRICA             >60.0               03/14/2019                 EGFRNONAA                >60.0               03/14/2019                 HGBA1C                   5.8 (H)             03/14/2019                 MICALBCREAT              14.6                03/14/2019      She is having increased stressors at home. Was seen early this month by another provider here - referrals were made and she has seen counselor once and has scheduled recheck. Has appt with psychiatrist. She is continuing to participate in her 2 x week bowling - no longer enjoys it. Walks at [facility] - has no energy to do this.               Diabetes   She presents for her follow-up diabetic visit. She has type 2 diabetes mellitus. Her disease course has been stable. (Rare episodes when she has delayed eating) Associated symptoms include chest pain (when increased anxiety/stress feels chest tighten - uneasiness), fatigue and foot paresthesias (rarely). There are no hypoglycemic complications. Symptoms are stable. Diabetic complications include peripheral neuropathy. Pertinent negatives for diabetic complications include no nephropathy or retinopathy. Current diabetic treatment includes oral agent (monotherapy). She is compliant with treatment all of the time. Her weight is stable. She is following a generally healthy diet. Meal planning includes avoidance of concentrated sweets. She rarely participates in exercise. Her home blood glucose trend is fluctuating minimally. Her breakfast blood glucose range is generally 110-130 mg/dl. An ACE inhibitor/angiotensin II receptor blocker is not being taken. She does not see a podiatrist.Eye exam is current.     Review of Systems   Constitutional: Positive for activity change, appetite change and fatigue.   Respiratory: Positive for chest tightness. Negative for shortness of  breath.    Cardiovascular: Positive for chest pain (when increased anxiety/stress feels chest tighten - uneasiness).   Gastrointestinal: Positive for abdominal pain and diarrhea.   Psychiatric/Behavioral: Positive for dysphoric mood and sleep disturbance.       Objective:      Physical Exam   Constitutional: She is oriented to person, place, and time. She appears well-developed and well-nourished.   HENT:   Head: Normocephalic and atraumatic.   Right Ear: External ear normal.   Left Ear: External ear normal.   Mouth/Throat: Oropharynx is clear and moist. No oropharyngeal exudate.   Neck: Normal range of motion. Neck supple.   Cardiovascular: Normal rate, regular rhythm and normal heart sounds.   Pulses:       Dorsalis pedis pulses are 2+ on the right side, and 2+ on the left side.        Posterior tibial pulses are 1+ on the right side, and 1+ on the left side.   Frequent ectopy. Decreased frequency supine.   Pulmonary/Chest: Effort normal and breath sounds normal.   Abdominal: Soft. Bowel sounds are normal. She exhibits no distension. There is no tenderness.   Musculoskeletal: She exhibits no edema.        Right foot: There is normal range of motion and no deformity.        Left foot: There is normal range of motion and no deformity.   Feet:   Right Foot:   Protective Sensation: 10 sites tested. 10 sites sensed.   Skin Integrity: Negative for ulcer or skin breakdown.   Left Foot:   Protective Sensation: 10 sites tested. 10 sites sensed.   Skin Integrity: Negative for ulcer or skin breakdown.   Neurological: She is alert and oriented to person, place, and time.   Skin: Skin is warm and dry.   Psychiatric: She has a normal mood and affect. Her behavior is normal.         Assessment/Plan:     1. Stress reaction  buPROPion (WELLBUTRIN XL) 150 MG TB24 tablet   2. Ectopic cardiac beats  EKG 12-lead   3. Controlled type 2 diabetes mellitus with complication, without long-term current use of insulin  Hemoglobin A1c     Basic metabolic panel   4. Hyperlipidemia associated with type 2 diabetes mellitus  Lipid panel   5. Diabetes mellitus type 2 without retinopathy      Patient with increased depression due to external stressors primarily.  Currently on 40 mg paroxetine.  Reviewed options such as switching to another serotonin reuptake inhibitor, switching to Cymbalta.  She has been on paroxetine for many years.  Recommend addition of Wellbutrin  to her paroxetine and if tolerated and needed, increase. She does have psychiatrist visit scheduled - will defer to psych for adjustments or changes.  Regular recheck 6 months with me.  Reviewed that we may be able to decrease her pravastatin dose - she would like to reevaluate when it is checked in 6 months.

## 2019-04-01 ENCOUNTER — TELEPHONE (OUTPATIENT)
Dept: INTERNAL MEDICINE | Facility: CLINIC | Age: 74
End: 2019-04-01

## 2019-04-01 NOTE — TELEPHONE ENCOUNTER
----- Message from [place] sent at 4/1/2019 10:50 AM CDT -----  Contact: pt  Type:  Needs Medical Advice    Who Called:GREGORY ANNA   Symptoms (please be specific):   How long has patient had these symptoms:   Pharmacy name and phone #:    Would the patient rather a call back or a response via [facility]?  Call   Best Call Back Number:   [phone] (work)  Additional Information: pt is requesting a call back from the nurse in regards to the pt rescheduling her up coming nurses visit on 04/16/2019

## 2019-04-01 NOTE — TELEPHONE ENCOUNTER
Pt called requesting info in regards to upcomming enhanced wellness visit with NP. Informed pt that EWV is to be more in depth than regular wellness visit and all information will be presented to PCP. Pt states she will keep EWV appt.

## 2019-04-02 ENCOUNTER — OFFICE VISIT (OUTPATIENT)
Dept: PSYCHIATRY | Facility: CLINIC | Age: 74
End: 2019-04-02
Payer: MEDICARE

## 2019-04-02 DIAGNOSIS — F41.1 GENERALIZED ANXIETY DISORDER: ICD-10-CM

## 2019-04-02 DIAGNOSIS — F33.1 MAJOR DEPRESSIVE DISORDER, RECURRENT EPISODE, MODERATE: Primary | ICD-10-CM

## 2019-04-02 PROCEDURE — 99499 RISK ADDL DX/OHS AUDIT: ICD-10-PCS | Mod: HCNC,S$GLB,, | Performed by: SOCIAL WORKER

## 2019-04-02 PROCEDURE — 90834 PR PSYCHOTHERAPY W/PATIENT, 45 MIN: ICD-10-PCS | Mod: HCNC,S$GLB,, | Performed by: SOCIAL WORKER

## 2019-04-02 PROCEDURE — 99499 UNLISTED E&M SERVICE: CPT | Mod: HCNC,S$GLB,, | Performed by: SOCIAL WORKER

## 2019-04-02 PROCEDURE — 90834 PSYTX W PT 45 MINUTES: CPT | Mod: HCNC,S$GLB,, | Performed by: SOCIAL WORKER

## 2019-04-02 NOTE — PROGRESS NOTES
Individual Psychotherapy (PhD/LCSW)    4/2/2019    Site:  [place]         Therapeutic Intervention: Met with patient.  Outpatient - Insight oriented psychotherapy 45 min - CPT code 73646    Chief complaint/reason for encounter: depression, anxiety, sleep and interpersonal     Interval history and content of current session: Pt reportedly doing better today. She has attended several Al-nirali meetings and plans to get a sponsor. She has begun to set boundaries with her son and recognizes that she cannot rescue him. She is struggling with guilt about not supporting son financially any longer. She is getting along better with hsb. Helped pt process feelings and provided education re: addiction as a family disease, recovery from codependency, and the importance of self-compassion. Recommended pt read The Language of Letting Go and Codependent No More by [place].    Treatment plan:  · Target symptoms: depression, anxiety   · Why chosen therapy is appropriate versus another modality: relevant to diagnosis, patient responds to this modality, evidence based practice  · Outcome monitoring methods: self-report, observation  · Therapeutic intervention type: insight oriented psychotherapy    Risk parameters:  Patient reports no suicidal ideation  Patient reports no homicidal ideation  Patient reports no self-injurious behavior  Patient reports no violent behavior    Verbal deficits: None    Patient's response to intervention:  The patient's response to intervention is accepting, motivated.    Progress toward goals and other mental status changes:  The patient's progress toward goals is good.    Diagnosis:     ICD-10-CM ICD-9-CM   1. Major depressive disorder, recurrent episode, moderate F33.1 296.32   2. Generalized anxiety disorder F41.1 300.02       Plan:  individual psychotherapy, medication management by physician and Al-Nirali    Return to clinic: 2 weeks    Length of Service (minutes): 45

## 2019-04-08 DIAGNOSIS — E78.2 MIXED HYPERLIPIDEMIA: ICD-10-CM

## 2019-04-08 DIAGNOSIS — E11.69 HYPERLIPIDEMIA ASSOCIATED WITH TYPE 2 DIABETES MELLITUS: ICD-10-CM

## 2019-04-08 DIAGNOSIS — E78.5 HYPERLIPIDEMIA ASSOCIATED WITH TYPE 2 DIABETES MELLITUS: ICD-10-CM

## 2019-04-08 RX ORDER — METFORMIN HYDROCHLORIDE 500 MG/1
TABLET, EXTENDED RELEASE ORAL
Qty: 360 TABLET | Refills: 3 | OUTPATIENT
Start: 2019-04-08

## 2019-04-08 RX ORDER — PRAVASTATIN SODIUM 40 MG/1
40 TABLET ORAL DAILY
Qty: 90 TABLET | Refills: 3 | OUTPATIENT
Start: 2019-04-08

## 2019-04-16 ENCOUNTER — OFFICE VISIT (OUTPATIENT)
Dept: INTERNAL MEDICINE | Facility: CLINIC | Age: 74
End: 2019-04-16
Payer: MEDICARE

## 2019-04-16 VITALS
WEIGHT: 142 LBS | SYSTOLIC BLOOD PRESSURE: 122 MMHG | HEIGHT: 66 IN | HEART RATE: 88 BPM | BODY MASS INDEX: 22.82 KG/M2 | DIASTOLIC BLOOD PRESSURE: 52 MMHG | OXYGEN SATURATION: 97 %

## 2019-04-16 DIAGNOSIS — E78.5 HYPERLIPIDEMIA ASSOCIATED WITH TYPE 2 DIABETES MELLITUS: ICD-10-CM

## 2019-04-16 DIAGNOSIS — E11.69 HYPERLIPIDEMIA ASSOCIATED WITH TYPE 2 DIABETES MELLITUS: ICD-10-CM

## 2019-04-16 DIAGNOSIS — F41.1 GENERALIZED ANXIETY DISORDER: ICD-10-CM

## 2019-04-16 DIAGNOSIS — Z00.00 ENCOUNTER FOR PREVENTIVE HEALTH EXAMINATION: Primary | ICD-10-CM

## 2019-04-16 DIAGNOSIS — F33.1 MAJOR DEPRESSIVE DISORDER, RECURRENT EPISODE, MODERATE: ICD-10-CM

## 2019-04-16 PROCEDURE — G0439 PPPS, SUBSEQ VISIT: HCPCS | Mod: HCNC,S$GLB,, | Performed by: NURSE PRACTITIONER

## 2019-04-16 PROCEDURE — 99999 PR PBB SHADOW E&M-EST. PATIENT-LVL IV: ICD-10-PCS | Mod: PBBFAC,HCNC,, | Performed by: NURSE PRACTITIONER

## 2019-04-16 PROCEDURE — 3074F SYST BP LT 130 MM HG: CPT | Mod: HCNC,CPTII,S$GLB, | Performed by: NURSE PRACTITIONER

## 2019-04-16 PROCEDURE — 3074F PR MOST RECENT SYSTOLIC BLOOD PRESSURE < 130 MM HG: ICD-10-PCS | Mod: HCNC,CPTII,S$GLB, | Performed by: NURSE PRACTITIONER

## 2019-04-16 PROCEDURE — 3044F PR MOST RECENT HEMOGLOBIN A1C LEVEL <7.0%: ICD-10-PCS | Mod: HCNC,CPTII,S$GLB, | Performed by: NURSE PRACTITIONER

## 2019-04-16 PROCEDURE — 99999 PR PBB SHADOW E&M-EST. PATIENT-LVL IV: CPT | Mod: PBBFAC,HCNC,, | Performed by: NURSE PRACTITIONER

## 2019-04-16 PROCEDURE — 3078F DIAST BP <80 MM HG: CPT | Mod: HCNC,CPTII,S$GLB, | Performed by: NURSE PRACTITIONER

## 2019-04-16 PROCEDURE — 3044F HG A1C LEVEL LT 7.0%: CPT | Mod: HCNC,CPTII,S$GLB, | Performed by: NURSE PRACTITIONER

## 2019-04-16 PROCEDURE — 3078F PR MOST RECENT DIASTOLIC BLOOD PRESSURE < 80 MM HG: ICD-10-PCS | Mod: HCNC,CPTII,S$GLB, | Performed by: NURSE PRACTITIONER

## 2019-04-16 PROCEDURE — G0439 PR MEDICARE ANNUAL WELLNESS SUBSEQUENT VISIT: ICD-10-PCS | Mod: HCNC,S$GLB,, | Performed by: NURSE PRACTITIONER

## 2019-04-16 NOTE — PROGRESS NOTES
"Connie Fields presented for a  Medicare AWV and comprehensive Health Risk Assessment today. The following components were reviewed and updated:    · Medical history  · Family History  · Social history  · Allergies and Current Medications  · Health Risk Assessment  · Health Maintenance  · Care Team     ** See Completed Assessments for Annual Wellness Visit within the encounter summary.**       The following assessments were completed:  · Living Situation  · CAGE  · Depression Screening  · Timed Get Up and Go  · Whisper Test  · Cognitive Function Screening  · Nutrition Screening  · ADL Screening  · PAQ Screening    Vitals:    04/16/19 1055   BP: (!) 122/52   BP Location: Left arm   Patient Position: Sitting   BP Method: Medium (Manual)   Pulse: 88   SpO2: 97%   Weight: 64.4 kg (141 lb 15.6 oz)   Height: 5' 5.75" (1.67 m)     Body mass index is 23.09 kg/m².  Physical Exam   Constitutional: She is oriented to person, place, and time. She appears well-developed and well-nourished.   HENT:   Head: Normocephalic.   Pulmonary/Chest: Effort normal. No respiratory distress.   Musculoskeletal: Normal range of motion.   Neurological: She is alert and oriented to person, place, and time. She exhibits normal muscle tone.   Psychiatric: She has a normal mood and affect. Her speech is normal and behavior is normal.   Nursing note and vitals reviewed.        Diagnoses and health risks identified today and associated recommendations/orders:    1. Encounter for preventive health examination  Reports she feels fine.   She will discuss Tetanus vaccine with PCP.     2. Major depressive disorder, recurrent episode, moderate  Discussed the importance of not abruptly stopping medication to first consult with PCP. She expressed understanding.    PHQ 2-0.   Stable and controlled. Continue current treatment plan as previously prescribed with your PCP and , ASHLYN Vasquez.    3. Generalized anxiety disorder  See #2    4. Hyperlipidemia " associated with type 2 diabetes mellitus  A1C 5.8  Stable and controlled (Hyperlipidemia and DM). Continue current treatment plan as previously prescribed with your PCP.       Provided Connie with a 5-10 year written screening schedule and personal prevention plan.  After Visit Summary printed and given to patient which includes a list of additional screenings\tests needed.    Follow up in about 1 year (around 4/16/2020) for AWV.    Estela Oliveira NP  I offered to discuss end of life issues, including information on how to make advance directives that the patient could use to name someone who would make medical decisions on their behalf if they became too ill to make themselves.    ___Patient declined  _X_Patient is interested, I provided paper work and offered to discuss.

## 2019-04-16 NOTE — PATIENT INSTRUCTIONS
Counseling and Referral of Other Preventative  (Italic type indicates deductible and co-insurance are waived)    Patient Name: Connie Fields  Today's Date: 4/16/2019    Health Maintenance       Date Due Completion Date    TETANUS VACCINE 06/30/1963 ---    Lipid Panel 09/12/2019 9/12/2018    Override on 1/31/2014: Done    Hemoglobin A1c 09/14/2019 3/14/2019    Override on 1/22/2014: Done    Mammogram 10/17/2019 10/17/2018    Override on 7/23/2015: Not Clinically Appropriate (gyn)    Eye Exam 11/26/2019 11/26/2018    Override on 1/22/2015: Done    Urine Microalbumin 03/14/2020 3/14/2019    Override on 8/6/2013: Done    Foot Exam 03/19/2020 3/19/2019    Override on 2/18/2015: Done    Override on 1/22/2014: Done    Override on 8/6/2013: Done    Low Dose Statin 03/21/2020 3/21/2019    DEXA SCAN 10/03/2020 10/3/2017    Override on 7/23/2015: Not Clinically Appropriate (gyn)    Colonoscopy 12/06/2021 12/6/2016        No orders of the defined types were placed in this encounter.    The following information is provided to all patients.  This information is to help you find resources for any of the problems found today that may be affecting your health:                Living healthy guide: www.[facility].gov      Understanding Diabetes: www.diabetes.org      Eating healthy: www.cdc.gov/healthyweight      CDC home safety checklist: www.cdc.gov/steadi/patient.html      Agency on Aging: www.goea.[place].gov      Alcoholics anonymous (AA): www.aa.org      Physical Activity: www.ramsey.nih.gov/li4wjzh      Tobacco use: www.quitwithusla.org

## 2019-05-06 DIAGNOSIS — F43.0 STRESS REACTION: ICD-10-CM

## 2019-05-06 RX ORDER — BUPROPION HYDROCHLORIDE 150 MG/1
150 TABLET ORAL DAILY
Qty: 90 TABLET | Refills: 1 | Status: SHIPPED | OUTPATIENT
Start: 2019-05-06 | End: 2019-10-30 | Stop reason: SDUPTHER

## 2019-05-08 NOTE — TELEPHONE ENCOUNTER
Patient informed of her script being sent to her pharmacy and verbally understood the information given.

## 2019-05-20 DIAGNOSIS — G47.00 INSOMNIA, UNSPECIFIED TYPE: ICD-10-CM

## 2019-05-20 RX ORDER — TRAZODONE HYDROCHLORIDE 50 MG/1
TABLET ORAL
Qty: 90 TABLET | Refills: 3 | OUTPATIENT
Start: 2019-05-20

## 2019-05-29 ENCOUNTER — OFFICE VISIT (OUTPATIENT)
Dept: INTERNAL MEDICINE | Facility: CLINIC | Age: 74
End: 2019-05-29
Payer: MEDICARE

## 2019-05-29 VITALS
DIASTOLIC BLOOD PRESSURE: 89 MMHG | BODY MASS INDEX: 24.24 KG/M2 | HEART RATE: 87 BPM | OXYGEN SATURATION: 100 % | TEMPERATURE: 98 F | HEIGHT: 66 IN | WEIGHT: 150.81 LBS | SYSTOLIC BLOOD PRESSURE: 140 MMHG

## 2019-05-29 DIAGNOSIS — M54.2 NECK PAIN ON LEFT SIDE: ICD-10-CM
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
## 2019-05-29 PROCEDURE — 3077F SYST BP >= 140 MM HG: CPT | Mod: HCNC,CPTII,S$GLB, | Performed by: FAMILY MEDICINE

## 2019-05-29 PROCEDURE — 99999 PR PBB SHADOW E&M-EST. PATIENT-LVL III: CPT | Mod: PBBFAC,HCNC,, | Performed by: FAMILY MEDICINE

## 2019-05-29 PROCEDURE — 3079F PR MOST RECENT DIASTOLIC BLOOD PRESSURE 80-89 MM HG: ICD-10-PCS | Mod: HCNC,CPTII,S$GLB, | Performed by: FAMILY MEDICINE

## 2019-05-29 PROCEDURE — 96372 PR INJECTION,THERAP/PROPH/DIAG2ST, IM OR SUBCUT: ICD-10-PCS | Mod: HCNC,S$GLB,, | Performed by: FAMILY MEDICINE

## 2019-05-29 PROCEDURE — 99999 PR PBB SHADOW E&M-EST. PATIENT-LVL III: ICD-10-PCS | Mod: PBBFAC,HCNC,, | Performed by: FAMILY MEDICINE

## 2019-05-29 PROCEDURE — 99213 OFFICE O/P EST LOW 20 MIN: CPT | Mod: 25,HCNC,S$GLB, | Performed by: FAMILY MEDICINE

## 2019-05-29 PROCEDURE — 1101F PR PT FALLS ASSESS DOC 0-1 FALLS W/OUT INJ PAST YR: ICD-10-PCS | Mod: HCNC,CPTII,S$GLB, | Performed by: FAMILY MEDICINE

## 2019-05-29 PROCEDURE — 99213 PR OFFICE/OUTPT VISIT, EST, LEVL III, 20-29 MIN: ICD-10-PCS | Mod: 25,HCNC,S$GLB, | Performed by: FAMILY MEDICINE

## 2019-05-29 PROCEDURE — 3077F PR MOST RECENT SYSTOLIC BLOOD PRESSURE >= 140 MM HG: ICD-10-PCS | Mod: HCNC,CPTII,S$GLB, | Performed by: FAMILY MEDICINE

## 2019-05-29 PROCEDURE — 3079F DIAST BP 80-89 MM HG: CPT | Mod: HCNC,CPTII,S$GLB, | Performed by: FAMILY MEDICINE

## 2019-05-29 PROCEDURE — 96372 THER/PROPH/DIAG INJ SC/IM: CPT | Mod: HCNC,S$GLB,, | Performed by: FAMILY MEDICINE

## 2019-05-29 PROCEDURE — 1101F PT FALLS ASSESS-DOCD LE1/YR: CPT | Mod: HCNC,CPTII,S$GLB, | Performed by: FAMILY MEDICINE

## 2019-05-29 RX ORDER — PREDNISONE 20 MG/1
20 TABLET ORAL DAILY
Refills: 0 | COMMUNITY
Start: 2019-05-25 | End: 2019-09-20 | Stop reason: ALTCHOICE

## 2019-05-29 RX ORDER — ONDANSETRON 8 MG/1
TABLET, ORALLY DISINTEGRATING ORAL
Refills: 0 | COMMUNITY
Start: 2019-05-25 | End: 2019-09-20 | Stop reason: ALTCHOICE

## 2019-05-29 RX ORDER — NAPROXEN 500 MG/1
500 TABLET ORAL 2 TIMES DAILY
Qty: 30 TABLET | Refills: 0 | Status: SHIPPED | OUTPATIENT
Start: 2019-05-29 | End: 2019-09-20 | Stop reason: ALTCHOICE

## 2019-05-29 RX ORDER — KETOROLAC TROMETHAMINE 30 MG/ML
15 INJECTION, SOLUTION INTRAMUSCULAR; INTRAVENOUS ONCE
Status: COMPLETED | OUTPATIENT
Start: 2019-05-29 | End: 2019-05-29

## 2019-05-29 RX ORDER — BACLOFEN 20 MG/1
TABLET ORAL
Refills: 0 | COMMUNITY
Start: 2019-05-25 | End: 2019-09-20

## 2019-05-29 RX ADMIN — KETOROLAC TROMETHAMINE 15 MG: 30 INJECTION, SOLUTION INTRAMUSCULAR; INTRAVENOUS at 11:05

## 2019-05-29 NOTE — PATIENT INSTRUCTIONS
Neck Exercises: Active Neck Rotation    To start, lie on your back, knees bent and feet flat on the floor. Keep your ears, shoulders, and hips aligned, but dont press your lower back to the floor. Rest your hands on your pelvis. Breathe deeply and relax.  · Use your neck muscles to turn your head to one side until you feel a stretch in the muscles.  · Hold for 5 seconds. Then turn to the other side.  · Repeat 5 times on each side.  Note: Keep your shoulders on the floor. Dont lift or tuck your chin as you turn your head.  Date Last Reviewed: 8/16/2015  © 0164-4555 [employer]. [address] [place] [address]. All rights reserved. This information is not intended as a substitute for professional medical care. Always follow your healthcare professional's instructions.        Neck Exercises: Head Lifts       Do this exercise on your hands and knees. Keep your knees under your hips and your hands under your shoulders. Keep your spine in a neutral position (not arched or sagging). Keep your ears in line with your shoulders. Hold for a few seconds before starting the exercise:  · Keeping your back straight, slowly drop your chin toward your chest. Tuck in your chin.  · Hold for 5 seconds. Then lift your head until your neck is level with your back.  · Hold for 5 seconds. Repeat 5 times.  Date Last Reviewed: 10/1/2015  © 6988-7739 [employer]. [address] [place] [address]. All rights reserved. This information is not intended as a substitute for professional medical care. Always follow your healthcare professional's instructions.        Neck Exercises: Neck Flex  To start, sit in a chair with your feet flat on the floor. Your weight should be slightly forward so that youre balanced evenly on your buttocks. Relax your shoulders and keep your head level. Avoid arching your back or rounding your shoulders. Using a chair with arms may help you keep your balance:  · Rest  the back of your left hand against your lower back. Place your right palm on the top of your head.  · Gently pull your head forward and down until you feel a stretch in the muscles in the back of your neck. Dont force the motion.  · Hold for 20 seconds, then return to starting position. Switch arms.    Date Last Reviewed: 10/2/2015  © 3123-3148 [employer]. [address] [place] [address]. All rights reserved. This information is not intended as a substitute for professional medical care. Always follow your healthcare professional's instructions.        Neck Exercises: Passive Neck Rotation    To start, lie on your back, knees bent and feet flat on the floor. Keep your ears, shoulders, and hips aligned, but dont press your lower back to the floor. Rest your hands on your pelvis. Breathe deeply and relax.  · With your neck relaxed, place the palm of one hand on your forehead. Use your hand to turn your head to one side until you feel a stretch in the neck muscles. Do not push through pain.  · Hold for 5 seconds. Then turn to the other side.  · Repeat 5 times on each side.   Note: Keep your shoulders on the floor. Dont lift your chin as you turn your head.  Date Last Reviewed: 8/16/2015 © 2000-2017 [employer]. [address] [place] [address]. All rights reserved. This information is not intended as a substitute for professional medical care. Always follow your healthcare professional's instructions.        Naproxen and naproxen sodium oral immediate-release tablets  What is this medicine?  NAPROXEN (na PROX en) is a non-steroidal anti-inflammatory drug (NSAID). It is used to reduce swelling and to treat pain. This medicine may be used for dental pain, headache, or painful monthly periods. It is also used for painful joint and muscular problems such as arthritis, tendinitis, bursitis, and gout.  How should I use this medicine?  Take this medicine by mouth with a  glass of water. Follow the directions on the prescription label. Take it with food if your stomach gets upset. Try to not lie down for at least 10 minutes after you take it. Take your medicine at regular intervals. Do not take your medicine more often than directed. Long-term, continuous use may increase the risk of heart attack or stroke.  A special MedGuide will be given to you by the pharmacist with each prescription and refill. Be sure to read this information carefully each time.  Talk to your pediatrician regarding the use of this medicine in children. Special care may be needed.  What side effects may I notice from receiving this medicine?  Side effects that you should report to your doctor or health care professional as soon as possible:  · black or bloody stools, blood in the urine or vomit  · blurred vision  · chest pain  · difficulty breathing or wheezing  · nausea or vomiting  · severe stomach pain  · skin rash, skin redness, blistering or peeling skin, hives, or itching  · slurred speech or weakness on one side of the body  · swelling of eyelids, throat, lips  · unexplained weight gain or swelling  · unusually weak or tired  · yellowing of eyes or skin  Side effects that usually do not require medical attention (report to your doctor or health care professional if they continue or are bothersome):  · constipation  · headache  · heartburn  What may interact with this medicine?  · alcohol  · aspirin  · cidofovir  · diuretics  · lithium  · methotrexate  · other drugs for inflammation like ketorolac or prednisone  · pemetrexed  · probenecid  · warfarin  What if I miss a dose?  If you miss a dose, take it as soon as you can. If it is almost time for your next dose, take only that dose. Do not take double or extra doses.  Where should I keep my medicine?  Keep out of the reach of children.  Store at room temperature between 15 and 30 degrees C (59 and 86 degrees F). Keep container tightly closed. Throw away  any unused medicine after the expiration date.  What should I tell my health care provider before I take this medicine?  They need to know if you have any of these conditions:  · asthma  · cigarette smoker  · drink more than 3 alcohol containing drinks a day  · heart disease or circulation problems such as heart failure or leg edema (fluid retention)  · high blood pressure  · kidney disease  · liver disease  · stomach bleeding or ulcers  · an unusual or allergic reaction to naproxen, aspirin, other NSAIDs, other medicines, foods, dyes, or preservatives  · pregnant or trying to get pregnant  · breast-feeding  What should I watch for while using this medicine?  Tell your doctor or health care professional if your pain does not get better. Talk to your doctor before taking another medicine for pain. Do not treat yourself.  This medicine does not prevent heart attack or stroke. In fact, this medicine may increase the chance of a heart attack or stroke. The chance may increase with longer use of this medicine and in people who have heart disease. If you take aspirin to prevent heart attack or stroke, talk with your doctor or health care professional.  Do not take other medicines that contain aspirin, ibuprofen, or naproxen with this medicine. Side effects such as stomach upset, nausea, or ulcers may be more likely to occur. Many medicines available without a prescription should not be taken with this medicine.  This medicine can cause ulcers and bleeding in the stomach and intestines at any time during treatment. Do not smoke cigarettes or drink alcohol. These increase irritation to your stomach and can make it more susceptible to damage from this medicine. Ulcers and bleeding can happen without warning symptoms and can cause death.  You may get drowsy or dizzy. Do not drive, use machinery, or do anything that needs mental alertness until you know how this medicine affects you. Do not stand or sit up quickly, especially  if you are an older patient. This reduces the risk of dizzy or fainting spells.  This medicine can cause you to bleed more easily. Try to avoid damage to your teeth and gums when you brush or floss your teeth.  NOTE:This sheet is a summary. It may not cover all possible information. If you have questions about this medicine, talk to your doctor, pharmacist, or health care provider. Copyright© 2017 [employer]

## 2019-06-12 DIAGNOSIS — N39.46 MIXED STRESS AND URGE URINARY INCONTINENCE: ICD-10-CM

## 2019-06-12 DIAGNOSIS — K21.9 GASTROESOPHAGEAL REFLUX DISEASE WITHOUT ESOPHAGITIS: ICD-10-CM

## 2019-06-12 DIAGNOSIS — E78.2 MIXED HYPERLIPIDEMIA: ICD-10-CM

## 2019-06-12 DIAGNOSIS — G47.00 INSOMNIA, UNSPECIFIED TYPE: ICD-10-CM

## 2019-06-12 DIAGNOSIS — E11.69 HYPERLIPIDEMIA ASSOCIATED WITH TYPE 2 DIABETES MELLITUS: ICD-10-CM

## 2019-06-12 DIAGNOSIS — E78.5 HYPERLIPIDEMIA ASSOCIATED WITH TYPE 2 DIABETES MELLITUS: ICD-10-CM

## 2019-06-12 NOTE — TELEPHONE ENCOUNTER
----- Message from Farnaz Monson sent at 6/12/2019 10:14 AM CDT -----  Contact: pt  .Type:  RX Refill Request    Who Called:  pt  Refill or New Rx:refill   RX Name and Strength: [id number] mg //oxybutynin 5 mg  (pt stated she's out of all medication s )  How is the patient currently taking it? (ex. 1XDay):   Is this a 30 day or 90 day RX:   Preferred Pharmacy with phone number:     [facility] - [place], [place] - [address]  [address] [facility] [address]  Phone: [phone] Fax: [phone]      Local or Mail Order: mail   Ordering Provider: Nilsa   Would the patient rather a call back or a response via [facility]?  Call back   Best Call Back Number: [phone]  Additional Information:

## 2019-06-14 RX ORDER — TRAZODONE HYDROCHLORIDE 50 MG/1
50 TABLET ORAL NIGHTLY
Qty: 90 TABLET | Refills: 3 | Status: SHIPPED | OUTPATIENT
Start: 2019-06-14 | End: 2020-04-07

## 2019-06-14 RX ORDER — METFORMIN HYDROCHLORIDE 500 MG/1
TABLET, EXTENDED RELEASE ORAL
Qty: 360 TABLET | Refills: 3 | Status: SHIPPED | OUTPATIENT
Start: 2019-06-14 | End: 2020-04-20

## 2019-06-14 RX ORDER — FAMOTIDINE 40 MG/1
40 TABLET, FILM COATED ORAL DAILY
Qty: 90 TABLET | Refills: 3 | Status: SHIPPED | OUTPATIENT
Start: 2019-06-14 | End: 2020-04-07

## 2019-06-14 RX ORDER — PRAVASTATIN SODIUM 40 MG/1
40 TABLET ORAL DAILY
Qty: 90 TABLET | Refills: 3 | Status: SHIPPED | OUTPATIENT
Start: 2019-06-14 | End: 2020-04-07

## 2019-06-14 RX ORDER — OXYBUTYNIN CHLORIDE 5 MG/1
5 TABLET ORAL 2 TIMES DAILY
Qty: 180 TABLET | Refills: 1 | Status: SHIPPED | OUTPATIENT
Start: 2019-06-14 | End: 2019-11-05 | Stop reason: SDUPTHER

## 2019-09-20 ENCOUNTER — OFFICE VISIT (OUTPATIENT)
Dept: INTERNAL MEDICINE | Facility: CLINIC | Age: 74
End: 2019-09-20
Payer: MEDICARE

## 2019-09-20 VITALS
SYSTOLIC BLOOD PRESSURE: 131 MMHG | OXYGEN SATURATION: 96 % | BODY MASS INDEX: 23.51 KG/M2 | TEMPERATURE: 98 F | HEIGHT: 67 IN | DIASTOLIC BLOOD PRESSURE: 78 MMHG | HEART RATE: 61 BPM | WEIGHT: 149.81 LBS

## 2019-09-20 DIAGNOSIS — E78.5 HYPERLIPIDEMIA ASSOCIATED WITH TYPE 2 DIABETES MELLITUS: ICD-10-CM

## 2019-09-20 DIAGNOSIS — E11.69 HYPERLIPIDEMIA ASSOCIATED WITH TYPE 2 DIABETES MELLITUS: ICD-10-CM

## 2019-09-20 DIAGNOSIS — F43.0 STRESS REACTION: ICD-10-CM

## 2019-09-20 DIAGNOSIS — Z12.31 ENCOUNTER FOR SCREENING MAMMOGRAM FOR BREAST CANCER: ICD-10-CM

## 2019-09-20 DIAGNOSIS — E11.8 CONTROLLED TYPE 2 DIABETES MELLITUS WITH COMPLICATION, WITHOUT LONG-TERM CURRENT USE OF INSULIN: Primary | ICD-10-CM

## 2019-09-20 DIAGNOSIS — Z23 IMMUNIZATION DUE: ICD-10-CM

## 2019-09-20 PROCEDURE — 99214 OFFICE O/P EST MOD 30 MIN: CPT | Mod: HCNC,25,S$GLB, | Performed by: FAMILY MEDICINE

## 2019-09-20 PROCEDURE — 80061 LIPID PANEL: CPT | Mod: HCNC

## 2019-09-20 PROCEDURE — 3075F PR MOST RECENT SYSTOLIC BLOOD PRESS GE 130-139MM HG: ICD-10-PCS | Mod: HCNC,CPTII,S$GLB, | Performed by: FAMILY MEDICINE

## 2019-09-20 PROCEDURE — 90662 FLU VACCINE - HIGH DOSE (65+) PRESERVATIVE FREE IM: ICD-10-PCS | Mod: HCNC,S$GLB,, | Performed by: FAMILY MEDICINE

## 2019-09-20 PROCEDURE — G0008 FLU VACCINE - HIGH DOSE (65+) PRESERVATIVE FREE IM: ICD-10-PCS | Mod: HCNC,S$GLB,, | Performed by: FAMILY MEDICINE

## 2019-09-20 PROCEDURE — G0008 ADMIN INFLUENZA VIRUS VAC: HCPCS | Mod: HCNC,S$GLB,, | Performed by: FAMILY MEDICINE

## 2019-09-20 PROCEDURE — 99999 PR PBB SHADOW E&M-EST. PATIENT-LVL III: ICD-10-PCS | Mod: PBBFAC,HCNC,, | Performed by: FAMILY MEDICINE

## 2019-09-20 PROCEDURE — 3078F DIAST BP <80 MM HG: CPT | Mod: HCNC,CPTII,S$GLB, | Performed by: FAMILY MEDICINE

## 2019-09-20 PROCEDURE — 90662 IIV NO PRSV INCREASED AG IM: CPT | Mod: HCNC,S$GLB,, | Performed by: FAMILY MEDICINE

## 2019-09-20 PROCEDURE — 3075F SYST BP GE 130 - 139MM HG: CPT | Mod: HCNC,CPTII,S$GLB, | Performed by: FAMILY MEDICINE

## 2019-09-20 PROCEDURE — 80048 BASIC METABOLIC PNL TOTAL CA: CPT | Mod: HCNC

## 2019-09-20 PROCEDURE — 99214 PR OFFICE/OUTPT VISIT, EST, LEVL IV, 30-39 MIN: ICD-10-PCS | Mod: HCNC,25,S$GLB, | Performed by: FAMILY MEDICINE

## 2019-09-20 PROCEDURE — 99499 RISK ADDL DX/OHS AUDIT: ICD-10-PCS | Mod: HCNC,S$GLB,, | Performed by: FAMILY MEDICINE

## 2019-09-20 PROCEDURE — 3044F HG A1C LEVEL LT 7.0%: CPT | Mod: HCNC,CPTII,S$GLB, | Performed by: FAMILY MEDICINE

## 2019-09-20 PROCEDURE — 1101F PT FALLS ASSESS-DOCD LE1/YR: CPT | Mod: HCNC,CPTII,S$GLB, | Performed by: FAMILY MEDICINE

## 2019-09-20 PROCEDURE — 3078F PR MOST RECENT DIASTOLIC BLOOD PRESSURE < 80 MM HG: ICD-10-PCS | Mod: HCNC,CPTII,S$GLB, | Performed by: FAMILY MEDICINE

## 2019-09-20 PROCEDURE — 99499 UNLISTED E&M SERVICE: CPT | Mod: HCNC,S$GLB,, | Performed by: FAMILY MEDICINE

## 2019-09-20 PROCEDURE — 3044F PR MOST RECENT HEMOGLOBIN A1C LEVEL <7.0%: ICD-10-PCS | Mod: HCNC,CPTII,S$GLB, | Performed by: FAMILY MEDICINE

## 2019-09-20 PROCEDURE — 83036 HEMOGLOBIN GLYCOSYLATED A1C: CPT | Mod: HCNC

## 2019-09-20 PROCEDURE — 99999 PR PBB SHADOW E&M-EST. PATIENT-LVL III: CPT | Mod: PBBFAC,HCNC,, | Performed by: FAMILY MEDICINE

## 2019-09-20 PROCEDURE — 1101F PR PT FALLS ASSESS DOC 0-1 FALLS W/OUT INJ PAST YR: ICD-10-PCS | Mod: HCNC,CPTII,S$GLB, | Performed by: FAMILY MEDICINE

## 2019-09-20 NOTE — PROGRESS NOTES
Subjective:       Patient ID: Connie Fields is a 74 y.o. female.    Chief Complaint: Med Change Follow Up    Patient with type 2 diabetes, hyperlipidemia here for scheduled 6 mo recheck with no recent labs. Ordered labwork not yet obtained.   Lab Results       Component                Value               Date                       WBC                      6.78                03/14/2019                 HGB                      14.2                03/14/2019                 HCT                      42.8                03/14/2019                 PLT                      259                 03/14/2019                 CHOL                     114 (L)             09/12/2018                 TRIG                     87                  09/12/2018                 HDL                      47                  09/12/2018                 LDLCALC                  49.6 (L)            09/12/2018                 ALT                      18                  03/14/2019                 AST                      19                  03/14/2019                 NA                       140                 03/14/2019                 K                        4.2                 03/14/2019                 CL                       104                 03/14/2019                 CALCIUM                  9.7                 03/14/2019                 CREATININE               0.7                 03/14/2019                 BUN                      16                  03/14/2019                 CO2                      29                  03/14/2019                 GLU                      114 (H)             03/14/2019                 ESTGFRAFRICA             >60.0               03/14/2019                 EGFRNONAA                >60.0               03/14/2019                 HGBA1C                   5.8 (H)             03/14/2019                 MICALBCREAT              14.6                03/14/2019        Taking 3 metformin daily -   Lab  Results       Component                Value               Date                       HGBA1C                   5.8 (H)             03/14/2019                 HGBA1C                   5.6                 09/12/2018                 HGBA1C                   5.6                 03/15/2018                 HGBA1C                   5.6                 08/10/2017                 HGBA1C                   5.7                 02/15/2017                 HGBA1C                   6.1                 07/29/2016            No HTN. microalb nl - highest was 30.1 two years ago.   Lipids excellent on pravastatin 40.           Diabetes   She presents for her follow-up diabetic visit. She has type 2 diabetes mellitus. Hypoglycemia symptoms include nervousness/anxiousness (relieved with meds) and tremors. Associated symptoms include foot paresthesias (seldom). Pertinent negatives for diabetes include no chest pain. There are no hypoglycemic complications. Symptoms are stable. Diabetic complications include peripheral neuropathy. Pertinent negatives for diabetic complications include no nephropathy or retinopathy. Current diabetic treatment includes oral agent (monotherapy). She is compliant with treatment all of the time. Her weight is stable (down 5-6 lbs since last visit with me). Meal planning includes avoidance of concentrated sweets. Exercise: walks at [facility] 1.5 miles 3 x week - takes 30 min. (Rarely checks - just if not feeling right - no higher than 120 on her checks) An ACE inhibitor/angiotensin II receptor blocker is not being taken. She does not see a podiatrist (had ronny Somers in past).Eye exam is current.     Review of Systems   Constitutional: Negative for unexpected weight change.   HENT: Negative for dental problem (sees dentist 2 x yr), rhinorrhea and sneezing.    Respiratory: Negative for chest tightness and shortness of breath.    Cardiovascular: Negative for chest pain and leg swelling.    Gastrointestinal: Negative for abdominal pain, constipation, diarrhea and nausea.   Genitourinary: Negative for difficulty urinating, dysuria, frequency and vaginal discharge.   Musculoskeletal: Positive for arthralgias (oca knee popping - painful), neck pain (improved - much better now) and neck stiffness. Negative for back pain.   Neurological: Positive for tremors.   Psychiatric/Behavioral: Positive for sleep disturbance (resolved with trazodone). Dysphoric mood: relieved with meds/exercise. The patient is nervous/anxious (relieved with meds).        Objective:      Physical Exam   Constitutional: She is oriented to person, place, and time. She appears well-developed and well-nourished.   HENT:   Head: Normocephalic and atraumatic.   Right Ear: External ear normal.   Left Ear: External ear normal.   Mouth/Throat: Oropharynx is clear and moist. No oropharyngeal exudate.   Neck: Normal range of motion. Neck supple.   Cardiovascular: Normal rate, regular rhythm and normal heart sounds.   Pulses:       Dorsalis pedis pulses are 2+ on the right side, and 2+ on the left side.        Posterior tibial pulses are 1+ on the right side, and 1+ on the left side.   Pulmonary/Chest: Effort normal and breath sounds normal.   Abdominal: Soft. Bowel sounds are normal. She exhibits no distension. There is no tenderness.   Musculoskeletal:        Right foot: There is normal range of motion and no deformity.        Left foot: There is normal range of motion and no deformity.   Feet:   Right Foot:   Protective Sensation: 10 sites tested. 10 sites sensed.   Skin Integrity: Negative for ulcer or skin breakdown.   Left Foot:   Protective Sensation: 10 sites tested. 10 sites sensed.   Skin Integrity: Negative for ulcer or skin breakdown.   Neurological: She is alert and oriented to person, place, and time.   Skin: Skin is warm and dry.   Thickened callus R medial first MTP   Psychiatric: She has a normal mood and affect. Her behavior  is normal.         Assessment/Plan:     1. Controlled type 2 diabetes mellitus with complication, without long-term current use of insulin  Hemoglobin A1c    Microalbumin/creatinine urine ratio    Comprehensive metabolic panel    Basic metabolic panel    Hemoglobin A1c   2. Hyperlipidemia associated with type 2 diabetes mellitus  Lipid panel   3. Stress reaction     4. Immunization due  Influenza - High Dose (65+) (PF) (IM)   5. Encounter for screening mammogram for breast cancer  Mammo Digital Screening Bilat     Labs now and in 6 months - cont current wellbutrin/paroxetine - exercise - all contributing to improved mood/anxiety.

## 2019-09-20 NOTE — PATIENT INSTRUCTIONS
"Talk to your pharmacy and/or your insurance about the "Shingrix" shingles vaccine (two-shot series). Wait to get this mid-October.    Also check with insurance about the cost of tetanus vaccine - either the TdaP (includes protection against pertussis - whooping cough) or the Td.  "

## 2019-09-21 LAB
ANION GAP SERPL CALC-SCNC: 7 MMOL/L (ref 8–16)
BUN SERPL-MCNC: 16 MG/DL (ref 8–23)
CALCIUM SERPL-MCNC: 10.2 MG/DL (ref 8.7–10.5)
CHLORIDE SERPL-SCNC: 106 MMOL/L (ref 95–110)
CHOLEST SERPL-MCNC: 151 MG/DL (ref 120–199)
CHOLEST/HDLC SERPL: 2.6 {RATIO} (ref 2–5)
CO2 SERPL-SCNC: 29 MMOL/L (ref 23–29)
CREAT SERPL-MCNC: 0.7 MG/DL (ref 0.5–1.4)
EST. GFR  (AFRICAN AMERICAN): >60 ML/MIN/1.73 M^2
EST. GFR  (NON AFRICAN AMERICAN): >60 ML/MIN/1.73 M^2
ESTIMATED AVG GLUCOSE: 117 MG/DL (ref 68–131)
GLUCOSE SERPL-MCNC: 105 MG/DL (ref 70–110)
HBA1C MFR BLD HPLC: 5.7 % (ref 4–5.6)
HDLC SERPL-MCNC: 58 MG/DL (ref 40–75)
HDLC SERPL: 38.4 % (ref 20–50)
LDLC SERPL CALC-MCNC: 74.8 MG/DL (ref 63–159)
NONHDLC SERPL-MCNC: 93 MG/DL
POTASSIUM SERPL-SCNC: 4.9 MMOL/L (ref 3.5–5.1)
SODIUM SERPL-SCNC: 142 MMOL/L (ref 136–145)
TRIGL SERPL-MCNC: 91 MG/DL (ref 30–150)

## 2019-10-30 DIAGNOSIS — F43.0 STRESS REACTION: ICD-10-CM

## 2019-10-30 RX ORDER — BUPROPION HYDROCHLORIDE 150 MG/1
150 TABLET ORAL DAILY
Qty: 90 TABLET | Refills: 3 | Status: SHIPPED | OUTPATIENT
Start: 2019-10-30 | End: 2021-02-22 | Stop reason: SDUPTHER

## 2019-11-05 DIAGNOSIS — N39.46 MIXED STRESS AND URGE URINARY INCONTINENCE: ICD-10-CM

## 2019-11-05 RX ORDER — OXYBUTYNIN CHLORIDE 5 MG/1
TABLET ORAL
Qty: 180 TABLET | Refills: 3 | Status: SHIPPED | OUTPATIENT
Start: 2019-11-05 | End: 2020-09-30

## 2019-12-10 ENCOUNTER — HOSPITAL ENCOUNTER (OUTPATIENT)
Dept: RADIOLOGY | Facility: HOSPITAL | Age: 74
Discharge: HOME | End: 2019-12-10
Attending: FAMILY MEDICINE
Payer: MEDICARE

## 2019-12-10 ENCOUNTER — OFFICE VISIT (OUTPATIENT)
Dept: OPHTHALMOLOGY | Facility: CLINIC | Age: 74
End: 2019-12-10
Payer: COMMERCIAL

## 2019-12-10 VITALS — WEIGHT: 149.69 LBS | HEIGHT: 67 IN | BODY MASS INDEX: 23.49 KG/M2

## 2019-12-10 DIAGNOSIS — Z12.31 ENCOUNTER FOR SCREENING MAMMOGRAM FOR BREAST CANCER: ICD-10-CM

## 2019-12-10 DIAGNOSIS — E11.9 DIABETES MELLITUS TYPE 2 WITHOUT RETINOPATHY: Primary | ICD-10-CM

## 2019-12-10 DIAGNOSIS — H52.4 BILATERAL PRESBYOPIA: ICD-10-CM

## 2019-12-10 DIAGNOSIS — Z96.1 PSEUDOPHAKIA OF BOTH EYES: ICD-10-CM

## 2019-12-10 PROCEDURE — 99499 RISK ADDL DX/OHS AUDIT: ICD-10-PCS | Mod: S$GLB,,, | Performed by: OPTOMETRIST

## 2019-12-10 PROCEDURE — 77063 BREAST TOMOSYNTHESIS BI: CPT | Mod: 26,HCNC,, | Performed by: RADIOLOGY

## 2019-12-10 PROCEDURE — 92014 COMPRE OPH EXAM EST PT 1/>: CPT | Mod: HCNC,S$GLB,, | Performed by: OPTOMETRIST

## 2019-12-10 PROCEDURE — 99999 PR PBB SHADOW E&M-EST. PATIENT-LVL I: CPT | Mod: PBBFAC,HCNC,, | Performed by: OPTOMETRIST

## 2019-12-10 PROCEDURE — 77067 SCR MAMMO BI INCL CAD: CPT | Mod: TC,HCNC

## 2019-12-10 PROCEDURE — 77067 SCR MAMMO BI INCL CAD: CPT | Mod: 26,HCNC,, | Performed by: RADIOLOGY

## 2019-12-10 PROCEDURE — 77063 MAMMO DIGITAL SCREENING BILAT WITH TOMOSYNTHESIS_CAD: ICD-10-PCS | Mod: 26,HCNC,, | Performed by: RADIOLOGY

## 2019-12-10 PROCEDURE — 99999 PR PBB SHADOW E&M-EST. PATIENT-LVL I: ICD-10-PCS | Mod: PBBFAC,HCNC,, | Performed by: OPTOMETRIST

## 2019-12-10 PROCEDURE — 92014 PR EYE EXAM, EST PATIENT,COMPREHESV: ICD-10-PCS | Mod: HCNC,S$GLB,, | Performed by: OPTOMETRIST

## 2019-12-10 PROCEDURE — 92015 PR REFRACTION: ICD-10-PCS | Mod: HCNC,S$GLB,, | Performed by: OPTOMETRIST

## 2019-12-10 PROCEDURE — 77067 MAMMO DIGITAL SCREENING BILAT WITH TOMOSYNTHESIS_CAD: ICD-10-PCS | Mod: 26,HCNC,, | Performed by: RADIOLOGY

## 2019-12-10 PROCEDURE — 99499 UNLISTED E&M SERVICE: CPT | Mod: S$GLB,,, | Performed by: OPTOMETRIST

## 2019-12-10 PROCEDURE — 92015 DETERMINE REFRACTIVE STATE: CPT | Mod: HCNC,S$GLB,, | Performed by: OPTOMETRIST

## 2019-12-10 NOTE — PROGRESS NOTES
HPI     NIDDM exam.  Patient didn't make the 5 month appointment after cat SX.  Sees better at near than at a distance.  Update glasses RX.  Last eye visit 01/15/2019 [facility].  Last eye visit with [facility] 02/15/2019.    Last edited by Eliz Gaming on 12/10/2019 10:41 AM. (History)            Assessment /Plan     For exam results, see Encounter Report.    Diabetes mellitus type 2 without retinopathy    Pseudophakia of both eyes    Bilateral presbyopia      No Background Diabetic Retinopathy    Stable IOL OU.    Dispense Final Rx for glasses.  RTC 1 year  Discussed above and answered questions.

## 2020-03-10 DIAGNOSIS — F41.9 ANXIETY: ICD-10-CM

## 2020-03-10 RX ORDER — PAROXETINE 30 MG/1
30 TABLET, FILM COATED ORAL EVERY MORNING
Qty: 90 TABLET | Refills: 3 | Status: CANCELLED | OUTPATIENT
Start: 2020-03-10

## 2020-03-10 NOTE — TELEPHONE ENCOUNTER
----- Message from Farnaz Monson sent at 3/10/2020 11:07 AM CDT -----  Contact: pt   ..Type:  RX Refill Request    Who Called:  pt  Refill or New Rx: refill   RX Name and Strength: paxel acl 30 mg   How is the patient currently taking it? (ex. 1XDay):   Is this a 30 day or 90 day RX: refill humana mail delivery  request for 30 days  Preferred Pharmacy with phone number:      [facility]/pharmacy #[id number] - [place] - [address] [facility]  [address]  [place] [address]  Phone: [phone] Fax: [phone]      Local or Mail Order:local   Ordering Provider:   Would the patient rather a call back or a response via [facility]? Call back  Best Call Back Number: [phone] (home) [phone] (work)    Additional Information: pt having withdraws / pt needs a partial refill until scheduled appt

## 2020-03-10 NOTE — TELEPHONE ENCOUNTER
----- Message from Marla Erwin sent at 3/10/2020  3:16 PM CDT -----  Contact: Patient   Connie would like a call back at [phone], Regards to her refill status for Paroxetine. She is out of the medication and need just enough called in to her local pharmacy until her mail order comes in.    [facility]/pharmacy #[id number] - [place] - [address] AT [facility]  [address] [place] [place] [address]  Phone: [phone] Fax: [phone]    Thanks  Td

## 2020-03-10 NOTE — TELEPHONE ENCOUNTER
Spoke with the patient asking a refill of Paroxetine 30 mg to send it in to her local pharmacy mail order arrive. Patient state she is desperately need it now. Last OV 09/20/19. Please advise.

## 2020-03-10 NOTE — TELEPHONE ENCOUNTER
----- Message from Marla Erwin sent at 3/10/2020  3:16 PM CDT -----  Contact: Patient   Connie would like a call back at [phone], Regards to her refill status for Paroxetine. She is out of the medication and need just enough called in to her local pharmacy until her mail order comes in.    [facility]/pharmacy #[id number] - [place] - [address] AT [facility]  [address] [place] [place] [address]  Phone: [phone] Fax: [phone]    Thanks  Td

## 2020-03-11 DIAGNOSIS — F41.9 ANXIETY: ICD-10-CM

## 2020-03-11 RX ORDER — PAROXETINE 30 MG/1
30 TABLET, FILM COATED ORAL EVERY MORNING
Qty: 30 TABLET | Refills: 0 | Status: SHIPPED | OUTPATIENT
Start: 2020-03-11 | End: 2020-08-21 | Stop reason: SDUPTHER

## 2020-03-11 RX ORDER — PAROXETINE 30 MG/1
30 TABLET, FILM COATED ORAL EVERY MORNING
Qty: 90 TABLET | Refills: 4 | Status: SHIPPED | OUTPATIENT
Start: 2020-03-11 | End: 2021-02-22 | Stop reason: SDUPTHER

## 2020-03-11 NOTE — TELEPHONE ENCOUNTER
Patient state she receive the rx refill from [facility] and she's asking to send refill of PAROXETINE 30 mg to her mail order [facility] - [place], [place] - [address]. Please advise.

## 2020-03-11 NOTE — TELEPHONE ENCOUNTER
----- Message from Peyman Pardo sent at 3/11/2020 10:32 AM CDT -----  Contact: Pt   Pt called in regards to having side effects due to not having medication. Pt stated that she tried to  the medication when she was told that the script needs approval. Pt can be reached at [phone] (home) [phone] (work)    [facility]/pharmacy #[id number] - [place] - [address] AT [facility]  [address] [place] [address]  Phone: [phone] Fax: [phone]     paroxetine (PAXIL) 30 MG tablet is the name of medication.

## 2020-03-16 ENCOUNTER — LAB VISIT (OUTPATIENT)
Dept: LAB | Facility: HOSPITAL | Age: 75
End: 2020-03-16
Attending: FAMILY MEDICINE
Payer: MEDICARE

## 2020-03-16 DIAGNOSIS — E11.8 CONTROLLED TYPE 2 DIABETES MELLITUS WITH COMPLICATION, WITHOUT LONG-TERM CURRENT USE OF INSULIN: ICD-10-CM

## 2020-03-16 LAB
ALBUMIN SERPL BCP-MCNC: 4.1 G/DL (ref 3.5–5.2)
ALP SERPL-CCNC: 57 U/L (ref 55–135)
ALT SERPL W/O P-5'-P-CCNC: 19 U/L (ref 10–44)
ANION GAP SERPL CALC-SCNC: 10 MMOL/L (ref 8–16)
AST SERPL-CCNC: 19 U/L (ref 10–40)
BILIRUB SERPL-MCNC: 0.9 MG/DL (ref 0.1–1)
BUN SERPL-MCNC: 12 MG/DL (ref 8–23)
CALCIUM SERPL-MCNC: 9.4 MG/DL (ref 8.7–10.5)
CHLORIDE SERPL-SCNC: 103 MMOL/L (ref 95–110)
CO2 SERPL-SCNC: 28 MMOL/L (ref 23–29)
CREAT SERPL-MCNC: 0.7 MG/DL (ref 0.5–1.4)
EST. GFR  (AFRICAN AMERICAN): >60 ML/MIN/1.73 M^2
EST. GFR  (NON AFRICAN AMERICAN): >60 ML/MIN/1.73 M^2
ESTIMATED AVG GLUCOSE: 117 MG/DL (ref 68–131)
GLUCOSE SERPL-MCNC: 122 MG/DL (ref 70–110)
HBA1C MFR BLD HPLC: 5.7 % (ref 4–5.6)
POTASSIUM SERPL-SCNC: 4.1 MMOL/L (ref 3.5–5.1)
PROT SERPL-MCNC: 6.8 G/DL (ref 6–8.4)
SODIUM SERPL-SCNC: 141 MMOL/L (ref 136–145)

## 2020-03-16 PROCEDURE — 83036 HEMOGLOBIN GLYCOSYLATED A1C: CPT | Mod: HCNC

## 2020-03-16 PROCEDURE — 36415 COLL VENOUS BLD VENIPUNCTURE: CPT | Mod: HCNC

## 2020-03-16 PROCEDURE — 80053 COMPREHEN METABOLIC PANEL: CPT | Mod: HCNC

## 2020-03-18 ENCOUNTER — TELEPHONE (OUTPATIENT)
Dept: INTERNAL MEDICINE | Facility: CLINIC | Age: 75
End: 2020-03-18

## 2020-03-18 DIAGNOSIS — E11.69 HYPERLIPIDEMIA ASSOCIATED WITH TYPE 2 DIABETES MELLITUS: ICD-10-CM

## 2020-03-18 DIAGNOSIS — E78.5 HYPERLIPIDEMIA ASSOCIATED WITH TYPE 2 DIABETES MELLITUS: ICD-10-CM

## 2020-03-18 DIAGNOSIS — E11.8 CONTROLLED TYPE 2 DIABETES MELLITUS WITH COMPLICATION, WITHOUT LONG-TERM CURRENT USE OF INSULIN: Primary | ICD-10-CM

## 2020-03-18 NOTE — TELEPHONE ENCOUNTER
----- Message from Ivana Ash MD sent at 3/18/2020  7:52 AM CDT -----  Regarding: convert to virtual visit if pt wants  See patient lab results.  Recommend she cancel her visit Friday 03/20/2020 or if she wants convert it to a virtual visit.  Please contact the patient to facilitate.  You can also schedule her August labs followed by a visit.

## 2020-04-07 DIAGNOSIS — E11.69 HYPERLIPIDEMIA ASSOCIATED WITH TYPE 2 DIABETES MELLITUS: ICD-10-CM

## 2020-04-07 DIAGNOSIS — E78.5 HYPERLIPIDEMIA ASSOCIATED WITH TYPE 2 DIABETES MELLITUS: ICD-10-CM

## 2020-04-07 DIAGNOSIS — E78.2 MIXED HYPERLIPIDEMIA: ICD-10-CM

## 2020-04-07 DIAGNOSIS — G47.00 INSOMNIA, UNSPECIFIED TYPE: ICD-10-CM

## 2020-04-07 DIAGNOSIS — K21.9 GASTROESOPHAGEAL REFLUX DISEASE WITHOUT ESOPHAGITIS: ICD-10-CM

## 2020-04-07 RX ORDER — FAMOTIDINE 40 MG/1
TABLET, FILM COATED ORAL
Qty: 90 TABLET | Refills: 3 | Status: SHIPPED | OUTPATIENT
Start: 2020-04-07 | End: 2020-08-21

## 2020-04-07 RX ORDER — TRAZODONE HYDROCHLORIDE 50 MG/1
TABLET ORAL
Qty: 90 TABLET | Refills: 3 | Status: SHIPPED | OUTPATIENT
Start: 2020-04-07 | End: 2021-01-09

## 2020-04-07 RX ORDER — PRAVASTATIN SODIUM 40 MG/1
TABLET ORAL
Qty: 90 TABLET | Refills: 3 | Status: SHIPPED | OUTPATIENT
Start: 2020-04-07 | End: 2021-01-09

## 2020-04-20 RX ORDER — METFORMIN HYDROCHLORIDE 500 MG/1
TABLET, EXTENDED RELEASE ORAL
Qty: 360 TABLET | Refills: 3 | Status: SHIPPED | OUTPATIENT
Start: 2020-04-20 | End: 2021-06-03 | Stop reason: SDUPTHER

## 2020-07-07 ENCOUNTER — OFFICE VISIT (OUTPATIENT)
Dept: FAMILY MEDICINE | Facility: CLINIC | Age: 75
End: 2020-07-07
Payer: MEDICARE

## 2020-07-07 VITALS
TEMPERATURE: 97 F | WEIGHT: 143.31 LBS | HEART RATE: 80 BPM | BODY MASS INDEX: 22.49 KG/M2 | OXYGEN SATURATION: 97 % | HEIGHT: 67 IN | SYSTOLIC BLOOD PRESSURE: 130 MMHG | DIASTOLIC BLOOD PRESSURE: 70 MMHG

## 2020-07-07 DIAGNOSIS — E11.9 DIABETES MELLITUS TYPE 2 WITHOUT RETINOPATHY: ICD-10-CM

## 2020-07-07 DIAGNOSIS — E11.8 CONTROLLED TYPE 2 DIABETES MELLITUS WITH COMPLICATION, WITHOUT LONG-TERM CURRENT USE OF INSULIN: ICD-10-CM

## 2020-07-07 DIAGNOSIS — N39.46 MIXED STRESS AND URGE URINARY INCONTINENCE: ICD-10-CM

## 2020-07-07 DIAGNOSIS — F33.1 MAJOR DEPRESSIVE DISORDER, RECURRENT EPISODE, MODERATE: ICD-10-CM

## 2020-07-07 DIAGNOSIS — Z00.00 ENCOUNTER FOR PREVENTIVE HEALTH EXAMINATION: Primary | ICD-10-CM

## 2020-07-07 DIAGNOSIS — E11.69 HYPERLIPIDEMIA ASSOCIATED WITH TYPE 2 DIABETES MELLITUS: ICD-10-CM

## 2020-07-07 DIAGNOSIS — F41.1 GENERALIZED ANXIETY DISORDER: ICD-10-CM

## 2020-07-07 DIAGNOSIS — E78.5 HYPERLIPIDEMIA ASSOCIATED WITH TYPE 2 DIABETES MELLITUS: ICD-10-CM

## 2020-07-07 PROBLEM — H25.013 CORTICAL AGE-RELATED CATARACT OF BOTH EYES: Status: RESOLVED | Noted: 2017-10-10 | Resolved: 2020-07-07

## 2020-07-07 PROCEDURE — 99499 UNLISTED E&M SERVICE: CPT | Mod: HCNC,S$GLB,, | Performed by: NURSE PRACTITIONER

## 2020-07-07 PROCEDURE — 3075F PR MOST RECENT SYSTOLIC BLOOD PRESS GE 130-139MM HG: ICD-10-PCS | Mod: HCNC,CPTII,S$GLB, | Performed by: NURSE PRACTITIONER

## 2020-07-07 PROCEDURE — G0439 PPPS, SUBSEQ VISIT: HCPCS | Mod: HCNC,S$GLB,, | Performed by: NURSE PRACTITIONER

## 2020-07-07 PROCEDURE — 99999 PR PBB SHADOW E&M-EST. PATIENT-LVL IV: ICD-10-PCS | Mod: PBBFAC,HCNC,, | Performed by: NURSE PRACTITIONER

## 2020-07-07 PROCEDURE — G0439 PR MEDICARE ANNUAL WELLNESS SUBSEQUENT VISIT: ICD-10-PCS | Mod: HCNC,S$GLB,, | Performed by: NURSE PRACTITIONER

## 2020-07-07 PROCEDURE — 3078F DIAST BP <80 MM HG: CPT | Mod: HCNC,CPTII,S$GLB, | Performed by: NURSE PRACTITIONER

## 2020-07-07 PROCEDURE — 99499 RISK ADDL DX/OHS AUDIT: ICD-10-PCS | Mod: HCNC,S$GLB,, | Performed by: NURSE PRACTITIONER

## 2020-07-07 PROCEDURE — 3044F PR MOST RECENT HEMOGLOBIN A1C LEVEL <7.0%: ICD-10-PCS | Mod: HCNC,CPTII,S$GLB, | Performed by: NURSE PRACTITIONER

## 2020-07-07 PROCEDURE — 3078F PR MOST RECENT DIASTOLIC BLOOD PRESSURE < 80 MM HG: ICD-10-PCS | Mod: HCNC,CPTII,S$GLB, | Performed by: NURSE PRACTITIONER

## 2020-07-07 PROCEDURE — 3075F SYST BP GE 130 - 139MM HG: CPT | Mod: HCNC,CPTII,S$GLB, | Performed by: NURSE PRACTITIONER

## 2020-07-07 PROCEDURE — 99999 PR PBB SHADOW E&M-EST. PATIENT-LVL IV: CPT | Mod: PBBFAC,HCNC,, | Performed by: NURSE PRACTITIONER

## 2020-07-07 PROCEDURE — 3044F HG A1C LEVEL LT 7.0%: CPT | Mod: HCNC,CPTII,S$GLB, | Performed by: NURSE PRACTITIONER

## 2020-07-07 NOTE — Clinical Note
Your patient was seen today for a HRA visit.  I have included a copy of my visit note, please review the note and feel free to contact me with any questions.   Thank you for allowing me to participate in the care of your patients.   Lesa Gaming NP

## 2020-07-07 NOTE — PATIENT INSTRUCTIONS
Counseling and Referral of Other Preventative  (Italic type indicates deductible and co-insurance are waived)    Patient Name: Connie Fields  Today's Date: 7/7/2020    Health Maintenance       Date Due Completion Date    TETANUS VACCINE 06/30/1963 ---    Shingles Vaccine (2 of 3) 12/26/2017 10/31/2017    Influenza Vaccine (1) 09/01/2020 9/20/2019    Hemoglobin A1c 09/16/2020 3/16/2020    Override on 1/22/2014: Done    Foot Exam 09/20/2020 9/20/2019    Override on 2/18/2015: Done    Override on 1/22/2014: Done    Override on 8/6/2013: Done    Lipid Panel 09/20/2020 9/20/2019    Override on 1/31/2014: Done    DEXA SCAN 10/03/2020 10/3/2017    Override on 7/23/2015: Not Clinically Appropriate (gyn)    Mammogram 12/10/2020 12/10/2019    Override on 7/23/2015: Not Clinically Appropriate (gyn)    Eye Exam 12/10/2020 12/10/2019    Override on 12/10/2019: Done    Override on 1/22/2015: Done    Urine Microalbumin 03/16/2021 3/16/2020    Override on 8/6/2013: Done    Low Dose Statin 07/07/2021 7/7/2020    Colorectal Cancer Screening 12/06/2021 12/6/2016        No orders of the defined types were placed in this encounter.    The following information is provided to all patients.  This information is to help you find resources for any of the problems found today that may be affecting your health:                Living healthy guide: www.[facility].[place].gov      Understanding Diabetes: www.diabetes.org      Eating healthy: www.cdc.gov/healthyweight      CDC home safety checklist: www.cdc.gov/steadi/patient.html      Agency on Aging: www.goea.[place].gov      Alcoholics anonymous (AA): www.aa.org      Physical Activity: www.ramsey.nih.gov/sq1mhyu      Tobacco use: www.quitwithusla.org

## 2020-07-07 NOTE — PROGRESS NOTES
"  Connie Fields presented for a  Medicare AWV and comprehensive Health Risk Assessment today. The following components were reviewed and updated:    · Medical history  · Family History  · Social history  · Allergies and Current Medications  · Health Risk Assessment  · Health Maintenance  · Care Team     ** See Completed Assessments for Annual Wellness Visit within the encounter summary.**       The following assessments were completed:  · Living Situation  · CAGE  · Depression Screening  · Timed Get Up and Go  · Whisper Test  · Cognitive Function Screening  · Nutrition Screening  · ADL Screening  · PAQ Screening    Vitals:    07/07/20 1433   BP: 130/70   Pulse: 80   Temp: 97.3 °F (36.3 °C)   SpO2: 97%   Weight: 65 kg (143 lb 4.8 oz)   Height: 5' 7" (1.702 m)     Body mass index is 22.44 kg/m².       Current Outpatient Medications:     aspirin (ECOTRIN) 81 MG EC tablet, Take 81 mg by mouth once daily., Disp: , Rfl:     blood sugar diagnostic (BLOOD GLUCOSE TEST) Strp, 1 each by Misc.(Non-Drug; Combo Route) route once daily. One touch verio IQ, Disp: 100 each, Rfl: 4    blood-glucose meter kit, Use as instructed, Disp: 1 each, Rfl: 0    buPROPion (WELLBUTRIN XL) 150 MG TB24 tablet, TAKE 1 TABLET (150 MG TOTAL) BY MOUTH ONCE DAILY., Disp: 90 tablet, Rfl: 3    cholecalciferol, vitamin D3, (VITAMIN D3) 1,000 unit capsule, Take 1,000 Units by mouth once daily., Disp: , Rfl:     cyanocobalamin (VITAMIN B-12) 1000 MCG tablet, Take 100 mcg by mouth once daily., Disp: , Rfl:     famotidine (PEPCID) 40 MG tablet, TAKE 1 TABLET EVERY DAY, Disp: 90 tablet, Rfl: 3    fish oil-omega-3 fatty acids 300-1,000 mg capsule, Take 2 g by mouth once daily., Disp: , Rfl:     lancets (ONE TOUCH DELICA LANCETS) 33 gauge Misc, 1 lancet by Misc.(Non-Drug; Combo Route) route 2 (two) times daily., Disp: 200 each, Rfl: 3    lysine 500 mg Tab, Take 500 mg by mouth once daily., Disp: , Rfl:     metFORMIN (GLUCOPHAGE-XR) 500 MG XR 24hr " tablet, TAKE 2 TABLETS TWICE DAILY WITH MEALS, Disp: 360 tablet, Rfl: 3    multivitamin capsule, Take 1 capsule by mouth once daily., Disp: , Rfl:     oxybutynin (DITROPAN) 5 MG Tab, TAKE 1 TABLET TWICE DAILY, Disp: 180 tablet, Rfl: 3    paroxetine (PAXIL) 30 MG tablet, Take 1 tablet (30 mg total) by mouth every morning., Disp: 30 tablet, Rfl: 0    paroxetine (PAXIL) 30 MG tablet, Take 1 tablet (30 mg total) by mouth every morning., Disp: 90 tablet, Rfl: 4    PHENAZOPYRIDINE HCL (AZO ORAL), Take 1 tablet by mouth daily as needed. , Disp: , Rfl:     pravastatin (PRAVACHOL) 40 MG tablet, TAKE 1 TABLET ONE TIME DAILY, Disp: 90 tablet, Rfl: 3    traZODone (DESYREL) 50 MG tablet, TAKE 1 TABLET EVERY EVENING, Disp: 90 tablet, Rfl: 3    Physical Exam  Vitals signs and nursing note reviewed.   Constitutional:       Appearance: Normal appearance. She is well-developed.   HENT:      Head: Normocephalic and atraumatic.   Eyes:      Pupils: Pupils are equal, round, and reactive to light.   Neck:      Vascular: No carotid bruit.   Cardiovascular:      Rate and Rhythm: Normal rate and regular rhythm.      Pulses: Normal pulses.      Heart sounds: Normal heart sounds. No murmur. No gallop.    Pulmonary:      Effort: Pulmonary effort is normal.      Breath sounds: Normal breath sounds.   Abdominal:      General: Bowel sounds are normal. There is no distension.      Palpations: Abdomen is soft.      Tenderness: There is no abdominal tenderness.   Musculoskeletal: Normal range of motion.         General: No tenderness.   Skin:     General: Skin is warm and dry.   Neurological:      Mental Status: She is alert.      Motor: No abnormal muscle tone.      Gait: Gait normal.   Psychiatric:         Speech: Speech normal.         Behavior: Behavior normal.         Thought Content: Thought content normal.         Judgment: Judgment normal.           Diagnoses and health risks identified today and associated  recommendations/orders:    1. Encounter for preventive health examination  Completed     2. Controlled type 2 diabetes mellitus with complication, without long-term current use of insulin  Component      Latest Ref Rng & Units 3/16/2020   Hemoglobin A1C External      4.0 - 5.6 % 5.7 (H)   Estimated Avg Glucose      68 - 131 mg/dL 117   Chronic and Stable on Metformin- diet and exeriscse. Continue current treatment plan as previously prescribed with your PCP    3. Hyperlipidemia associated with type 2 diabetes mellitus  Chronic and Stable on Pravastatin on OMEGA 3 . Continue current treatment plan as previously prescribed with your PCP    4. Major depressive disorder, recurrent episode, moderate  Chronic and Stable on Wellbuturin, Paxil  and Trazdone Continue current treatment plan as previously prescribed with your PCP    5. Generalized anxiety disorder  Chronic and Stable on Wellbuturin, Paxil and Trazdone Continue current treatment plan as previously prescribed with your PCP    6. Mixed stress and urge urinary incontinence  Chronic and Stable on Ditropain. Continue current treatment plan as previously prescribed with your PCP    7. Diabetes mellitus type 2 without retinopathy  Stable. History . Followed by [facility]     I offered to discuss end of life issues, including information on how to make advance directives that the patient could use to name someone who would make medical decisions on their behalf if they became too ill to make themselves.  _X_Patient is interested, I provided paper work and offered to discuss.    Provided Connie with a 5-10 year written screening schedule and personal prevention plan. Recommendations were developed using the USPSTF age appropriate recommendations. Education, counseling, and referrals were provided as needed. After Visit Summary printed and given to patient which includes a list of additional screenings\tests needed.    Follow up in about 1 year (around 7/7/2021), or  annual wellness.    Lesa Gaming NP

## 2020-08-18 ENCOUNTER — LAB VISIT (OUTPATIENT)
Dept: LAB | Facility: HOSPITAL | Age: 75
End: 2020-08-18
Attending: FAMILY MEDICINE
Payer: MEDICARE

## 2020-08-18 DIAGNOSIS — E78.5 HYPERLIPIDEMIA ASSOCIATED WITH TYPE 2 DIABETES MELLITUS: ICD-10-CM

## 2020-08-18 DIAGNOSIS — E11.8 CONTROLLED TYPE 2 DIABETES MELLITUS WITH COMPLICATION, WITHOUT LONG-TERM CURRENT USE OF INSULIN: ICD-10-CM

## 2020-08-18 DIAGNOSIS — E11.69 HYPERLIPIDEMIA ASSOCIATED WITH TYPE 2 DIABETES MELLITUS: ICD-10-CM

## 2020-08-18 LAB
ALT SERPL W/O P-5'-P-CCNC: 18 U/L (ref 10–44)
AST SERPL-CCNC: 22 U/L (ref 10–40)
BASOPHILS # BLD AUTO: 0.04 K/UL (ref 0–0.2)
BASOPHILS NFR BLD: 0.5 % (ref 0–1.9)
CHOLEST SERPL-MCNC: 126 MG/DL (ref 120–199)
CHOLEST/HDLC SERPL: 2.3 {RATIO} (ref 2–5)
DIFFERENTIAL METHOD: NORMAL
EOSINOPHIL # BLD AUTO: 0.1 K/UL (ref 0–0.5)
EOSINOPHIL NFR BLD: 1.3 % (ref 0–8)
ERYTHROCYTE [DISTWIDTH] IN BLOOD BY AUTOMATED COUNT: 13 % (ref 11.5–14.5)
HCT VFR BLD AUTO: 41.5 % (ref 37–48.5)
HDLC SERPL-MCNC: 55 MG/DL (ref 40–75)
HDLC SERPL: 43.7 % (ref 20–50)
HGB BLD-MCNC: 13.4 G/DL (ref 12–16)
IMM GRANULOCYTES # BLD AUTO: 0.02 K/UL (ref 0–0.04)
IMM GRANULOCYTES NFR BLD AUTO: 0.3 % (ref 0–0.5)
LDLC SERPL CALC-MCNC: 56.2 MG/DL (ref 63–159)
LYMPHOCYTES # BLD AUTO: 1.9 K/UL (ref 1–4.8)
LYMPHOCYTES NFR BLD: 24 % (ref 18–48)
MCH RBC QN AUTO: 27.1 PG (ref 27–31)
MCHC RBC AUTO-ENTMCNC: 32.3 G/DL (ref 32–36)
MCV RBC AUTO: 84 FL (ref 82–98)
MONOCYTES # BLD AUTO: 0.6 K/UL (ref 0.3–1)
MONOCYTES NFR BLD: 7 % (ref 4–15)
NEUTROPHILS # BLD AUTO: 5.3 K/UL (ref 1.8–7.7)
NEUTROPHILS NFR BLD: 66.9 % (ref 38–73)
NONHDLC SERPL-MCNC: 71 MG/DL
NRBC BLD-RTO: 0 /100 WBC
PLATELET # BLD AUTO: 278 K/UL (ref 150–350)
PMV BLD AUTO: 9.7 FL (ref 9.2–12.9)
RBC # BLD AUTO: 4.94 M/UL (ref 4–5.4)
TRIGL SERPL-MCNC: 74 MG/DL (ref 30–150)
WBC # BLD AUTO: 7.91 K/UL (ref 3.9–12.7)

## 2020-08-18 PROCEDURE — 85025 COMPLETE CBC W/AUTO DIFF WBC: CPT | Mod: HCNC

## 2020-08-18 PROCEDURE — 80061 LIPID PANEL: CPT | Mod: HCNC

## 2020-08-18 PROCEDURE — 84450 TRANSFERASE (AST) (SGOT): CPT | Mod: HCNC

## 2020-08-18 PROCEDURE — 84460 ALANINE AMINO (ALT) (SGPT): CPT | Mod: HCNC

## 2020-08-18 PROCEDURE — 36415 COLL VENOUS BLD VENIPUNCTURE: CPT | Mod: HCNC

## 2020-08-18 PROCEDURE — 83036 HEMOGLOBIN GLYCOSYLATED A1C: CPT | Mod: HCNC

## 2020-08-19 LAB
ESTIMATED AVG GLUCOSE: 117 MG/DL (ref 68–131)
HBA1C MFR BLD HPLC: 5.7 % (ref 4–5.6)

## 2020-08-21 ENCOUNTER — OFFICE VISIT (OUTPATIENT)
Dept: INTERNAL MEDICINE | Facility: CLINIC | Age: 75
End: 2020-08-21
Payer: MEDICARE

## 2020-08-21 VITALS
HEART RATE: 96 BPM | DIASTOLIC BLOOD PRESSURE: 78 MMHG | WEIGHT: 143.06 LBS | BODY MASS INDEX: 22.45 KG/M2 | HEIGHT: 67 IN | SYSTOLIC BLOOD PRESSURE: 132 MMHG | TEMPERATURE: 98 F | OXYGEN SATURATION: 96 %

## 2020-08-21 DIAGNOSIS — Z12.31 ENCOUNTER FOR SCREENING MAMMOGRAM FOR BREAST CANCER: ICD-10-CM

## 2020-08-21 DIAGNOSIS — E11.69 HYPERLIPIDEMIA ASSOCIATED WITH TYPE 2 DIABETES MELLITUS: ICD-10-CM

## 2020-08-21 DIAGNOSIS — F41.9 ANXIETY AND DEPRESSION: ICD-10-CM

## 2020-08-21 DIAGNOSIS — F43.0 STRESS REACTION: Primary | ICD-10-CM

## 2020-08-21 DIAGNOSIS — E11.8 CONTROLLED TYPE 2 DIABETES MELLITUS WITH COMPLICATION, WITHOUT LONG-TERM CURRENT USE OF INSULIN: ICD-10-CM

## 2020-08-21 DIAGNOSIS — Z78.0 ASYMPTOMATIC MENOPAUSAL STATE: ICD-10-CM

## 2020-08-21 DIAGNOSIS — E78.5 HYPERLIPIDEMIA ASSOCIATED WITH TYPE 2 DIABETES MELLITUS: ICD-10-CM

## 2020-08-21 DIAGNOSIS — F32.A ANXIETY AND DEPRESSION: ICD-10-CM

## 2020-08-21 PROCEDURE — 1101F PR PT FALLS ASSESS DOC 0-1 FALLS W/OUT INJ PAST YR: ICD-10-PCS | Mod: HCNC,CPTII,S$GLB, | Performed by: FAMILY MEDICINE

## 2020-08-21 PROCEDURE — 99999 PR PBB SHADOW E&M-EST. PATIENT-LVL V: ICD-10-PCS | Mod: PBBFAC,HCNC,, | Performed by: FAMILY MEDICINE

## 2020-08-21 PROCEDURE — 99214 PR OFFICE/OUTPT VISIT, EST, LEVL IV, 30-39 MIN: ICD-10-PCS | Mod: HCNC,S$GLB,, | Performed by: FAMILY MEDICINE

## 2020-08-21 PROCEDURE — 99999 PR PBB SHADOW E&M-EST. PATIENT-LVL V: CPT | Mod: PBBFAC,HCNC,, | Performed by: FAMILY MEDICINE

## 2020-08-21 PROCEDURE — 3075F SYST BP GE 130 - 139MM HG: CPT | Mod: HCNC,CPTII,S$GLB, | Performed by: FAMILY MEDICINE

## 2020-08-21 PROCEDURE — 1126F PR PAIN SEVERITY QUANTIFIED, NO PAIN PRESENT: ICD-10-PCS | Mod: HCNC,S$GLB,, | Performed by: FAMILY MEDICINE

## 2020-08-21 PROCEDURE — 3078F DIAST BP <80 MM HG: CPT | Mod: HCNC,CPTII,S$GLB, | Performed by: FAMILY MEDICINE

## 2020-08-21 PROCEDURE — 1126F AMNT PAIN NOTED NONE PRSNT: CPT | Mod: HCNC,S$GLB,, | Performed by: FAMILY MEDICINE

## 2020-08-21 PROCEDURE — 1101F PT FALLS ASSESS-DOCD LE1/YR: CPT | Mod: HCNC,CPTII,S$GLB, | Performed by: FAMILY MEDICINE

## 2020-08-21 PROCEDURE — 3044F HG A1C LEVEL LT 7.0%: CPT | Mod: HCNC,CPTII,S$GLB, | Performed by: FAMILY MEDICINE

## 2020-08-21 PROCEDURE — 1159F PR MEDICATION LIST DOCUMENTED IN MEDICAL RECORD: ICD-10-PCS | Mod: HCNC,S$GLB,, | Performed by: FAMILY MEDICINE

## 2020-08-21 PROCEDURE — 99214 OFFICE O/P EST MOD 30 MIN: CPT | Mod: HCNC,S$GLB,, | Performed by: FAMILY MEDICINE

## 2020-08-21 PROCEDURE — 3078F PR MOST RECENT DIASTOLIC BLOOD PRESSURE < 80 MM HG: ICD-10-PCS | Mod: HCNC,CPTII,S$GLB, | Performed by: FAMILY MEDICINE

## 2020-08-21 PROCEDURE — 3044F PR MOST RECENT HEMOGLOBIN A1C LEVEL <7.0%: ICD-10-PCS | Mod: HCNC,CPTII,S$GLB, | Performed by: FAMILY MEDICINE

## 2020-08-21 PROCEDURE — 3075F PR MOST RECENT SYSTOLIC BLOOD PRESS GE 130-139MM HG: ICD-10-PCS | Mod: HCNC,CPTII,S$GLB, | Performed by: FAMILY MEDICINE

## 2020-08-21 PROCEDURE — 1159F MED LIST DOCD IN RCRD: CPT | Mod: HCNC,S$GLB,, | Performed by: FAMILY MEDICINE

## 2020-08-21 RX ORDER — OMEPRAZOLE 20 MG/1
20 CAPSULE, DELAYED RELEASE ORAL DAILY
COMMUNITY
End: 2023-01-12

## 2020-08-21 RX ORDER — CLONAZEPAM 0.5 MG/1
TABLET ORAL
Qty: 30 TABLET | Refills: 0 | Status: SHIPPED | OUTPATIENT
Start: 2020-08-21 | End: 2020-11-30 | Stop reason: SDUPTHER

## 2020-08-21 NOTE — PATIENT INSTRUCTIONS
Call the [facility] at [phone] for your appt with a counselor. Leave a message on the answering machine.

## 2020-08-21 NOTE — PROGRESS NOTES
Subjective:       Patient ID: Connie Fields is a 75 y.o. female.    Chief Complaint: Follow-up (lab)    Patient with type 2 diabetes, hyperlipidemia, anxiety/depression, OAB, GERD here for scheduled recheck with recent labs.   Note stable A1C over last year on max metformin.  Excellent and improved lipids on current prav 40, increased from 20 last Sep.  She is in the midst of a personal crisis: her youngest son, 39, was taken to hospital yesterday and is unresponsive, unknown substance ingested.  Pt relates her own father committed suicide by GSW to heart when she was in her 30s.  She takes wellbutrin 150 qD, paroxetine 30 qD, trazodone 50 qHS chronically. She is asking for an increase in meds or other help in short term.   Chart review shows she was given clonazepam 1 mg in 2014-15 when this son was facing problems in past. She does not recall this med.  Lab Results       Component                Value               Date                       WBC                      7.91                08/18/2020                 HGB                      13.4                08/18/2020                 HCT                      41.5                08/18/2020                 PLT                      278                 08/18/2020                 CHOL                     126                 08/18/2020                 TRIG                     74                  08/18/2020                 HDL                      55                  08/18/2020                 LDLCALC                  56.2 (L)            08/18/2020                 ALT                      18                  08/18/2020                 AST                      22                  08/18/2020                 NA                       141                 03/16/2020                 K                        4.1                 03/16/2020                 CL                       103                 03/16/2020                 CALCIUM                  9.4                  03/16/2020                 CREATININE               0.7                 03/16/2020                 BUN                      12                  03/16/2020                 CO2                      28                  03/16/2020                 GLU                      122 (H)             03/16/2020                 ESTGFRAFRICA             >60.0               03/16/2020                 EGFRNONAA                >60.0               03/16/2020                 HGBA1C                   5.7 (H)             08/18/2020                 MICALBCREAT              6.0                 03/16/2020            Lab Results       Component                Value               Date                       HGBA1C                   5.7 (H)             08/18/2020                 HGBA1C                   5.7 (H)             03/16/2020                 HGBA1C                   5.7 (H)             09/20/2019                 HGBA1C                   5.8 (H)             03/14/2019                 HGBA1C                   5.6                 09/12/2018                 HGBA1C                   5.6                 03/15/2018            Diabetes Medications        metFORMIN (GLUCOPHAGE-XR) 500 MG XR 24hr tablet TAKE 2 TABLETS TWICE DAILY WITH MEALS      No history HTN:  BP Readings from Last 3 Encounters:  07/07/20 : 130/70  09/20/19 : 131/78  05/29/19 : (!) 140/89  Today: 132/78    Lab Results on pravastatin 40:       Component                Value               Date                       CHOL                     126                 08/18/2020                 CHOL                     151                 09/20/2019                 CHOL                     114 (L)             09/12/2018            Lab Results       Component                Value               Date                       HDL                      55                  08/18/2020                 HDL                      58                  09/20/2019                 HDL                      47                   09/12/2018            Lab Results       Component                Value               Date                       LDLCALC                  56.2 (L)            08/18/2020                 LDLCALC                  74.8                09/20/2019                 LDLCALC                  49.6 (L)            09/12/2018            Lab Results       Component                Value               Date                       TRIG                     74                  08/18/2020                 TRIG                     91                  09/20/2019                 TRIG                     87                  09/12/2018                Review of Systems   Psychiatric/Behavioral: Positive for sleep disturbance (uses trazodone.).       Objective:      Physical Exam  Constitutional:       Appearance: She is well-developed.   HENT:      Head: Normocephalic and atraumatic.   Cardiovascular:      Rate and Rhythm: Normal rate and regular rhythm.      Pulses:           Dorsalis pedis pulses are 2+ on the right side and 2+ on the left side.        Posterior tibial pulses are 1+ on the right side and 1+ on the left side.      Heart sounds: Normal heart sounds.   Pulmonary:      Effort: Pulmonary effort is normal. No respiratory distress.      Breath sounds: Normal breath sounds.   Musculoskeletal:      Right foot: Normal range of motion. No deformity.      Left foot: Normal range of motion. No deformity.   Feet:      Right foot:      Protective Sensation: 10 sites tested. 10 sites sensed.      Skin integrity: No ulcer or skin breakdown.      Left foot:      Protective Sensation: 10 sites tested. 10 sites sensed.      Skin integrity: No ulcer or skin breakdown.   Skin:     General: Skin is warm and dry.      Comments: Thickened R great toe nail, L middle toenail   Neurological:      Mental Status: She is alert and oriented to person, place, and time.   Psychiatric:         Behavior: Behavior normal.           Assessment/Plan:      1. Stress reaction  clonazePAM (KLONOPIN) 0.5 MG tablet    Ambulatory referral/consult to Psychiatry   2. Anxiety and depression     3. Controlled type 2 diabetes mellitus with complication, without long-term current use of insulin  Hemoglobin A1C    Microalbumin/creatinine urine ratio   4. Hyperlipidemia associated with type 2 diabetes mellitus  Comprehensive metabolic panel   5. Asymptomatic menopausal state  DXA Bone Density Spine And Hip   6. Encounter for screening mammogram for breast cancer  Mammo Digital Screening Bilat w/ Lon     Foot exam due/done.  DEXA due October. MMG due 12/11/2020 or later. Schedule together for Dec.  Clonazepam 0.5 rxd - referral for counseling. Recheck 6 weeks, sooner if needed with me.  6 month recheck with labs for DM etc.  Can get BMD/MMG in Dec.

## 2020-09-21 ENCOUNTER — OFFICE VISIT (OUTPATIENT)
Dept: PSYCHIATRY | Facility: CLINIC | Age: 75
End: 2020-09-21
Payer: MEDICARE

## 2020-09-21 DIAGNOSIS — F43.21 COMPLICATED GRIEF: Primary | ICD-10-CM

## 2020-09-21 DIAGNOSIS — F32.A CHRONIC DEPRESSIVE DISORDER: ICD-10-CM

## 2020-09-21 PROCEDURE — 90791 PSYCH DIAGNOSTIC EVALUATION: CPT | Mod: HCNC,S$GLB,, | Performed by: SOCIAL WORKER

## 2020-09-21 PROCEDURE — 90791 PR PSYCHIATRIC DIAGNOSTIC EVALUATION: ICD-10-PCS | Mod: HCNC,S$GLB,, | Performed by: SOCIAL WORKER

## 2020-09-21 PROCEDURE — 99999 PR PBB SHADOW E&M-EST. PATIENT-LVL I: ICD-10-PCS | Mod: PBBFAC,HCNC,, | Performed by: SOCIAL WORKER

## 2020-09-21 PROCEDURE — 99999 PR PBB SHADOW E&M-EST. PATIENT-LVL I: CPT | Mod: PBBFAC,HCNC,, | Performed by: SOCIAL WORKER
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
## 2020-11-30 DIAGNOSIS — F43.0 STRESS REACTION: ICD-10-CM

## 2020-11-30 RX ORDER — CLONAZEPAM 0.5 MG/1
TABLET ORAL
Qty: 30 TABLET | Refills: 0 | Status: SHIPPED | OUTPATIENT
Start: 2020-11-30 | End: 2021-08-25 | Stop reason: SDUPTHER

## 2020-11-30 NOTE — TELEPHONE ENCOUNTER
----- Message from Doreen Kingston sent at 2020  1:23 PM CST -----  Regarding: refill/pt advice  Contact: pt  Pt requests that someone call her about this refill:    clonazePAM (KLONOPIN) 0.5 MG tablet 30 tablet 0 2020  --  Si/2 to 1 up to BID prn severe anxiety  Sent to pharmacy as: clonazePAM (KLONOPIN) 0.5 MG tablet    [id number] - [place] - [address] AT [facility]  Telephone Fax  [phone]    Pt can be reached at [phone]

## 2020-12-14 ENCOUNTER — HOSPITAL ENCOUNTER (OUTPATIENT)
Dept: RADIOLOGY | Facility: HOSPITAL | Age: 75
Discharge: HOME | End: 2020-12-14
Attending: FAMILY MEDICINE
Payer: MEDICARE

## 2020-12-14 DIAGNOSIS — Z12.31 ENCOUNTER FOR SCREENING MAMMOGRAM FOR BREAST CANCER: ICD-10-CM

## 2020-12-14 PROCEDURE — 77067 MAMMO DIGITAL SCREENING BILAT WITH TOMOSYNTHESIS_CAD: ICD-10-PCS | Mod: 26,HCNC,, | Performed by: RADIOLOGY

## 2020-12-14 PROCEDURE — 77067 SCR MAMMO BI INCL CAD: CPT | Mod: 26,HCNC,, | Performed by: RADIOLOGY

## 2020-12-14 PROCEDURE — 77067 SCR MAMMO BI INCL CAD: CPT | Mod: TC,HCNC

## 2020-12-14 PROCEDURE — 77063 BREAST TOMOSYNTHESIS BI: CPT | Mod: 26,HCNC,, | Performed by: RADIOLOGY

## 2020-12-14 PROCEDURE — 77063 MAMMO DIGITAL SCREENING BILAT WITH TOMOSYNTHESIS_CAD: ICD-10-PCS | Mod: 26,HCNC,, | Performed by: RADIOLOGY

## 2021-01-11 ENCOUNTER — IMMUNIZATION (OUTPATIENT)
Dept: INTERNAL MEDICINE | Facility: CLINIC | Age: 76
End: 2021-01-11
Payer: MEDICARE

## 2021-01-11 DIAGNOSIS — Z23 NEED FOR VACCINATION: ICD-10-CM

## 2021-01-11 PROCEDURE — 91300 COVID-19, MRNA, LNP-S, PF, 30 MCG/0.3 ML DOSE VACCINE: CPT | Mod: PBBFAC | Performed by: FAMILY MEDICINE

## 2021-02-01 ENCOUNTER — IMMUNIZATION (OUTPATIENT)
Dept: INTERNAL MEDICINE | Facility: CLINIC | Age: 76
End: 2021-02-01
Payer: MEDICARE

## 2021-02-01 DIAGNOSIS — Z23 NEED FOR VACCINATION: Primary | ICD-10-CM

## 2021-02-01 PROCEDURE — 0002A COVID-19, MRNA, LNP-S, PF, 30 MCG/0.3 ML DOSE VACCINE: CPT | Mod: PBBFAC | Performed by: FAMILY MEDICINE

## 2021-02-01 PROCEDURE — 91300 COVID-19, MRNA, LNP-S, PF, 30 MCG/0.3 ML DOSE VACCINE: CPT | Mod: PBBFAC | Performed by: FAMILY MEDICINE

## 2021-02-09 NOTE — PROGRESS NOTES
"Subjective:       Patient ID: Connie Fields is a 73 y.o. female.    Chief Complaint: Fall (on last thursday); Neck Pain; and Orders (CT scan)    HPI  Sx and onset above  Seen by [facility] and given NSAID injection and rx for steroids and baclofen  Reports sx are improving  Fall occurred after falling on buttocks on bed trying to clean fan  "catapulted" over and hit head and neck on dresser behind bed  Denies LOC  Reports nausea but no vomiting  Has been using cold and warm compresses and taking meds rx from [facility]  Xray at [facility] neg for fractures    Review of Systems   Musculoskeletal: Positive for myalgias, neck pain and neck stiffness. Negative for gait problem.   Skin: Positive for color change.        Objective:   BP (!) 140/89 (BP Location: Left arm, Patient Position: Sitting, BP Method: Medium (Automatic))   Pulse 87   Temp 98 °F (36.7 °C) (Oral)   Ht 5' 5.75" (1.67 m)   Wt 68.4 kg (150 lb 12.7 oz)   SpO2 100%   BMI 24.52 kg/m²     Physical Exam   Constitutional: She is oriented to person, place, and time.   Musculoskeletal:   Neg spurling   Neurological: She is alert and oriented to person, place, and time. No cranial nerve deficit.   Vitals reviewed.    Assessment:     1. Fall, initial encounter    2. Neck pain on left side      Plan:     Problem List Items Addressed This Visit     None      Visit Diagnoses     Fall, initial encounter    -  Primary    Relevant Medications    ketorolac Syrg 15 mg    naproxen (NAPROSYN) 500 MG tablet    Neck pain on left side        Relevant Medications    ketorolac Syrg 15 mg    naproxen (NAPROSYN) 500 MG tablet      H&P reassuring. continue sx tx. Counseled pt regarding NSAID use and to take w/ food to avoid GI upset/ulcers      Follow up if symptoms worsen or fail to improve.  "
Injection given today. Informed to wait 15 minutes in clinic, verbalized understanding.    
10-Feb-2021 06:58:10

## 2021-02-17 ENCOUNTER — LAB VISIT (OUTPATIENT)
Dept: LAB | Facility: HOSPITAL | Age: 76
End: 2021-02-17
Attending: FAMILY MEDICINE
Payer: MEDICARE

## 2021-02-17 DIAGNOSIS — E78.5 HYPERLIPIDEMIA ASSOCIATED WITH TYPE 2 DIABETES MELLITUS: ICD-10-CM

## 2021-02-17 DIAGNOSIS — E11.69 HYPERLIPIDEMIA ASSOCIATED WITH TYPE 2 DIABETES MELLITUS: ICD-10-CM

## 2021-02-17 DIAGNOSIS — E11.8 CONTROLLED TYPE 2 DIABETES MELLITUS WITH COMPLICATION, WITHOUT LONG-TERM CURRENT USE OF INSULIN: ICD-10-CM

## 2021-02-17 LAB
ALBUMIN SERPL BCP-MCNC: 4.2 G/DL (ref 3.5–5.2)
ALP SERPL-CCNC: 53 U/L (ref 55–135)
ALT SERPL W/O P-5'-P-CCNC: 24 U/L (ref 10–44)
ANION GAP SERPL CALC-SCNC: 10 MMOL/L (ref 8–16)
AST SERPL-CCNC: 23 U/L (ref 10–40)
BILIRUB SERPL-MCNC: 1 MG/DL (ref 0.1–1)
BUN SERPL-MCNC: 16 MG/DL (ref 8–23)
CALCIUM SERPL-MCNC: 9 MG/DL (ref 8.7–10.5)
CHLORIDE SERPL-SCNC: 102 MMOL/L (ref 95–110)
CO2 SERPL-SCNC: 28 MMOL/L (ref 23–29)
CREAT SERPL-MCNC: 0.7 MG/DL (ref 0.5–1.4)
EST. GFR  (AFRICAN AMERICAN): >60 ML/MIN/1.73 M^2
EST. GFR  (NON AFRICAN AMERICAN): >60 ML/MIN/1.73 M^2
ESTIMATED AVG GLUCOSE: 105 MG/DL (ref 68–131)
GLUCOSE SERPL-MCNC: 92 MG/DL (ref 70–110)
HBA1C MFR BLD: 5.3 % (ref 4–5.6)
POTASSIUM SERPL-SCNC: 4 MMOL/L (ref 3.5–5.1)
PROT SERPL-MCNC: 6.7 G/DL (ref 6–8.4)
SODIUM SERPL-SCNC: 140 MMOL/L (ref 136–145)

## 2021-02-17 PROCEDURE — 83036 HEMOGLOBIN GLYCOSYLATED A1C: CPT

## 2021-02-17 PROCEDURE — 80053 COMPREHEN METABOLIC PANEL: CPT

## 2021-02-17 PROCEDURE — 36415 COLL VENOUS BLD VENIPUNCTURE: CPT

## 2021-02-22 ENCOUNTER — OFFICE VISIT (OUTPATIENT)
Dept: INTERNAL MEDICINE | Facility: CLINIC | Age: 76
End: 2021-02-22
Payer: MEDICARE

## 2021-02-22 ENCOUNTER — TELEPHONE (OUTPATIENT)
Dept: OPHTHALMOLOGY | Facility: CLINIC | Age: 76
End: 2021-02-22

## 2021-02-22 VITALS
SYSTOLIC BLOOD PRESSURE: 104 MMHG | BODY MASS INDEX: 22.77 KG/M2 | HEART RATE: 78 BPM | OXYGEN SATURATION: 98 % | DIASTOLIC BLOOD PRESSURE: 60 MMHG | TEMPERATURE: 98 F | HEIGHT: 67 IN | WEIGHT: 145.06 LBS

## 2021-02-22 DIAGNOSIS — E11.8 CONTROLLED TYPE 2 DIABETES MELLITUS WITH COMPLICATION, WITHOUT LONG-TERM CURRENT USE OF INSULIN: Primary | ICD-10-CM

## 2021-02-22 DIAGNOSIS — F43.0 STRESS REACTION: ICD-10-CM

## 2021-02-22 DIAGNOSIS — E11.69 HYPERLIPIDEMIA ASSOCIATED WITH TYPE 2 DIABETES MELLITUS: ICD-10-CM

## 2021-02-22 DIAGNOSIS — F41.1 GENERALIZED ANXIETY DISORDER: ICD-10-CM

## 2021-02-22 DIAGNOSIS — F43.21 GRIEVING: ICD-10-CM

## 2021-02-22 DIAGNOSIS — E78.5 HYPERLIPIDEMIA ASSOCIATED WITH TYPE 2 DIABETES MELLITUS: ICD-10-CM

## 2021-02-22 PROCEDURE — 3044F PR MOST RECENT HEMOGLOBIN A1C LEVEL <7.0%: ICD-10-PCS | Mod: CPTII,S$GLB,, | Performed by: FAMILY MEDICINE

## 2021-02-22 PROCEDURE — 3288F PR FALLS RISK ASSESSMENT DOCUMENTED: ICD-10-PCS | Mod: CPTII,S$GLB,, | Performed by: FAMILY MEDICINE

## 2021-02-22 PROCEDURE — 1101F PT FALLS ASSESS-DOCD LE1/YR: CPT | Mod: CPTII,S$GLB,, | Performed by: FAMILY MEDICINE

## 2021-02-22 PROCEDURE — 3044F HG A1C LEVEL LT 7.0%: CPT | Mod: CPTII,S$GLB,, | Performed by: FAMILY MEDICINE

## 2021-02-22 PROCEDURE — 99999 PR PBB SHADOW E&M-EST. PATIENT-LVL IV: ICD-10-PCS | Mod: PBBFAC,,, | Performed by: FAMILY MEDICINE

## 2021-02-22 PROCEDURE — 99214 PR OFFICE/OUTPT VISIT, EST, LEVL IV, 30-39 MIN: ICD-10-PCS | Mod: S$GLB,,, | Performed by: FAMILY MEDICINE

## 2021-02-22 PROCEDURE — 3074F SYST BP LT 130 MM HG: CPT | Mod: CPTII,S$GLB,, | Performed by: FAMILY MEDICINE

## 2021-02-22 PROCEDURE — 3288F FALL RISK ASSESSMENT DOCD: CPT | Mod: CPTII,S$GLB,, | Performed by: FAMILY MEDICINE

## 2021-02-22 PROCEDURE — 1159F PR MEDICATION LIST DOCUMENTED IN MEDICAL RECORD: ICD-10-PCS | Mod: S$GLB,,, | Performed by: FAMILY MEDICINE

## 2021-02-22 PROCEDURE — 1159F MED LIST DOCD IN RCRD: CPT | Mod: S$GLB,,, | Performed by: FAMILY MEDICINE

## 2021-02-22 PROCEDURE — 3078F PR MOST RECENT DIASTOLIC BLOOD PRESSURE < 80 MM HG: ICD-10-PCS | Mod: CPTII,S$GLB,, | Performed by: FAMILY MEDICINE

## 2021-02-22 PROCEDURE — 3078F DIAST BP <80 MM HG: CPT | Mod: CPTII,S$GLB,, | Performed by: FAMILY MEDICINE

## 2021-02-22 PROCEDURE — 99214 OFFICE O/P EST MOD 30 MIN: CPT | Mod: S$GLB,,, | Performed by: FAMILY MEDICINE

## 2021-02-22 PROCEDURE — 99999 PR PBB SHADOW E&M-EST. PATIENT-LVL IV: CPT | Mod: PBBFAC,,, | Performed by: FAMILY MEDICINE

## 2021-02-22 PROCEDURE — 3074F PR MOST RECENT SYSTOLIC BLOOD PRESSURE < 130 MM HG: ICD-10-PCS | Mod: CPTII,S$GLB,, | Performed by: FAMILY MEDICINE

## 2021-02-22 PROCEDURE — 1101F PR PT FALLS ASSESS DOC 0-1 FALLS W/OUT INJ PAST YR: ICD-10-PCS | Mod: CPTII,S$GLB,, | Performed by: FAMILY MEDICINE

## 2021-02-22 RX ORDER — BUPROPION HYDROCHLORIDE 150 MG/1
150 TABLET ORAL DAILY
Qty: 90 TABLET | Refills: 3 | Status: SHIPPED | OUTPATIENT
Start: 2021-02-22 | End: 2023-03-08 | Stop reason: SDUPTHER

## 2021-02-22 RX ORDER — PAROXETINE 30 MG/1
30 TABLET, FILM COATED ORAL EVERY MORNING
Qty: 90 TABLET | Refills: 3 | Status: SHIPPED | OUTPATIENT
Start: 2021-02-22 | End: 2022-01-07 | Stop reason: SDUPTHER

## 2021-03-01 ENCOUNTER — PES CALL (OUTPATIENT)
Dept: ADMINISTRATIVE | Facility: CLINIC | Age: 76
End: 2021-03-01

## 2021-03-01 ENCOUNTER — PATIENT OUTREACH (OUTPATIENT)
Dept: ADMINISTRATIVE | Facility: OTHER | Age: 76
End: 2021-03-01

## 2021-03-01 ENCOUNTER — OFFICE VISIT (OUTPATIENT)
Dept: OPHTHALMOLOGY | Facility: CLINIC | Age: 76
End: 2021-03-01
Payer: MEDICARE

## 2021-03-01 DIAGNOSIS — H52.7 REFRACTIVE DISORDER: ICD-10-CM

## 2021-03-01 DIAGNOSIS — Z96.1 PSEUDOPHAKIA OF BOTH EYES: ICD-10-CM

## 2021-03-01 DIAGNOSIS — E11.9 DIABETES MELLITUS TYPE 2 WITHOUT RETINOPATHY: Primary | ICD-10-CM

## 2021-03-01 PROCEDURE — 3044F PR MOST RECENT HEMOGLOBIN A1C LEVEL <7.0%: ICD-10-PCS | Mod: CPTII,S$GLB,, | Performed by: OPHTHALMOLOGY

## 2021-03-01 PROCEDURE — 99999 PR PBB SHADOW E&M-EST. PATIENT-LVL III: ICD-10-PCS | Mod: PBBFAC,,, | Performed by: OPHTHALMOLOGY

## 2021-03-01 PROCEDURE — 99214 PR OFFICE/OUTPT VISIT, EST, LEVL IV, 30-39 MIN: ICD-10-PCS | Mod: S$GLB,,, | Performed by: OPHTHALMOLOGY

## 2021-03-01 PROCEDURE — 99214 OFFICE O/P EST MOD 30 MIN: CPT | Mod: S$GLB,,, | Performed by: OPHTHALMOLOGY

## 2021-03-01 PROCEDURE — 99999 PR PBB SHADOW E&M-EST. PATIENT-LVL III: CPT | Mod: PBBFAC,,, | Performed by: OPHTHALMOLOGY

## 2021-03-01 PROCEDURE — 3044F HG A1C LEVEL LT 7.0%: CPT | Mod: CPTII,S$GLB,, | Performed by: OPHTHALMOLOGY

## 2021-03-01 PROCEDURE — 1159F MED LIST DOCD IN RCRD: CPT | Mod: S$GLB,,, | Performed by: OPHTHALMOLOGY

## 2021-03-01 PROCEDURE — 1159F PR MEDICATION LIST DOCUMENTED IN MEDICAL RECORD: ICD-10-PCS | Mod: S$GLB,,, | Performed by: OPHTHALMOLOGY

## 2021-03-17 RX ORDER — FAMOTIDINE 40 MG/1
TABLET, FILM COATED ORAL
Qty: 90 TABLET | Refills: 0 | Status: SHIPPED | OUTPATIENT
Start: 2021-03-17 | End: 2021-06-03 | Stop reason: SDUPTHER

## 2021-03-19 ENCOUNTER — OFFICE VISIT (OUTPATIENT)
Dept: INTERNAL MEDICINE | Facility: CLINIC | Age: 76
End: 2021-03-19
Payer: MEDICARE

## 2021-03-19 VITALS
DIASTOLIC BLOOD PRESSURE: 68 MMHG | WEIGHT: 142.44 LBS | BODY MASS INDEX: 23.73 KG/M2 | HEIGHT: 65 IN | OXYGEN SATURATION: 96 % | SYSTOLIC BLOOD PRESSURE: 118 MMHG | HEART RATE: 75 BPM

## 2021-03-19 DIAGNOSIS — E11.69 HYPERLIPIDEMIA ASSOCIATED WITH TYPE 2 DIABETES MELLITUS: ICD-10-CM

## 2021-03-19 DIAGNOSIS — F41.1 GENERALIZED ANXIETY DISORDER: ICD-10-CM

## 2021-03-19 DIAGNOSIS — E78.5 HYPERLIPIDEMIA ASSOCIATED WITH TYPE 2 DIABETES MELLITUS: ICD-10-CM

## 2021-03-19 DIAGNOSIS — Z00.00 ENCOUNTER FOR PREVENTIVE HEALTH EXAMINATION: Primary | ICD-10-CM

## 2021-03-19 DIAGNOSIS — F33.1 MAJOR DEPRESSIVE DISORDER, RECURRENT EPISODE, MODERATE: ICD-10-CM

## 2021-03-19 PROCEDURE — 3078F PR MOST RECENT DIASTOLIC BLOOD PRESSURE < 80 MM HG: ICD-10-PCS | Mod: CPTII,S$GLB,, | Performed by: NURSE PRACTITIONER

## 2021-03-19 PROCEDURE — 99499 UNLISTED E&M SERVICE: CPT | Mod: S$GLB,,, | Performed by: NURSE PRACTITIONER

## 2021-03-19 PROCEDURE — 1126F AMNT PAIN NOTED NONE PRSNT: CPT | Mod: S$GLB,,, | Performed by: NURSE PRACTITIONER

## 2021-03-19 PROCEDURE — 1158F ADVNC CARE PLAN TLK DOCD: CPT | Mod: S$GLB,,, | Performed by: NURSE PRACTITIONER

## 2021-03-19 PROCEDURE — 1126F PR PAIN SEVERITY QUANTIFIED, NO PAIN PRESENT: ICD-10-PCS | Mod: S$GLB,,, | Performed by: NURSE PRACTITIONER

## 2021-03-19 PROCEDURE — G0439 PR MEDICARE ANNUAL WELLNESS SUBSEQUENT VISIT: ICD-10-PCS | Mod: S$GLB,,, | Performed by: NURSE PRACTITIONER

## 2021-03-19 PROCEDURE — 3044F PR MOST RECENT HEMOGLOBIN A1C LEVEL <7.0%: ICD-10-PCS | Mod: CPTII,S$GLB,, | Performed by: NURSE PRACTITIONER

## 2021-03-19 PROCEDURE — 1158F PR ADVANCE CARE PLANNING DISCUSS DOCUMENTED IN MEDICAL RECORD: ICD-10-PCS | Mod: S$GLB,,, | Performed by: NURSE PRACTITIONER

## 2021-03-19 PROCEDURE — 99499 RISK ADDL DX/OHS AUDIT: ICD-10-PCS | Mod: S$GLB,,, | Performed by: NURSE PRACTITIONER

## 2021-03-19 PROCEDURE — 3288F PR FALLS RISK ASSESSMENT DOCUMENTED: ICD-10-PCS | Mod: CPTII,S$GLB,, | Performed by: NURSE PRACTITIONER

## 2021-03-19 PROCEDURE — G0439 PPPS, SUBSEQ VISIT: HCPCS | Mod: S$GLB,,, | Performed by: NURSE PRACTITIONER

## 2021-03-19 PROCEDURE — 3078F DIAST BP <80 MM HG: CPT | Mod: CPTII,S$GLB,, | Performed by: NURSE PRACTITIONER

## 2021-03-19 PROCEDURE — 99999 PR PBB SHADOW E&M-EST. PATIENT-LVL V: CPT | Mod: PBBFAC,,, | Performed by: NURSE PRACTITIONER

## 2021-03-19 PROCEDURE — 3044F HG A1C LEVEL LT 7.0%: CPT | Mod: CPTII,S$GLB,, | Performed by: NURSE PRACTITIONER

## 2021-03-19 PROCEDURE — 3074F PR MOST RECENT SYSTOLIC BLOOD PRESSURE < 130 MM HG: ICD-10-PCS | Mod: CPTII,S$GLB,, | Performed by: NURSE PRACTITIONER

## 2021-03-19 PROCEDURE — 1101F PT FALLS ASSESS-DOCD LE1/YR: CPT | Mod: CPTII,S$GLB,, | Performed by: NURSE PRACTITIONER

## 2021-03-19 PROCEDURE — 3288F FALL RISK ASSESSMENT DOCD: CPT | Mod: CPTII,S$GLB,, | Performed by: NURSE PRACTITIONER

## 2021-03-19 PROCEDURE — 99999 PR PBB SHADOW E&M-EST. PATIENT-LVL V: ICD-10-PCS | Mod: PBBFAC,,, | Performed by: NURSE PRACTITIONER

## 2021-03-19 PROCEDURE — 3074F SYST BP LT 130 MM HG: CPT | Mod: CPTII,S$GLB,, | Performed by: NURSE PRACTITIONER

## 2021-03-19 PROCEDURE — 1101F PR PT FALLS ASSESS DOC 0-1 FALLS W/OUT INJ PAST YR: ICD-10-PCS | Mod: CPTII,S$GLB,, | Performed by: NURSE PRACTITIONER

## 2021-04-05 ENCOUNTER — OFFICE VISIT (OUTPATIENT)
Dept: INTERNAL MEDICINE | Facility: CLINIC | Age: 76
End: 2021-04-05
Payer: MEDICARE

## 2021-04-05 VITALS
SYSTOLIC BLOOD PRESSURE: 118 MMHG | HEIGHT: 65 IN | HEART RATE: 99 BPM | TEMPERATURE: 98 F | OXYGEN SATURATION: 98 % | WEIGHT: 143.88 LBS | BODY MASS INDEX: 23.97 KG/M2 | DIASTOLIC BLOOD PRESSURE: 62 MMHG

## 2021-04-05 DIAGNOSIS — F33.1 MAJOR DEPRESSIVE DISORDER, RECURRENT EPISODE, MODERATE: ICD-10-CM

## 2021-04-05 DIAGNOSIS — E11.8 CONTROLLED TYPE 2 DIABETES MELLITUS WITH COMPLICATION, WITHOUT LONG-TERM CURRENT USE OF INSULIN: Primary | ICD-10-CM

## 2021-04-05 DIAGNOSIS — E78.5 HYPERLIPIDEMIA ASSOCIATED WITH TYPE 2 DIABETES MELLITUS: ICD-10-CM

## 2021-04-05 DIAGNOSIS — E11.69 HYPERLIPIDEMIA ASSOCIATED WITH TYPE 2 DIABETES MELLITUS: ICD-10-CM

## 2021-04-05 PROCEDURE — 99499 UNLISTED E&M SERVICE: CPT | Mod: S$GLB,,, | Performed by: FAMILY MEDICINE

## 2021-04-05 PROCEDURE — 3288F PR FALLS RISK ASSESSMENT DOCUMENTED: ICD-10-PCS | Mod: CPTII,S$GLB,, | Performed by: FAMILY MEDICINE

## 2021-04-05 PROCEDURE — 3288F FALL RISK ASSESSMENT DOCD: CPT | Mod: CPTII,S$GLB,, | Performed by: FAMILY MEDICINE

## 2021-04-05 PROCEDURE — 3074F PR MOST RECENT SYSTOLIC BLOOD PRESSURE < 130 MM HG: ICD-10-PCS | Mod: CPTII,S$GLB,, | Performed by: FAMILY MEDICINE

## 2021-04-05 PROCEDURE — 3078F DIAST BP <80 MM HG: CPT | Mod: CPTII,S$GLB,, | Performed by: FAMILY MEDICINE

## 2021-04-05 PROCEDURE — 1126F PR PAIN SEVERITY QUANTIFIED, NO PAIN PRESENT: ICD-10-PCS | Mod: S$GLB,,, | Performed by: FAMILY MEDICINE

## 2021-04-05 PROCEDURE — 3074F SYST BP LT 130 MM HG: CPT | Mod: CPTII,S$GLB,, | Performed by: FAMILY MEDICINE

## 2021-04-05 PROCEDURE — 1126F AMNT PAIN NOTED NONE PRSNT: CPT | Mod: S$GLB,,, | Performed by: FAMILY MEDICINE

## 2021-04-05 PROCEDURE — 99214 OFFICE O/P EST MOD 30 MIN: CPT | Mod: S$GLB,,, | Performed by: FAMILY MEDICINE

## 2021-04-05 PROCEDURE — 1159F PR MEDICATION LIST DOCUMENTED IN MEDICAL RECORD: ICD-10-PCS | Mod: S$GLB,,, | Performed by: FAMILY MEDICINE

## 2021-04-05 PROCEDURE — 1159F MED LIST DOCD IN RCRD: CPT | Mod: S$GLB,,, | Performed by: FAMILY MEDICINE

## 2021-04-05 PROCEDURE — 99999 PR PBB SHADOW E&M-EST. PATIENT-LVL IV: CPT | Mod: PBBFAC,,, | Performed by: FAMILY MEDICINE

## 2021-04-05 PROCEDURE — 1101F PT FALLS ASSESS-DOCD LE1/YR: CPT | Mod: CPTII,S$GLB,, | Performed by: FAMILY MEDICINE

## 2021-04-05 PROCEDURE — 1101F PR PT FALLS ASSESS DOC 0-1 FALLS W/OUT INJ PAST YR: ICD-10-PCS | Mod: CPTII,S$GLB,, | Performed by: FAMILY MEDICINE

## 2021-04-05 PROCEDURE — 99214 PR OFFICE/OUTPT VISIT, EST, LEVL IV, 30-39 MIN: ICD-10-PCS | Mod: S$GLB,,, | Performed by: FAMILY MEDICINE

## 2021-04-05 PROCEDURE — 3044F PR MOST RECENT HEMOGLOBIN A1C LEVEL <7.0%: ICD-10-PCS | Mod: CPTII,S$GLB,, | Performed by: FAMILY MEDICINE

## 2021-04-05 PROCEDURE — 3044F HG A1C LEVEL LT 7.0%: CPT | Mod: CPTII,S$GLB,, | Performed by: FAMILY MEDICINE

## 2021-04-05 PROCEDURE — 99499 RISK ADDL DX/OHS AUDIT: ICD-10-PCS | Mod: S$GLB,,, | Performed by: FAMILY MEDICINE

## 2021-04-05 PROCEDURE — 3078F PR MOST RECENT DIASTOLIC BLOOD PRESSURE < 80 MM HG: ICD-10-PCS | Mod: CPTII,S$GLB,, | Performed by: FAMILY MEDICINE

## 2021-04-05 PROCEDURE — 99999 PR PBB SHADOW E&M-EST. PATIENT-LVL IV: ICD-10-PCS | Mod: PBBFAC,,, | Performed by: FAMILY MEDICINE

## 2021-06-02 ENCOUNTER — TELEPHONE (OUTPATIENT)
Dept: INTERNAL MEDICINE | Facility: CLINIC | Age: 76
End: 2021-06-02

## 2021-06-03 RX ORDER — METFORMIN HYDROCHLORIDE 500 MG/1
TABLET, EXTENDED RELEASE ORAL
Qty: 360 TABLET | Refills: 3 | Status: SHIPPED | OUTPATIENT
Start: 2021-06-03 | End: 2022-03-29

## 2021-06-03 RX ORDER — FAMOTIDINE 40 MG/1
40 TABLET, FILM COATED ORAL DAILY
Qty: 90 TABLET | Refills: 3 | Status: SHIPPED | OUTPATIENT
Start: 2021-06-03 | End: 2022-03-29

## 2021-08-18 ENCOUNTER — LAB VISIT (OUTPATIENT)
Dept: LAB | Facility: HOSPITAL | Age: 76
End: 2021-08-18
Attending: FAMILY MEDICINE
Payer: MEDICARE

## 2021-08-18 DIAGNOSIS — E11.69 HYPERLIPIDEMIA ASSOCIATED WITH TYPE 2 DIABETES MELLITUS: ICD-10-CM

## 2021-08-18 DIAGNOSIS — E78.5 HYPERLIPIDEMIA ASSOCIATED WITH TYPE 2 DIABETES MELLITUS: ICD-10-CM

## 2021-08-18 DIAGNOSIS — E11.8 CONTROLLED TYPE 2 DIABETES MELLITUS WITH COMPLICATION, WITHOUT LONG-TERM CURRENT USE OF INSULIN: ICD-10-CM

## 2021-08-18 LAB
ALBUMIN SERPL BCP-MCNC: 4.2 G/DL (ref 3.5–5.2)
ALP SERPL-CCNC: 49 U/L (ref 55–135)
ALT SERPL W/O P-5'-P-CCNC: 20 U/L (ref 10–44)
ANION GAP SERPL CALC-SCNC: 7 MMOL/L (ref 8–16)
AST SERPL-CCNC: 18 U/L (ref 10–40)
BILIRUB SERPL-MCNC: 1.1 MG/DL (ref 0.1–1)
BUN SERPL-MCNC: 17 MG/DL (ref 8–23)
CALCIUM SERPL-MCNC: 9.7 MG/DL (ref 8.7–10.5)
CHLORIDE SERPL-SCNC: 102 MMOL/L (ref 95–110)
CHOLEST SERPL-MCNC: 130 MG/DL (ref 120–199)
CHOLEST/HDLC SERPL: 2.4 {RATIO} (ref 2–5)
CO2 SERPL-SCNC: 32 MMOL/L (ref 23–29)
CREAT SERPL-MCNC: 0.7 MG/DL (ref 0.5–1.4)
EST. GFR  (AFRICAN AMERICAN): >60 ML/MIN/1.73 M^2
EST. GFR  (NON AFRICAN AMERICAN): >60 ML/MIN/1.73 M^2
ESTIMATED AVG GLUCOSE: 111 MG/DL (ref 68–131)
GLUCOSE SERPL-MCNC: 105 MG/DL (ref 70–110)
HBA1C MFR BLD: 5.5 % (ref 4–5.6)
HDLC SERPL-MCNC: 54 MG/DL (ref 40–75)
HDLC SERPL: 41.5 % (ref 20–50)
LDLC SERPL CALC-MCNC: 61.2 MG/DL (ref 63–159)
NONHDLC SERPL-MCNC: 76 MG/DL
POTASSIUM SERPL-SCNC: 4 MMOL/L (ref 3.5–5.1)
PROT SERPL-MCNC: 6.8 G/DL (ref 6–8.4)
SODIUM SERPL-SCNC: 141 MMOL/L (ref 136–145)
TRIGL SERPL-MCNC: 74 MG/DL (ref 30–150)

## 2021-08-18 PROCEDURE — 80061 LIPID PANEL: CPT | Performed by: FAMILY MEDICINE

## 2021-08-18 PROCEDURE — 80053 COMPREHEN METABOLIC PANEL: CPT | Performed by: FAMILY MEDICINE

## 2021-08-18 PROCEDURE — 36415 COLL VENOUS BLD VENIPUNCTURE: CPT | Performed by: FAMILY MEDICINE

## 2021-08-18 PROCEDURE — 83036 HEMOGLOBIN GLYCOSYLATED A1C: CPT | Performed by: FAMILY MEDICINE

## 2021-08-24 DIAGNOSIS — N39.46 MIXED STRESS AND URGE URINARY INCONTINENCE: ICD-10-CM

## 2021-08-25 ENCOUNTER — OFFICE VISIT (OUTPATIENT)
Dept: INTERNAL MEDICINE | Facility: CLINIC | Age: 76
End: 2021-08-25
Payer: MEDICARE

## 2021-08-25 VITALS
TEMPERATURE: 98 F | WEIGHT: 142.5 LBS | OXYGEN SATURATION: 94 % | BODY MASS INDEX: 23.74 KG/M2 | DIASTOLIC BLOOD PRESSURE: 62 MMHG | SYSTOLIC BLOOD PRESSURE: 124 MMHG | HEART RATE: 101 BPM | HEIGHT: 65 IN

## 2021-08-25 DIAGNOSIS — Z00.00 ANNUAL PHYSICAL EXAM: Primary | ICD-10-CM

## 2021-08-25 DIAGNOSIS — F41.1 GENERALIZED ANXIETY DISORDER: ICD-10-CM

## 2021-08-25 DIAGNOSIS — E11.69 HYPERLIPIDEMIA ASSOCIATED WITH TYPE 2 DIABETES MELLITUS: ICD-10-CM

## 2021-08-25 DIAGNOSIS — F33.1 MAJOR DEPRESSIVE DISORDER, RECURRENT EPISODE, MODERATE: ICD-10-CM

## 2021-08-25 DIAGNOSIS — E11.8 CONTROLLED TYPE 2 DIABETES MELLITUS WITH COMPLICATION, WITHOUT LONG-TERM CURRENT USE OF INSULIN: ICD-10-CM

## 2021-08-25 DIAGNOSIS — E78.5 HYPERLIPIDEMIA ASSOCIATED WITH TYPE 2 DIABETES MELLITUS: ICD-10-CM

## 2021-08-25 DIAGNOSIS — F43.0 STRESS REACTION: ICD-10-CM

## 2021-08-25 PROCEDURE — 1159F PR MEDICATION LIST DOCUMENTED IN MEDICAL RECORD: ICD-10-PCS | Mod: CPTII,S$GLB,, | Performed by: FAMILY MEDICINE

## 2021-08-25 PROCEDURE — 1126F AMNT PAIN NOTED NONE PRSNT: CPT | Mod: CPTII,S$GLB,, | Performed by: FAMILY MEDICINE

## 2021-08-25 PROCEDURE — 99499 RISK ADDL DX/OHS AUDIT: ICD-10-PCS | Mod: HCNC,S$GLB,, | Performed by: FAMILY MEDICINE

## 2021-08-25 PROCEDURE — 1101F PR PT FALLS ASSESS DOC 0-1 FALLS W/OUT INJ PAST YR: ICD-10-PCS | Mod: CPTII,S$GLB,, | Performed by: FAMILY MEDICINE

## 2021-08-25 PROCEDURE — 1160F RVW MEDS BY RX/DR IN RCRD: CPT | Mod: CPTII,S$GLB,, | Performed by: FAMILY MEDICINE

## 2021-08-25 PROCEDURE — 99397 PER PM REEVAL EST PAT 65+ YR: CPT | Mod: S$GLB,,, | Performed by: FAMILY MEDICINE

## 2021-08-25 PROCEDURE — 1159F MED LIST DOCD IN RCRD: CPT | Mod: CPTII,S$GLB,, | Performed by: FAMILY MEDICINE

## 2021-08-25 PROCEDURE — 99499 UNLISTED E&M SERVICE: CPT | Mod: HCNC,S$GLB,, | Performed by: FAMILY MEDICINE

## 2021-08-25 PROCEDURE — 3288F PR FALLS RISK ASSESSMENT DOCUMENTED: ICD-10-PCS | Mod: CPTII,S$GLB,, | Performed by: FAMILY MEDICINE

## 2021-08-25 PROCEDURE — 1160F PR REVIEW ALL MEDS BY PRESCRIBER/CLIN PHARMACIST DOCUMENTED: ICD-10-PCS | Mod: CPTII,S$GLB,, | Performed by: FAMILY MEDICINE

## 2021-08-25 PROCEDURE — 1101F PT FALLS ASSESS-DOCD LE1/YR: CPT | Mod: CPTII,S$GLB,, | Performed by: FAMILY MEDICINE

## 2021-08-25 PROCEDURE — 3074F SYST BP LT 130 MM HG: CPT | Mod: CPTII,S$GLB,, | Performed by: FAMILY MEDICINE

## 2021-08-25 PROCEDURE — 3078F PR MOST RECENT DIASTOLIC BLOOD PRESSURE < 80 MM HG: ICD-10-PCS | Mod: CPTII,S$GLB,, | Performed by: FAMILY MEDICINE

## 2021-08-25 PROCEDURE — 1126F PR PAIN SEVERITY QUANTIFIED, NO PAIN PRESENT: ICD-10-PCS | Mod: CPTII,S$GLB,, | Performed by: FAMILY MEDICINE

## 2021-08-25 PROCEDURE — 3074F PR MOST RECENT SYSTOLIC BLOOD PRESSURE < 130 MM HG: ICD-10-PCS | Mod: CPTII,S$GLB,, | Performed by: FAMILY MEDICINE

## 2021-08-25 PROCEDURE — 99999 PR PBB SHADOW E&M-EST. PATIENT-LVL IV: ICD-10-PCS | Mod: PBBFAC,,, | Performed by: FAMILY MEDICINE

## 2021-08-25 PROCEDURE — 99999 PR PBB SHADOW E&M-EST. PATIENT-LVL IV: CPT | Mod: PBBFAC,,, | Performed by: FAMILY MEDICINE

## 2021-08-25 PROCEDURE — 3288F FALL RISK ASSESSMENT DOCD: CPT | Mod: CPTII,S$GLB,, | Performed by: FAMILY MEDICINE

## 2021-08-25 PROCEDURE — 99397 PR PREVENTIVE VISIT,EST,65 & OVER: ICD-10-PCS | Mod: S$GLB,,, | Performed by: FAMILY MEDICINE

## 2021-08-25 PROCEDURE — 3078F DIAST BP <80 MM HG: CPT | Mod: CPTII,S$GLB,, | Performed by: FAMILY MEDICINE

## 2021-08-25 RX ORDER — OXYBUTYNIN CHLORIDE 5 MG/1
5 TABLET ORAL 2 TIMES DAILY
Qty: 180 TABLET | Refills: 3 | Status: SHIPPED | OUTPATIENT
Start: 2021-08-25 | End: 2022-08-24

## 2021-08-25 RX ORDER — CLONAZEPAM 0.5 MG/1
TABLET ORAL
Qty: 30 TABLET | Refills: 0 | Status: SHIPPED | OUTPATIENT
Start: 2021-08-25

## 2021-10-22 ENCOUNTER — TELEPHONE (OUTPATIENT)
Dept: INTERNAL MEDICINE | Facility: CLINIC | Age: 76
End: 2021-10-22

## 2021-10-22 DIAGNOSIS — Z12.31 SCREENING MAMMOGRAM, ENCOUNTER FOR: Primary | ICD-10-CM

## 2021-11-15 ENCOUNTER — OFFICE VISIT (OUTPATIENT)
Dept: INTERNAL MEDICINE | Facility: CLINIC | Age: 76
End: 2021-11-15
Payer: MEDICARE

## 2021-11-15 VITALS
SYSTOLIC BLOOD PRESSURE: 122 MMHG | TEMPERATURE: 96 F | DIASTOLIC BLOOD PRESSURE: 70 MMHG | OXYGEN SATURATION: 96 % | BODY MASS INDEX: 23.52 KG/M2 | WEIGHT: 141.13 LBS | HEART RATE: 93 BPM | HEIGHT: 65 IN

## 2021-11-15 DIAGNOSIS — I83.811 VARICOSE VEINS OF LEG WITH PAIN, RIGHT: Primary | ICD-10-CM

## 2021-11-15 PROCEDURE — 1101F PR PT FALLS ASSESS DOC 0-1 FALLS W/OUT INJ PAST YR: ICD-10-PCS | Mod: HCNC,CPTII,S$GLB, | Performed by: PHYSICIAN ASSISTANT

## 2021-11-15 PROCEDURE — 1160F PR REVIEW ALL MEDS BY PRESCRIBER/CLIN PHARMACIST DOCUMENTED: ICD-10-PCS | Mod: HCNC,CPTII,S$GLB, | Performed by: PHYSICIAN ASSISTANT

## 2021-11-15 PROCEDURE — 3074F PR MOST RECENT SYSTOLIC BLOOD PRESSURE < 130 MM HG: ICD-10-PCS | Mod: HCNC,CPTII,S$GLB, | Performed by: PHYSICIAN ASSISTANT

## 2021-11-15 PROCEDURE — 3078F DIAST BP <80 MM HG: CPT | Mod: HCNC,CPTII,S$GLB, | Performed by: PHYSICIAN ASSISTANT

## 2021-11-15 PROCEDURE — 3288F PR FALLS RISK ASSESSMENT DOCUMENTED: ICD-10-PCS | Mod: HCNC,CPTII,S$GLB, | Performed by: PHYSICIAN ASSISTANT

## 2021-11-15 PROCEDURE — 99213 PR OFFICE/OUTPT VISIT, EST, LEVL III, 20-29 MIN: ICD-10-PCS | Mod: HCNC,S$GLB,, | Performed by: PHYSICIAN ASSISTANT

## 2021-11-15 PROCEDURE — 1159F PR MEDICATION LIST DOCUMENTED IN MEDICAL RECORD: ICD-10-PCS | Mod: HCNC,CPTII,S$GLB, | Performed by: PHYSICIAN ASSISTANT

## 2021-11-15 PROCEDURE — 3074F SYST BP LT 130 MM HG: CPT | Mod: HCNC,CPTII,S$GLB, | Performed by: PHYSICIAN ASSISTANT

## 2021-11-15 PROCEDURE — 99213 OFFICE O/P EST LOW 20 MIN: CPT | Mod: HCNC,S$GLB,, | Performed by: PHYSICIAN ASSISTANT

## 2021-11-15 PROCEDURE — 99999 PR PBB SHADOW E&M-EST. PATIENT-LVL IV: ICD-10-PCS | Mod: PBBFAC,HCNC,, | Performed by: PHYSICIAN ASSISTANT

## 2021-11-15 PROCEDURE — 3288F FALL RISK ASSESSMENT DOCD: CPT | Mod: HCNC,CPTII,S$GLB, | Performed by: PHYSICIAN ASSISTANT

## 2021-11-15 PROCEDURE — 1160F RVW MEDS BY RX/DR IN RCRD: CPT | Mod: HCNC,CPTII,S$GLB, | Performed by: PHYSICIAN ASSISTANT

## 2021-11-15 PROCEDURE — 1159F MED LIST DOCD IN RCRD: CPT | Mod: HCNC,CPTII,S$GLB, | Performed by: PHYSICIAN ASSISTANT

## 2021-11-15 PROCEDURE — 99999 PR PBB SHADOW E&M-EST. PATIENT-LVL IV: CPT | Mod: PBBFAC,HCNC,, | Performed by: PHYSICIAN ASSISTANT

## 2021-11-15 PROCEDURE — 3078F PR MOST RECENT DIASTOLIC BLOOD PRESSURE < 80 MM HG: ICD-10-PCS | Mod: HCNC,CPTII,S$GLB, | Performed by: PHYSICIAN ASSISTANT

## 2021-11-15 PROCEDURE — 1101F PT FALLS ASSESS-DOCD LE1/YR: CPT | Mod: HCNC,CPTII,S$GLB, | Performed by: PHYSICIAN ASSISTANT

## 2021-12-15 ENCOUNTER — HOSPITAL ENCOUNTER (OUTPATIENT)
Dept: RADIOLOGY | Facility: HOSPITAL | Age: 76
Discharge: HOME | End: 2021-12-15
Attending: FAMILY MEDICINE
Payer: MEDICARE

## 2021-12-15 VITALS — BODY MASS INDEX: 23.52 KG/M2 | WEIGHT: 141.13 LBS | HEIGHT: 65 IN

## 2021-12-15 DIAGNOSIS — Z12.31 SCREENING MAMMOGRAM, ENCOUNTER FOR: ICD-10-CM

## 2021-12-15 PROCEDURE — 77067 MAMMO DIGITAL SCREENING BILAT WITH TOMO: ICD-10-PCS | Mod: 26,HCNC,, | Performed by: RADIOLOGY

## 2021-12-15 PROCEDURE — 77063 BREAST TOMOSYNTHESIS BI: CPT | Mod: 26,HCNC,, | Performed by: RADIOLOGY

## 2021-12-15 PROCEDURE — 77067 SCR MAMMO BI INCL CAD: CPT | Mod: TC,HCNC

## 2021-12-15 PROCEDURE — 77063 MAMMO DIGITAL SCREENING BILAT WITH TOMO: ICD-10-PCS | Mod: 26,HCNC,, | Performed by: RADIOLOGY

## 2021-12-15 PROCEDURE — 77067 SCR MAMMO BI INCL CAD: CPT | Mod: 26,HCNC,, | Performed by: RADIOLOGY

## 2022-01-05 ENCOUNTER — TELEPHONE (OUTPATIENT)
Dept: INTERNAL MEDICINE | Facility: CLINIC | Age: 77
End: 2022-01-05
Payer: MEDICARE

## 2022-01-05 NOTE — TELEPHONE ENCOUNTER
Patient went to urgent care on wed of last week. She said they tested her for covid and flu both were negative. She said that she still having a URI. She said that she is having surgery at the end of this month so she is trying to clear this cough that she has. She said she had started feeling bad that last Tuesday before she went to the urgent care. She said they prescribed montelukast they told her to take one at night. She said she stopped taking it because it caused gas for her. She said that they also gave her a nasal spray that she has been using. She has tried nitequil and dayquil is what she has been taking and she hasn't gotten any better. She asked if you could call something in for her. She said when she spits up the mucus isn't clear.

## 2022-01-05 NOTE — TELEPHONE ENCOUNTER
----- Message from Sigifredo Mccullough sent at 1/5/2022  8:15 AM CST -----  Contact: self  Pt would like to consult with nurse regarding upper respiratory infection.  Please contact Connie Fields @ [phone].  Thanks/As

## 2022-01-07 DIAGNOSIS — F41.1 GENERALIZED ANXIETY DISORDER: ICD-10-CM

## 2022-01-07 RX ORDER — PAROXETINE 30 MG/1
30 TABLET, FILM COATED ORAL EVERY MORNING
Qty: 90 TABLET | Refills: 1 | Status: SHIPPED | OUTPATIENT
Start: 2022-01-07 | End: 2022-05-30

## 2022-01-07 NOTE — TELEPHONE ENCOUNTER
No new care gaps identified.  Powered by [employer]. Reference number: [id number].   1/07/2022 8:57:08 AM CST

## 2022-01-11 DIAGNOSIS — G47.00 INSOMNIA, UNSPECIFIED TYPE: ICD-10-CM

## 2022-01-11 RX ORDER — TRAZODONE HYDROCHLORIDE 50 MG/1
50 TABLET ORAL NIGHTLY
Qty: 90 TABLET | Refills: 3 | Status: SHIPPED | OUTPATIENT
Start: 2022-01-11 | End: 2022-12-30

## 2022-01-11 NOTE — TELEPHONE ENCOUNTER
Care Due:                  Date            Visit Type   Department     Provider  --------------------------------------------------------------------------------                                             [facility]  Last Visit: 08-      None         TENZIN Erwin                                           [facility]  Next Visit: 03-      None         TENZIN Erwin                                                            Last  Test          Frequency    Reason                     Performed    Due Date  --------------------------------------------------------------------------------    HBA1C.......  6 months...  metFORMIN................  08- 02-    Powered by [employer]. Reference number: [id number].   1/11/2022 11:16:51 AM CST

## 2022-02-23 ENCOUNTER — LAB VISIT (OUTPATIENT)
Dept: LAB | Facility: HOSPITAL | Age: 77
End: 2022-02-23
Attending: FAMILY MEDICINE
Payer: MEDICARE

## 2022-02-23 DIAGNOSIS — E11.8 CONTROLLED TYPE 2 DIABETES MELLITUS WITH COMPLICATION, WITHOUT LONG-TERM CURRENT USE OF INSULIN: ICD-10-CM

## 2022-02-23 LAB
ESTIMATED AVG GLUCOSE: 114 MG/DL (ref 68–131)
HBA1C MFR BLD: 5.6 % (ref 4–5.6)

## 2022-02-23 PROCEDURE — 83036 HEMOGLOBIN GLYCOSYLATED A1C: CPT | Mod: HCNC | Performed by: FAMILY MEDICINE

## 2022-02-23 PROCEDURE — 36415 COLL VENOUS BLD VENIPUNCTURE: CPT | Mod: HCNC | Performed by: FAMILY MEDICINE

## 2022-03-02 ENCOUNTER — OFFICE VISIT (OUTPATIENT)
Dept: INTERNAL MEDICINE | Facility: CLINIC | Age: 77
End: 2022-03-02
Payer: MEDICARE

## 2022-03-02 VITALS
BODY MASS INDEX: 22.97 KG/M2 | HEART RATE: 108 BPM | SYSTOLIC BLOOD PRESSURE: 130 MMHG | WEIGHT: 137.88 LBS | DIASTOLIC BLOOD PRESSURE: 72 MMHG | OXYGEN SATURATION: 98 % | TEMPERATURE: 99 F | HEIGHT: 65 IN

## 2022-03-02 DIAGNOSIS — R41.3 MEMORY IMPAIRMENT: ICD-10-CM

## 2022-03-02 DIAGNOSIS — F33.1 MAJOR DEPRESSIVE DISORDER, RECURRENT EPISODE, MODERATE: ICD-10-CM

## 2022-03-02 DIAGNOSIS — Z12.11 COLON CANCER SCREENING: ICD-10-CM

## 2022-03-02 DIAGNOSIS — E11.8 CONTROLLED TYPE 2 DIABETES MELLITUS WITH COMPLICATION, WITHOUT LONG-TERM CURRENT USE OF INSULIN: Primary | ICD-10-CM

## 2022-03-02 DIAGNOSIS — E78.5 HYPERLIPIDEMIA ASSOCIATED WITH TYPE 2 DIABETES MELLITUS: ICD-10-CM

## 2022-03-02 DIAGNOSIS — Z86.010 HISTORY OF COLON POLYPS: ICD-10-CM

## 2022-03-02 DIAGNOSIS — E11.69 HYPERLIPIDEMIA ASSOCIATED WITH TYPE 2 DIABETES MELLITUS: ICD-10-CM

## 2022-03-02 PROCEDURE — 1159F MED LIST DOCD IN RCRD: CPT | Mod: CPTII,S$GLB,,

## 2022-03-02 PROCEDURE — 1101F PT FALLS ASSESS-DOCD LE1/YR: CPT | Mod: CPTII,S$GLB,,

## 2022-03-02 PROCEDURE — 99999 PR PBB SHADOW E&M-EST. PATIENT-LVL IV: CPT | Mod: PBBFAC,,,

## 2022-03-02 PROCEDURE — 1159F PR MEDICATION LIST DOCUMENTED IN MEDICAL RECORD: ICD-10-PCS | Mod: CPTII,S$GLB,,

## 2022-03-02 PROCEDURE — 1160F RVW MEDS BY RX/DR IN RCRD: CPT | Mod: CPTII,S$GLB,,

## 2022-03-02 PROCEDURE — 3075F SYST BP GE 130 - 139MM HG: CPT | Mod: CPTII,S$GLB,,

## 2022-03-02 PROCEDURE — 3075F PR MOST RECENT SYSTOLIC BLOOD PRESS GE 130-139MM HG: ICD-10-PCS | Mod: CPTII,S$GLB,,

## 2022-03-02 PROCEDURE — 99999 PR PBB SHADOW E&M-EST. PATIENT-LVL IV: ICD-10-PCS | Mod: PBBFAC,,,

## 2022-03-02 PROCEDURE — 1126F PR PAIN SEVERITY QUANTIFIED, NO PAIN PRESENT: ICD-10-PCS | Mod: CPTII,S$GLB,,

## 2022-03-02 PROCEDURE — 1160F PR REVIEW ALL MEDS BY PRESCRIBER/CLIN PHARMACIST DOCUMENTED: ICD-10-PCS | Mod: CPTII,S$GLB,,

## 2022-03-02 PROCEDURE — 99214 OFFICE O/P EST MOD 30 MIN: CPT | Mod: S$GLB,,,

## 2022-03-02 PROCEDURE — 3288F FALL RISK ASSESSMENT DOCD: CPT | Mod: CPTII,S$GLB,,

## 2022-03-02 PROCEDURE — 99214 PR OFFICE/OUTPT VISIT, EST, LEVL IV, 30-39 MIN: ICD-10-PCS | Mod: S$GLB,,,

## 2022-03-02 PROCEDURE — 3078F DIAST BP <80 MM HG: CPT | Mod: CPTII,S$GLB,,

## 2022-03-02 PROCEDURE — 99499 UNLISTED E&M SERVICE: CPT | Mod: S$GLB,,,

## 2022-03-02 PROCEDURE — 1126F AMNT PAIN NOTED NONE PRSNT: CPT | Mod: CPTII,S$GLB,,

## 2022-03-02 PROCEDURE — 1101F PR PT FALLS ASSESS DOC 0-1 FALLS W/OUT INJ PAST YR: ICD-10-PCS | Mod: CPTII,S$GLB,,

## 2022-03-02 PROCEDURE — 3078F PR MOST RECENT DIASTOLIC BLOOD PRESSURE < 80 MM HG: ICD-10-PCS | Mod: CPTII,S$GLB,,

## 2022-03-02 PROCEDURE — 3288F PR FALLS RISK ASSESSMENT DOCUMENTED: ICD-10-PCS | Mod: CPTII,S$GLB,,

## 2022-03-02 PROCEDURE — 99499 RISK ADDL DX/OHS AUDIT: ICD-10-PCS | Mod: S$GLB,,,

## 2022-03-08 ENCOUNTER — PATIENT OUTREACH (OUTPATIENT)
Dept: ADMINISTRATIVE | Facility: OTHER | Age: 77
End: 2022-03-08
Payer: MEDICARE

## 2022-03-09 ENCOUNTER — OFFICE VISIT (OUTPATIENT)
Dept: OPHTHALMOLOGY | Facility: CLINIC | Age: 77
End: 2022-03-09
Payer: MEDICARE

## 2022-03-09 ENCOUNTER — PATIENT MESSAGE (OUTPATIENT)
Dept: OPHTHALMOLOGY | Facility: CLINIC | Age: 77
End: 2022-03-09

## 2022-03-09 DIAGNOSIS — Z96.1 PSEUDOPHAKIA OF BOTH EYES: ICD-10-CM

## 2022-03-09 DIAGNOSIS — H52.7 REFRACTIVE DISORDER: ICD-10-CM

## 2022-03-09 DIAGNOSIS — E11.9 DIABETES MELLITUS TYPE 2 WITHOUT RETINOPATHY: Primary | ICD-10-CM

## 2022-03-09 PROCEDURE — 92014 COMPRE OPH EXAM EST PT 1/>: CPT | Mod: S$GLB,,, | Performed by: OPTOMETRIST

## 2022-03-09 PROCEDURE — 92015 PR REFRACTION: ICD-10-PCS | Mod: S$GLB,,, | Performed by: OPTOMETRIST

## 2022-03-09 PROCEDURE — 1126F AMNT PAIN NOTED NONE PRSNT: CPT | Mod: CPTII,S$GLB,, | Performed by: OPTOMETRIST

## 2022-03-09 PROCEDURE — 1159F PR MEDICATION LIST DOCUMENTED IN MEDICAL RECORD: ICD-10-PCS | Mod: CPTII,S$GLB,, | Performed by: OPTOMETRIST

## 2022-03-09 PROCEDURE — 1159F MED LIST DOCD IN RCRD: CPT | Mod: CPTII,S$GLB,, | Performed by: OPTOMETRIST

## 2022-03-09 PROCEDURE — 99499 RISK ADDL DX/OHS AUDIT: ICD-10-PCS | Mod: S$GLB,,, | Performed by: OPTOMETRIST

## 2022-03-09 PROCEDURE — 99999 PR PBB SHADOW E&M-EST. PATIENT-LVL III: ICD-10-PCS | Mod: PBBFAC,,, | Performed by: OPTOMETRIST

## 2022-03-09 PROCEDURE — 92014 PR EYE EXAM, EST PATIENT,COMPREHESV: ICD-10-PCS | Mod: S$GLB,,, | Performed by: OPTOMETRIST

## 2022-03-09 PROCEDURE — 92015 DETERMINE REFRACTIVE STATE: CPT | Mod: S$GLB,,, | Performed by: OPTOMETRIST

## 2022-03-09 PROCEDURE — 1126F PR PAIN SEVERITY QUANTIFIED, NO PAIN PRESENT: ICD-10-PCS | Mod: CPTII,S$GLB,, | Performed by: OPTOMETRIST

## 2022-03-09 PROCEDURE — 99999 PR PBB SHADOW E&M-EST. PATIENT-LVL III: CPT | Mod: PBBFAC,,, | Performed by: OPTOMETRIST

## 2022-03-09 PROCEDURE — 99499 UNLISTED E&M SERVICE: CPT | Mod: S$GLB,,, | Performed by: OPTOMETRIST

## 2022-03-09 RX ORDER — FLUTICASONE PROPIONATE 50 MCG
SPRAY, SUSPENSION (ML) NASAL
COMMUNITY
Start: 2021-12-28 | End: 2022-04-06 | Stop reason: SDUPTHER

## 2022-03-09 RX ORDER — METFORMIN HYDROCHLORIDE 1000 MG/1
TABLET ORAL
COMMUNITY
End: 2022-03-29 | Stop reason: SDUPTHER

## 2022-03-09 RX ORDER — MONTELUKAST SODIUM 10 MG/1
TABLET ORAL
COMMUNITY
Start: 2021-12-28

## 2022-03-09 NOTE — PROGRESS NOTES
Health Maintenance Due   Topic Date Due    TETANUS VACCINE  Never done    Colonoscopy  12/06/2021    Eye Exam  03/01/2022     Updates were requested from care everywhere.  Chart was reviewed for overdue Proactive [facility] Encounters ([facility]) topics (CRS, Breast Cancer Screening, Eye exam)  Health Maintenance has been updated.  LINKS immunization registry triggered.  Immunizations were reconciled.

## 2022-03-09 NOTE — PROGRESS NOTES
HPI     Diabetic Eye Exam      Additional comments: Yearly exam              Comments     Dm since 2010  PCIOL ou  Lab Results       Component                Value               Date                       HGBA1C                   5.6                 02/23/2022                      Last edited by Tito Anderson, OD on 3/9/2022  9:33 AM. (History)            Assessment /Plan     For exam results, see Encounter Report.    Diabetes mellitus type 2 without retinopathy    Pseudophakia of both eyes    Refractive disorder      No Background Diabetic Retinopathy    Stable IOL OU.    Dispense Final Rx for glasses or may use OTC glasses.  RTC 1 year  Discussed above and answered questions.                    Patient Education     Bronchitis, Antibiotic Treatment (Adult)    Bronchitis is an infection of the air passages (bronchial tubes) in your lungs. It often occurs when you have a cold. This illness is contagious during the first few days and is spread through the air by coughing and sneezing, or by direct contact (touching the sick person and then touching your own eyes, nose, or mouth).  Symptoms of bronchitis include cough with mucus (phlegm) and low-grade fever. Bronchitis usually lasts 7 to 14 days. Mild cases can be treated with simple home remedies. More severe infection is treated with an antibiotic.  Home care  Follow these guidelines when caring for yourself at home:  · If your symptoms are severe, rest at home for the first 2 to 3 days. When you go back to your usual activities, don't let yourself get too tired.  · Do not smoke. Also avoid being exposed to secondhand smoke.  · You may use over-the-counter medicines to control fever or pain, unless another medicine was prescribed. (Note: If you have chronic liver or kidney disease or have ever had a stomach ulcer or gastrointestinal bleeding, talk with your healthcare provider before using these medicines. Also talk to your provider if you are taking medicine to prevent blood clots.) Aspirin should never be given to anyone younger than 18 years of age who is ill with a viral infection or fever. It may cause severe liver or brain damage.  · Your appetite may be poor, so a light diet is fine. Avoid dehydration by drinking 6 to 8 glasses of fluids per day (such as water, soft drinks, sports drinks, juices, tea, or soup). Extra fluids will help loosen secretions in the nose and lungs.  · Over-the-counter cough, cold, and sore-throat medicines will not shorten the length of the illness, but they may be helpful to reduce symptoms. (Note: Do not use decongestants if you have high blood pressure.)  · Finish all antibiotic medicine. Do this even if you are feeling  better after only a few days.  Follow-up care  Follow up with your healthcare provider, or as advised. If you had an X-ray or ECG (electrocardiogram), a specialist will review it. You will be notified of any new findings that may affect your care.  Note: If you are age 65 or older, or if you have a chronic lung disease or condition that affects your immune system, or you smoke, talk to your healthcare provider about having pneumococcal vaccinations and a yearly influenza vaccination (flu shot).  When to seek medical advice  Call your healthcare provider right away if any of these occur:  · Fever of 100.4°F (38°C) or higher  · Coughing up increased amounts of colored sputum  · Weakness, drowsiness, headache, facial pain, ear pain, or a stiff neck   Call 911, or get immediate medical care  Contact emergency services right away if any of these occur.  · Coughing up blood  · Worsening weakness, drowsiness, headache, or stiff neck  · Trouble breathing, wheezing, or pain with breathing  © 4882-3179 [employer]. [address] [place] [address], [place] [address]. All rights reserved. This information is not intended as a substitute for professional medical care. Always follow your healthcare professional's instructions.

## 2022-03-28 NOTE — TELEPHONE ENCOUNTER
No new care gaps identified.  Powered by [employer]. Reference number: [id number].   3/28/2022 2:43:43 PM CDT

## 2022-03-29 RX ORDER — METFORMIN HYDROCHLORIDE 500 MG/1
TABLET, EXTENDED RELEASE ORAL
Qty: 360 TABLET | Refills: 1 | Status: SHIPPED | OUTPATIENT
Start: 2022-03-29 | End: 2022-09-22

## 2022-03-29 RX ORDER — FAMOTIDINE 40 MG/1
TABLET, FILM COATED ORAL
Qty: 90 TABLET | Refills: 1 | Status: SHIPPED | OUTPATIENT
Start: 2022-03-29 | End: 2022-09-22

## 2022-03-29 NOTE — TELEPHONE ENCOUNTER
Refill Authorization Note   Connie Fields  is requesting a refill authorization.  Brief Assessment and Rationale for Refill:  Approve     Medication Therapy Plan:       Medication Reconciliation Completed: No   Comments:   --->Care Gap information included below if applicable.       Requested Prescriptions   Pending Prescriptions Disp Refills    famotidine (PEPCID) 40 MG tablet [Pharmacy Med Name: FAMOTIDINE 40 MG Tablet] 90 tablet 1     Sig: TAKE 1 TABLET EVERY DAY       Gastroenterology: H2 Antagonists - famotidine Passed - 3/28/2022  2:43 PM        Passed - Patient is at least 18 years old        Passed - Valid encounter within last 15 months     Recent Visits  Date Type Provider Dept   08/25/21 Office Visit Loli Erwin MD [facility]   04/05/21 Office Visit Loli Erwin MD [facility]   02/22/21 Office Visit Ivana Ash MD [facility]   08/21/20 Office Visit Ivana Ash MD [facility]   Showing recent visits within past 720 days and meeting all other requirements  Future Appointments  No visits were found meeting these conditions.  Showing future appointments within next 150 days and meeting all other requirements      Future Appointments              In 5 months SPECIMEN, O'DELVIN O[facility] - Lab, [facility]    In 5 months LABORATORY, O'DELVIN JANA [facility] - Lab, [facility]    In 5 months MD NICA Panda'Delvin - Internal Medicine, [facility]                Passed - Matches previous order       Previous Authorizing Provider: Loli Erwin MD (famotidine (PEPCID) 40 MG tablet)  Previous Authorizing Provider: Loli Erwin MD (metFORMIN (GLUCOPHAGE-XR) 500 MG ER 24hr tablet)  Previous Pharmacy: [facility] - [facility] [phone]  Previous Pharmacy: [facility] - [place] [address] [address]            Passed - No ED/Hospital visits since last PCP visit     Last PCP Visit: 8/25/2021 Last  Admission: 12/6/2016 Last ED Visit:           Passed - Cr is 1.39 or below and within 360 days     Lab Results   Component Value Date    CREATININE 0.7 08/18/2021    CREATININE 0.7 02/17/2021    CREATININE 0.7 03/16/2020              Passed - eGFR is 60 or above and within 360 days     Lab Results   Component Value Date    EGFRNONAA >60.0 08/18/2021    EGFRNONAA >60.0 02/17/2021    EGFRNONAA >60.0 03/16/2020                  metFORMIN (GLUCOPHAGE-XR) 500 MG ER 24hr tablet [Pharmacy Med Name: METFORMIN HYDROCHLORIDE  MG Tablet Extended Release 24 Hour] 360 tablet 1     Sig: TAKE 2 TABLETS TWICE DAILY       Endocrinology:  Diabetes - Biguanides Failed - 3/28/2022  2:43 PM        Failed - Matches previous order       Previous Authorizing Provider: Loli Erwin MD (famotidine (PEPCID) 40 MG tablet)  Previous Authorizing Provider: Loli Erwin MD (metFORMIN (GLUCOPHAGE-XR) 500 MG ER 24hr tablet)  Previous Pharmacy: [facility] [address]  Previous Pharmacy: [facility] [address]            Passed - Patient is at least 18 years old        Passed - Valid encounter within last 15 months     Recent Visits  Date Type Provider Dept   08/25/21 Office Visit Loli Erwin MD [facility]   04/05/21 Office Visit Loli Erwin MD [facility]   02/22/21 Office Visit Ivana Ash MD [facility]   08/21/20 Office Visit Ivana Ash MD [facility]   Showing recent visits within past 720 days and meeting all other requirements  Future Appointments  No visits were found meeting these conditions.  Showing future appointments within next 150 days and meeting all other requirements      Future Appointments              In 5 months SPECIMEN, HARPREET YOUSIF'Delvin - Lab, [facility]    In 5 months LABORATORY, HARPREET YOUSIF'Delvin Ernst, [facility]    In 5 months MD NICA Panda'Delvin Claire  Internal Medicine, [facility]                Passed - No ED/Hospital visits since last PCP visit     Last PCP Visit: 8/25/2021 Last Admission: 12/6/2016 Last ED Visit:           Passed - Cr is 1.39 or below and within 360 days     Lab Results   Component Value Date    CREATININE 0.7 08/18/2021    CREATININE 0.7 02/17/2021    CREATININE 0.7 03/16/2020              Passed - HBA1C within 180 days     Lab Results   Component Value Date    HGBA1C 5.6 02/23/2022    HGBA1C 5.5 08/18/2021    HGBA1C 5.3 02/17/2021              Passed - eGFR is 45 or above and within 360 days     Lab Results   Component Value Date    EGFRNONAA >60.0 08/18/2021    EGFRNONAA >60.0 02/17/2021    EGFRNONAA >60.0 03/16/2020                    Appointments  past 12m or future 3m with PCP    Date Provider   Last Visit   8/25/2021 Loli Erwin MD   Next Visit   9/7/2022 Loli Erwin MD   ED visits in past 90 days: 0     Note composed:3:51 PM 03/29/2022

## 2022-04-01 ENCOUNTER — TELEPHONE (OUTPATIENT)
Dept: ADMINISTRATIVE | Facility: HOSPITAL | Age: 77
End: 2022-04-01
Payer: MEDICARE

## 2022-04-01 DIAGNOSIS — Z12.11 COLON CANCER SCREENING: Primary | ICD-10-CM

## 2022-04-01 DIAGNOSIS — Z86.010 HISTORY OF COLON POLYPS: ICD-10-CM

## 2022-04-06 ENCOUNTER — OFFICE VISIT (OUTPATIENT)
Dept: INTERNAL MEDICINE | Facility: CLINIC | Age: 77
End: 2022-04-06
Payer: MEDICARE

## 2022-04-06 ENCOUNTER — HOSPITAL ENCOUNTER (OUTPATIENT)
Dept: RADIOLOGY | Facility: HOSPITAL | Age: 77
Discharge: HOME | End: 2022-04-06
Payer: MEDICARE

## 2022-04-06 VITALS
SYSTOLIC BLOOD PRESSURE: 128 MMHG | BODY MASS INDEX: 22.75 KG/M2 | OXYGEN SATURATION: 94 % | DIASTOLIC BLOOD PRESSURE: 64 MMHG | HEART RATE: 103 BPM | HEIGHT: 65 IN | TEMPERATURE: 99 F | WEIGHT: 136.56 LBS

## 2022-04-06 DIAGNOSIS — J40 BRONCHITIS: Primary | ICD-10-CM

## 2022-04-06 DIAGNOSIS — R06.02 SHORTNESS OF BREATH: ICD-10-CM

## 2022-04-06 DIAGNOSIS — R05.9 COUGH: ICD-10-CM

## 2022-04-06 DIAGNOSIS — J40 BRONCHITIS: ICD-10-CM

## 2022-04-06 LAB
CTP QC/QA: YES
SARS-COV-2 RDRP RESP QL NAA+PROBE: NEGATIVE

## 2022-04-06 PROCEDURE — 71046 X-RAY EXAM CHEST 2 VIEWS: CPT | Mod: 26,,, | Performed by: RADIOLOGY

## 2022-04-06 PROCEDURE — 3078F DIAST BP <80 MM HG: CPT | Mod: CPTII,S$GLB,,

## 2022-04-06 PROCEDURE — 3288F FALL RISK ASSESSMENT DOCD: CPT | Mod: CPTII,S$GLB,,

## 2022-04-06 PROCEDURE — 71046 XR CHEST PA AND LATERAL: ICD-10-PCS | Mod: 26,,, | Performed by: RADIOLOGY

## 2022-04-06 PROCEDURE — 99999 PR PBB SHADOW E&M-EST. PATIENT-LVL V: CPT | Mod: PBBFAC,,,

## 2022-04-06 PROCEDURE — U0002 COVID-19 LAB TEST NON-CDC: HCPCS | Mod: QW,S$GLB,,

## 2022-04-06 PROCEDURE — U0002: ICD-10-PCS | Mod: QW,S$GLB,,

## 2022-04-06 PROCEDURE — 1160F RVW MEDS BY RX/DR IN RCRD: CPT | Mod: CPTII,S$GLB,,

## 2022-04-06 PROCEDURE — 1159F PR MEDICATION LIST DOCUMENTED IN MEDICAL RECORD: ICD-10-PCS | Mod: CPTII,S$GLB,,

## 2022-04-06 PROCEDURE — 1101F PR PT FALLS ASSESS DOC 0-1 FALLS W/OUT INJ PAST YR: ICD-10-PCS | Mod: CPTII,S$GLB,,

## 2022-04-06 PROCEDURE — 99999 PR PBB SHADOW E&M-EST. PATIENT-LVL V: ICD-10-PCS | Mod: PBBFAC,,,

## 2022-04-06 PROCEDURE — 99214 OFFICE O/P EST MOD 30 MIN: CPT | Mod: S$GLB,,,

## 2022-04-06 PROCEDURE — 1126F PR PAIN SEVERITY QUANTIFIED, NO PAIN PRESENT: ICD-10-PCS | Mod: CPTII,S$GLB,,

## 2022-04-06 PROCEDURE — 3288F PR FALLS RISK ASSESSMENT DOCUMENTED: ICD-10-PCS | Mod: CPTII,S$GLB,,

## 2022-04-06 PROCEDURE — 1126F AMNT PAIN NOTED NONE PRSNT: CPT | Mod: CPTII,S$GLB,,

## 2022-04-06 PROCEDURE — 99214 PR OFFICE/OUTPT VISIT, EST, LEVL IV, 30-39 MIN: ICD-10-PCS | Mod: S$GLB,,,

## 2022-04-06 PROCEDURE — 1160F PR REVIEW ALL MEDS BY PRESCRIBER/CLIN PHARMACIST DOCUMENTED: ICD-10-PCS | Mod: CPTII,S$GLB,,

## 2022-04-06 PROCEDURE — 3078F PR MOST RECENT DIASTOLIC BLOOD PRESSURE < 80 MM HG: ICD-10-PCS | Mod: CPTII,S$GLB,,

## 2022-04-06 PROCEDURE — 71046 X-RAY EXAM CHEST 2 VIEWS: CPT | Mod: TC

## 2022-04-06 PROCEDURE — 1159F MED LIST DOCD IN RCRD: CPT | Mod: CPTII,S$GLB,,

## 2022-04-06 PROCEDURE — 3074F PR MOST RECENT SYSTOLIC BLOOD PRESSURE < 130 MM HG: ICD-10-PCS | Mod: CPTII,S$GLB,,

## 2022-04-06 PROCEDURE — 3074F SYST BP LT 130 MM HG: CPT | Mod: CPTII,S$GLB,,

## 2022-04-06 PROCEDURE — 1101F PT FALLS ASSESS-DOCD LE1/YR: CPT | Mod: CPTII,S$GLB,,

## 2022-04-06 RX ORDER — FLUTICASONE PROPIONATE 50 MCG
SPRAY, SUSPENSION (ML) NASAL
Qty: 15.8 ML | Refills: 2 | Status: SHIPPED | OUTPATIENT
Start: 2022-04-06

## 2022-04-06 RX ORDER — METHYLPREDNISOLONE 4 MG/1
TABLET ORAL
Qty: 21 EACH | Refills: 0 | Status: SHIPPED | OUTPATIENT
Start: 2022-04-06 | End: 2022-04-27

## 2022-04-06 RX ORDER — AZITHROMYCIN 250 MG/1
TABLET, FILM COATED ORAL
Qty: 6 TABLET | Refills: 0 | Status: SHIPPED | OUTPATIENT
Start: 2022-04-06 | End: 2022-04-11

## 2022-04-06 RX ORDER — ALBUTEROL SULFATE 90 UG/1
2 AEROSOL, METERED RESPIRATORY (INHALATION) EVERY 6 HOURS PRN
Qty: 18 G | Refills: 0 | Status: SHIPPED | OUTPATIENT
Start: 2022-04-06 | End: 2022-09-07

## 2022-04-06 RX ORDER — LEVOCETIRIZINE DIHYDROCHLORIDE 5 MG/1
5 TABLET, FILM COATED ORAL NIGHTLY
Qty: 30 TABLET | Refills: 11 | Status: SHIPPED | OUTPATIENT
Start: 2022-04-06 | End: 2023-10-18

## 2022-04-06 RX ORDER — PROMETHAZINE HYDROCHLORIDE AND PHENYLEPHRINE HYDROCHLORIDE 6.25; 5 MG/5ML; MG/5ML
5 SYRUP ORAL EVERY 6 HOURS PRN
Qty: 118 ML | Refills: 0 | Status: SHIPPED | OUTPATIENT
Start: 2022-04-06 | End: 2022-04-16

## 2022-04-06 NOTE — PROGRESS NOTES
Connie Fields  04/06/2022  [id number]    Loli Erwin MD  Patient Care Team:  Loli Erwin MD as PCP - General (Family Medicine)  Juventino Higuera MD as Consulting Physician (Ophthalmology)  Edilia Vasquez LCSW as  (Psychiatry)  Tito Anderson, PENNY as Consulting Physician (Optometry)  Has the patient seen any provider outside of the [facility] network since the last visit? (no). If yes, HIPPA forms completed and records requested.        Visit Type:an urgent visit for a new problem    Chief Complaint:  Chief Complaint   Patient presents with    Sinus Problem       History of Present Illness:    She is been sick since Saturday  She is congested  Coughing   Runny nose  She feels it going down her throat  She has tried Theraflu and Vicks  She is using throat spray    She jocelyne like CXR and COVID vaccine    History:  Past Medical History:   Diagnosis Date    Anxiety     Anxiety     Cataract (lens) fragments in eye following cataract surgery, bilateral     DM (diabetes mellitus) 2013     10/21/2018    DM (diabetes mellitus) 2013     am 12/09/2019    DM2 (diabetes mellitus, type 2) 01/13    GERD (gastroesophageal reflux disease)     Hiatal hernia     Mild nonproliferative diabetic retinopathy of both eyes 1/22/15    OS>OD    Mild nonproliferative diabetic retinopathy of both eyes 1/22/2015    OS>OD     Osteoarthritis      Past Surgical History:   Procedure Laterality Date    AUGMENTATION OF BREAST      saline    BREAST SURGERY Bilateral 1992    saline implants    BUNIONECTOMY      CATARACT EXTRACTION W/  INTRAOCULAR LENS IMPLANT Bilateral     12/12/18 OD, 1/9/19 OS, Dr Higuera    CHOLECYSTECTOMY  05/12    COLONOSCOPY  08    1 POLYP    COLONOSCOPY N/A 12/6/2016    Procedure: COLONOSCOPY with biopsies;  Surgeon: Sarah Schmidt MD;  [facility];  Service: Endoscopy;  Laterality: N/A;    PCIOL  Right 12/12/2018    DR. HIGUERA    TONSILLECTOMY,  ADENOIDECTOMY       Family History   Problem Relation Age of Onset    Heart failure Father     Suicide Father     Diabetes Mother     Heart failure Mother     Hypertension Mother     Stroke Mother     Heart failure Brother     Cancer Brother         full body    Hypertension Brother     Coronary artery disease Brother     Diabetes Maternal Grandmother     Hypertension Son     Suicide Son      Social History     Socioeconomic History    Marital status:    Tobacco Use    Smoking status: Never Smoker    Smokeless tobacco: Never Used   Substance and Sexual Activity    Alcohol use: Yes     Comment: occasionally    Drug use: No    Sexual activity: Yes     Partners: Male     Patient Active Problem List   Diagnosis    Generalized anxiety disorder    History of diabetic retinopathy Mild nonproliferative diabetic retinopathy of both eyes (None on eye exam 11/26/2018)    Mixed stress and urge urinary incontinence    Controlled type 2 diabetes mellitus with complication, without long-term current use of insulin    Nuclear sclerosis of both eyes    Diabetes mellitus type 2 without retinopathy    Hyperlipidemia associated with type 2 diabetes mellitus    Primary snoring    Nocturnal hypoxemia    Major depressive disorder, recurrent episode, moderate     Review of patient's allergies indicates:   Allergen Reactions    Hydrocodone Nausea And Vomiting     Once only - on empty stomach       The following were reviewed at this visit: active problem list, medication list, allergies, family history, social history, and health maintenance.    Medications:  Current Outpatient Medications on File Prior to Visit   Medication Sig Dispense Refill    aspirin (ECOTRIN) 81 MG EC tablet Take 81 mg by mouth once daily.      blood sugar diagnostic (BLOOD GLUCOSE TEST) Strp 1 each by Misc.(Non-Drug; Combo Route) route once daily. One touch verio  each 4    blood-glucose meter kit Use as instructed 1 each  0    buPROPion (WELLBUTRIN XL) 150 MG TB24 tablet Take 1 tablet (150 mg total) by mouth once daily. 90 tablet 3    cholecalciferol, vitamin D3, (VITAMIN D3) 25 mcg (1,000 unit) capsule Take 1,000 Units by mouth once daily.      clonazePAM (KLONOPIN) 0.5 MG tablet 1/2 to 1 up to BID prn severe anxiety 30 tablet 0    cyanocobalamin (VITAMIN B-12) 1000 MCG tablet Take 100 mcg by mouth once daily. Taking a B-complex      famotidine (PEPCID) 40 MG tablet TAKE 1 TABLET EVERY DAY 90 tablet 1    fish oil-omega-3 fatty acids 300-1,000 mg capsule Take 2 g by mouth once daily.      fluticasone propionate (FLONASE) 50 mcg/actuation nasal spray 1 spray in each nostril      lancets (ONE TOUCH DELICA LANCETS) 33 gauge Misc 1 lancet by Misc.(Non-Drug; Combo Route) route 2 (two) times daily. 200 each 3    lysine 500 mg Tab Take 500 mg by mouth once daily.      metFORMIN (GLUCOPHAGE-XR) 500 MG ER 24hr tablet TAKE 2 TABLETS TWICE DAILY 360 tablet 1    montelukast (SINGULAIR) 10 mg tablet 1 tablet in the evening      multivitamin capsule Take 1 capsule by mouth once daily.      omeprazole (PRILOSEC) 20 MG capsule Take 20 mg by mouth once daily.      oxybutynin (DITROPAN) 5 MG Tab Take 1 tablet (5 mg total) by mouth 2 (two) times daily. 180 tablet 3    paroxetine (PAXIL) 30 MG tablet Take 1 tablet (30 mg total) by mouth every morning. 90 tablet 1    PHENAZOPYRIDINE HCL (AZO ORAL) Take 1 tablet by mouth daily as needed.       pravastatin (PRAVACHOL) 40 MG tablet TAKE 1 TABLET EVERY DAY 90 tablet 3    traZODone (DESYREL) 50 MG tablet Take 1 tablet (50 mg total) by mouth every evening. 90 tablet 3     No current facility-administered medications on file prior to visit.       Medications have been reviewed and reconciled with patient at this visit.  Barriers to medications reviewed with patient.    Adverse reactions to current medications reviewed with patient..    Over the counter medications reviewed and reconciled with  patient.    Exam:  Wt Readings from Last 3 Encounters:   03/02/22 62.5 kg (137 lb 14.4 oz)   12/15/21 64 kg (141 lb 1.5 oz)   11/15/21 64 kg (141 lb 1.5 oz)     Temp Readings from Last 3 Encounters:   03/02/22 99 °F (37.2 °C) (Tympanic)   11/15/21 96.4 °F (35.8 °C) (Tympanic)   08/25/21 97.7 °F (36.5 °C)     BP Readings from Last 3 Encounters:   03/02/22 130/72   11/15/21 122/70   08/25/21 124/62     Pulse Readings from Last 3 Encounters:   03/02/22 108   11/15/21 93   08/25/21 101     There is no height or weight on file to calculate BMI.      Review of Systems   Constitutional: Negative.  Negative for chills and fever.   HENT: Positive for congestion and sinus pain. Negative for sore throat.    Eyes: Negative for blurred vision and double vision.   Respiratory: Positive for cough and shortness of breath. Negative for sputum production and wheezing.    Cardiovascular: Negative for chest pain, palpitations and leg swelling.   Gastrointestinal: Negative for abdominal pain, constipation, diarrhea, heartburn, nausea and vomiting.   Genitourinary: Negative.    Musculoskeletal: Negative.    Skin: Negative.  Negative for rash.   Neurological: Negative.    Endo/Heme/Allergies: Negative.  Negative for polydipsia. Does not bruise/bleed easily.   Psychiatric/Behavioral: Negative for depression and substance abuse.     Physical Exam  Vitals and nursing note reviewed.   Constitutional:       General: She is not in acute distress.     Appearance: She is well-developed. She is not diaphoretic.   HENT:      Head: Normocephalic and atraumatic.      Right Ear: Tympanic membrane and external ear normal.      Left Ear: Tympanic membrane and external ear normal.      Nose: Congestion and rhinorrhea present.      Mouth/Throat:      Pharynx: Posterior oropharyngeal erythema present.   Eyes:      General:         Right eye: No discharge.         Left eye: No discharge.      Conjunctiva/sclera: Conjunctivae normal.      Pupils: Pupils are  equal, round, and reactive to light.   Neck:      Thyroid: No thyromegaly.   Cardiovascular:      Rate and Rhythm: Normal rate and regular rhythm.      Heart sounds: Normal heart sounds. No murmur heard.  Pulmonary:      Effort: No respiratory distress.      Breath sounds: Examination of the right-upper field reveals rales. Examination of the left-upper field reveals rales. Examination of the right-lower field reveals rales. Examination of the left-lower field reveals rales. Rales present. No wheezing.   Abdominal:      General: Bowel sounds are normal.   Musculoskeletal:         General: Normal range of motion.      Cervical back: Normal range of motion and neck supple.   Lymphadenopathy:      Cervical: No cervical adenopathy.   Skin:     General: Skin is warm and dry.      Capillary Refill: Capillary refill takes less than 2 seconds.   Neurological:      Mental Status: She is alert and oriented to person, place, and time.      Cranial Nerves: No cranial nerve deficit.   Psychiatric:         Behavior: Behavior normal.         Thought Content: Thought content normal.         Judgment: Judgment normal.         Laboratory Reviewed ({Yes)  Lab Results   Component Value Date    WBC 7.91 08/18/2020    HGB 13.4 08/18/2020    HCT 41.5 08/18/2020     08/18/2020    CHOL 130 08/18/2021    TRIG 74 08/18/2021    HDL 54 08/18/2021    ALT 20 08/18/2021    AST 18 08/18/2021     08/18/2021    K 4.0 08/18/2021     08/18/2021    CREATININE 0.7 08/18/2021    BUN 17 08/18/2021    CO2 32 (H) 08/18/2021    HGBA1C 5.6 02/23/2022       Connie was seen today for sinus problem.    Diagnoses and all orders for this visit:    Bronchitis  -     X-Ray Chest PA And Lateral; Future  -     methylPREDNISolone (MEDROL DOSEPACK) 4 mg tablet; use as directed  -     azithromycin (Z-KEITH) 250 MG tablet; Take 2 tablets by mouth on day 1; Take 1 tablet by mouth on days 2-5  -     promethazine-phenylephrine (PROMETHAZINE VC) 6.25-5 mg/5  mL syrup; Take 5 mLs by mouth every 6 (six) hours as needed (cough).  -     POCT COVID-19 Rapid Screening  -     albuterol (PROAIR HFA) 90 mcg/actuation inhaler; Inhale 2 puffs into the lungs every 6 (six) hours as needed for Wheezing or Shortness of Breath. Rescue  -     fluticasone propionate (FLONASE) 50 mcg/actuation nasal spray; 1 spray in each nostril  -     levocetirizine (XYZAL) 5 MG tablet; Take 1 tablet (5 mg total) by mouth every evening.    URI  -     X-Ray Chest PA And Lateral; Future  -     methylPREDNISolone (MEDROL DOSEPACK) 4 mg tablet; use as directed  -     azithromycin (Z-KEITH) 250 MG tablet; Take 2 tablets by mouth on day 1; Take 1 tablet by mouth on days 2-5  -     promethazine-phenylephrine (PROMETHAZINE VC) 6.25-5 mg/5 mL syrup; Take 5 mLs by mouth every 6 (six) hours as needed (cough).  -     POCT COVID-19 Rapid Screening  -     albuterol (PROAIR HFA) 90 mcg/actuation inhaler; Inhale 2 puffs into the lungs every 6 (six) hours as needed for Wheezing or Shortness of Breath. Rescue  -     fluticasone propionate (FLONASE) 50 mcg/actuation nasal spray; 1 spray in each nostril  -     levocetirizine (XYZAL) 5 MG tablet; Take 1 tablet (5 mg total) by mouth every evening.    Shortness of breath  -     X-Ray Chest PA And Lateral; Future  -     POCT COVID-19 Rapid Screening  -     albuterol (PROAIR HFA) 90 mcg/actuation inhaler; Inhale 2 puffs into the lungs every 6 (six) hours as needed for Wheezing or Shortness of Breath. Rescue        Care Plan/Goals: Reviewed    Goals    None         Follow up: No follow-ups on file.    After visit summary was printed and given to patient upon discharge today.  Patient goals and care plan are included in After Visit Summary.

## 2022-05-19 ENCOUNTER — PES CALL (OUTPATIENT)
Dept: ADMINISTRATIVE | Facility: CLINIC | Age: 77
End: 2022-05-19
Payer: MEDICARE

## 2022-06-01 ENCOUNTER — TELEPHONE (OUTPATIENT)
Dept: INTERNAL MEDICINE | Facility: CLINIC | Age: 77
End: 2022-06-01
Payer: MEDICARE

## 2022-06-01 NOTE — TELEPHONE ENCOUNTER
Annual is due in august, not June  She had her labs last done in Feb with Nery campos.    I would move the appt and labs to August  All the labs are in there for what is due in August

## 2022-06-01 NOTE — TELEPHONE ENCOUNTER
----- Message from Aniyah Echeverria sent at 6/1/2022  8:34 AM CDT -----  Regarding: labs  Contact: patient  Patient requesting lab orders for her appt on 6/15/22, please call her back at [phone]

## 2022-06-01 NOTE — TELEPHONE ENCOUNTER
Patient wants her annual labs ordered before her appointment on 06/15/2022. Advised patient that I will route this message to Dr. Erwin and we will be in touch with her to schedule her labs. Verbalized understanding.

## 2022-06-09 DIAGNOSIS — E78.5 HYPERLIPIDEMIA ASSOCIATED WITH TYPE 2 DIABETES MELLITUS: ICD-10-CM

## 2022-06-09 DIAGNOSIS — E11.69 HYPERLIPIDEMIA ASSOCIATED WITH TYPE 2 DIABETES MELLITUS: ICD-10-CM

## 2022-06-09 DIAGNOSIS — E78.2 MIXED HYPERLIPIDEMIA: ICD-10-CM

## 2022-06-09 RX ORDER — PRAVASTATIN SODIUM 40 MG/1
TABLET ORAL
Refills: 0 | OUTPATIENT
Start: 2022-06-09

## 2022-06-09 NOTE — TELEPHONE ENCOUNTER
Care Due:                  Date            Visit Type   Department     Provider  --------------------------------------------------------------------------------                                EP -                              PRIMARY      [facility] INTERNAL  Last Visit: 08-      [facility] ([facility]   TENZIN Erwin                              EP -                              PRIMARY      [facility] INTERNAL  Next Visit: 09-      [facility]   TENZIN Erwin                                                            Last  Test          Frequency    Reason                     Performed    Due Date  --------------------------------------------------------------------------------    CMP.........  12 months..  metFORMIN, pravastatin...  08- 08-    HBA1C.......  6 months...  metFORMIN................  02- 08-    Lipid Panel.  12 months..  pravastatin..............  08- 08-    [facility] Embedded Care Gaps. Reference number: [id number]. 6/09/2022   2:44:51 PM CDT

## 2022-06-09 NOTE — TELEPHONE ENCOUNTER
Quick DC. Inappropriate Request    Refill Authorization Note   Connie Fields  is requesting a refill authorization.  Brief Assessment and Rationale for Refill:  Quick Discontinue  Medication Therapy Plan:  no sig    Medication Reconciliation Completed:  No      Comments:     Note composed:3:04 PM 06/09/2022

## 2022-06-10 RX ORDER — PRAVASTATIN SODIUM 40 MG/1
40 TABLET ORAL DAILY
Qty: 90 TABLET | Refills: 3 | Status: SHIPPED | OUTPATIENT
Start: 2022-06-10 | End: 2023-05-01

## 2022-07-15 ENCOUNTER — IMMUNIZATION (OUTPATIENT)
Dept: PRIMARY CARE CLINIC | Facility: CLINIC | Age: 77
End: 2022-07-15
Payer: MEDICARE

## 2022-07-15 DIAGNOSIS — Z23 NEED FOR VACCINATION: Primary | ICD-10-CM

## 2022-07-15 PROCEDURE — 0054A COVID-19, MRNA, LNP-S, PF, 30 MCG/0.3 ML DOSE VACCINE (PFIZER): CPT | Mod: CV19,PBBFAC | Performed by: FAMILY MEDICINE

## 2022-08-10 ENCOUNTER — TELEPHONE (OUTPATIENT)
Dept: PREADMISSION TESTING | Facility: HOSPITAL | Age: 77
End: 2022-08-10
Payer: MEDICARE

## 2022-08-19 ENCOUNTER — LAB VISIT (OUTPATIENT)
Dept: LAB | Facility: HOSPITAL | Age: 77
End: 2022-08-19
Payer: MEDICARE

## 2022-08-19 DIAGNOSIS — E11.69 HYPERLIPIDEMIA ASSOCIATED WITH TYPE 2 DIABETES MELLITUS: ICD-10-CM

## 2022-08-19 DIAGNOSIS — E11.8 CONTROLLED TYPE 2 DIABETES MELLITUS WITH COMPLICATION, WITHOUT LONG-TERM CURRENT USE OF INSULIN: ICD-10-CM

## 2022-08-19 DIAGNOSIS — E78.5 HYPERLIPIDEMIA ASSOCIATED WITH TYPE 2 DIABETES MELLITUS: ICD-10-CM

## 2022-08-19 LAB
ALBUMIN SERPL BCP-MCNC: 4 G/DL (ref 3.5–5.2)
ALP SERPL-CCNC: 52 U/L (ref 55–135)
ALT SERPL W/O P-5'-P-CCNC: 14 U/L (ref 10–44)
ANION GAP SERPL CALC-SCNC: 5 MMOL/L (ref 8–16)
AST SERPL-CCNC: 17 U/L (ref 10–40)
BASOPHILS # BLD AUTO: 0.05 K/UL (ref 0–0.2)
BASOPHILS NFR BLD: 0.9 % (ref 0–1.9)
BILIRUB SERPL-MCNC: 1 MG/DL (ref 0.1–1)
BUN SERPL-MCNC: 12 MG/DL (ref 8–23)
CALCIUM SERPL-MCNC: 9.2 MG/DL (ref 8.7–10.5)
CHLORIDE SERPL-SCNC: 105 MMOL/L (ref 95–110)
CHOLEST SERPL-MCNC: 107 MG/DL (ref 120–199)
CHOLEST/HDLC SERPL: 2 {RATIO} (ref 2–5)
CO2 SERPL-SCNC: 29 MMOL/L (ref 23–29)
CREAT SERPL-MCNC: 0.6 MG/DL (ref 0.5–1.4)
DIFFERENTIAL METHOD: NORMAL
EOSINOPHIL # BLD AUTO: 0.2 K/UL (ref 0–0.5)
EOSINOPHIL NFR BLD: 2.9 % (ref 0–8)
ERYTHROCYTE [DISTWIDTH] IN BLOOD BY AUTOMATED COUNT: 12.5 % (ref 11.5–14.5)
EST. GFR  (NO RACE VARIABLE): >60 ML/MIN/1.73 M^2
ESTIMATED AVG GLUCOSE: 108 MG/DL (ref 68–131)
GLUCOSE SERPL-MCNC: 93 MG/DL (ref 70–110)
HBA1C MFR BLD: 5.4 % (ref 4–5.6)
HCT VFR BLD AUTO: 39.9 % (ref 37–48.5)
HDLC SERPL-MCNC: 54 MG/DL (ref 40–75)
HDLC SERPL: 50.5 % (ref 20–50)
HGB BLD-MCNC: 13.4 G/DL (ref 12–16)
IMM GRANULOCYTES # BLD AUTO: 0.01 K/UL (ref 0–0.04)
IMM GRANULOCYTES NFR BLD AUTO: 0.2 % (ref 0–0.5)
LDLC SERPL CALC-MCNC: 43.4 MG/DL (ref 63–159)
LYMPHOCYTES # BLD AUTO: 1.9 K/UL (ref 1–4.8)
LYMPHOCYTES NFR BLD: 34.6 % (ref 18–48)
MCH RBC QN AUTO: 28.6 PG (ref 27–31)
MCHC RBC AUTO-ENTMCNC: 33.6 G/DL (ref 32–36)
MCV RBC AUTO: 85 FL (ref 82–98)
MONOCYTES # BLD AUTO: 0.5 K/UL (ref 0.3–1)
MONOCYTES NFR BLD: 9.6 % (ref 4–15)
NEUTROPHILS # BLD AUTO: 2.9 K/UL (ref 1.8–7.7)
NEUTROPHILS NFR BLD: 51.8 % (ref 38–73)
NONHDLC SERPL-MCNC: 53 MG/DL
NRBC BLD-RTO: 0 /100 WBC
PLATELET # BLD AUTO: 259 K/UL (ref 150–450)
PMV BLD AUTO: 9.4 FL (ref 9.2–12.9)
POTASSIUM SERPL-SCNC: 4.2 MMOL/L (ref 3.5–5.1)
PROT SERPL-MCNC: 6.3 G/DL (ref 6–8.4)
RBC # BLD AUTO: 4.69 M/UL (ref 4–5.4)
SODIUM SERPL-SCNC: 139 MMOL/L (ref 136–145)
TRIGL SERPL-MCNC: 48 MG/DL (ref 30–150)
WBC # BLD AUTO: 5.6 K/UL (ref 3.9–12.7)

## 2022-08-19 PROCEDURE — 85025 COMPLETE CBC W/AUTO DIFF WBC: CPT

## 2022-08-19 PROCEDURE — 36415 COLL VENOUS BLD VENIPUNCTURE: CPT

## 2022-08-19 PROCEDURE — 80053 COMPREHEN METABOLIC PANEL: CPT

## 2022-08-19 PROCEDURE — 83036 HEMOGLOBIN GLYCOSYLATED A1C: CPT

## 2022-08-19 PROCEDURE — 80061 LIPID PANEL: CPT

## 2022-09-07 ENCOUNTER — OFFICE VISIT (OUTPATIENT)
Dept: INTERNAL MEDICINE | Facility: CLINIC | Age: 77
End: 2022-09-07
Payer: MEDICARE

## 2022-09-07 ENCOUNTER — HOSPITAL ENCOUNTER (OUTPATIENT)
Dept: PREADMISSION TESTING | Facility: HOSPITAL | Age: 77
Discharge: HOME | End: 2022-09-07
Attending: FAMILY MEDICINE
Payer: MEDICARE

## 2022-09-07 VITALS
WEIGHT: 138.31 LBS | TEMPERATURE: 98 F | BODY MASS INDEX: 23.04 KG/M2 | HEART RATE: 93 BPM | DIASTOLIC BLOOD PRESSURE: 64 MMHG | HEIGHT: 65 IN | SYSTOLIC BLOOD PRESSURE: 110 MMHG | OXYGEN SATURATION: 95 %

## 2022-09-07 DIAGNOSIS — N39.46 MIXED STRESS AND URGE URINARY INCONTINENCE: ICD-10-CM

## 2022-09-07 DIAGNOSIS — Z12.11 SCREEN FOR COLON CANCER: ICD-10-CM

## 2022-09-07 DIAGNOSIS — E78.5 HYPERLIPIDEMIA ASSOCIATED WITH TYPE 2 DIABETES MELLITUS: ICD-10-CM

## 2022-09-07 DIAGNOSIS — Z12.11 SCREEN FOR COLON CANCER: Primary | ICD-10-CM

## 2022-09-07 DIAGNOSIS — E11.8 CONTROLLED TYPE 2 DIABETES MELLITUS WITH COMPLICATION, WITHOUT LONG-TERM CURRENT USE OF INSULIN: Primary | ICD-10-CM

## 2022-09-07 DIAGNOSIS — E11.69 HYPERLIPIDEMIA ASSOCIATED WITH TYPE 2 DIABETES MELLITUS: ICD-10-CM

## 2022-09-07 PROCEDURE — 1126F AMNT PAIN NOTED NONE PRSNT: CPT | Mod: CPTII,S$GLB,, | Performed by: FAMILY MEDICINE

## 2022-09-07 PROCEDURE — 3078F PR MOST RECENT DIASTOLIC BLOOD PRESSURE < 80 MM HG: ICD-10-PCS | Mod: CPTII,S$GLB,, | Performed by: FAMILY MEDICINE

## 2022-09-07 PROCEDURE — 3074F SYST BP LT 130 MM HG: CPT | Mod: CPTII,S$GLB,, | Performed by: FAMILY MEDICINE

## 2022-09-07 PROCEDURE — 1159F PR MEDICATION LIST DOCUMENTED IN MEDICAL RECORD: ICD-10-PCS | Mod: CPTII,S$GLB,, | Performed by: FAMILY MEDICINE

## 2022-09-07 PROCEDURE — 1160F PR REVIEW ALL MEDS BY PRESCRIBER/CLIN PHARMACIST DOCUMENTED: ICD-10-PCS | Mod: CPTII,S$GLB,, | Performed by: FAMILY MEDICINE

## 2022-09-07 PROCEDURE — 99397 PR PREVENTIVE VISIT,EST,65 & OVER: ICD-10-PCS | Mod: S$GLB,,, | Performed by: FAMILY MEDICINE

## 2022-09-07 PROCEDURE — 3288F PR FALLS RISK ASSESSMENT DOCUMENTED: ICD-10-PCS | Mod: CPTII,S$GLB,, | Performed by: FAMILY MEDICINE

## 2022-09-07 PROCEDURE — 99397 PER PM REEVAL EST PAT 65+ YR: CPT | Mod: S$GLB,,, | Performed by: FAMILY MEDICINE

## 2022-09-07 PROCEDURE — 3074F PR MOST RECENT SYSTOLIC BLOOD PRESSURE < 130 MM HG: ICD-10-PCS | Mod: CPTII,S$GLB,, | Performed by: FAMILY MEDICINE

## 2022-09-07 PROCEDURE — 1159F MED LIST DOCD IN RCRD: CPT | Mod: CPTII,S$GLB,, | Performed by: FAMILY MEDICINE

## 2022-09-07 PROCEDURE — 3288F FALL RISK ASSESSMENT DOCD: CPT | Mod: CPTII,S$GLB,, | Performed by: FAMILY MEDICINE

## 2022-09-07 PROCEDURE — 1160F RVW MEDS BY RX/DR IN RCRD: CPT | Mod: CPTII,S$GLB,, | Performed by: FAMILY MEDICINE

## 2022-09-07 PROCEDURE — 1101F PT FALLS ASSESS-DOCD LE1/YR: CPT | Mod: CPTII,S$GLB,, | Performed by: FAMILY MEDICINE

## 2022-09-07 PROCEDURE — 99999 PR PBB SHADOW E&M-EST. PATIENT-LVL V: CPT | Mod: PBBFAC,,, | Performed by: FAMILY MEDICINE

## 2022-09-07 PROCEDURE — 3078F DIAST BP <80 MM HG: CPT | Mod: CPTII,S$GLB,, | Performed by: FAMILY MEDICINE

## 2022-09-07 PROCEDURE — 1126F PR PAIN SEVERITY QUANTIFIED, NO PAIN PRESENT: ICD-10-PCS | Mod: CPTII,S$GLB,, | Performed by: FAMILY MEDICINE

## 2022-09-07 PROCEDURE — 99999 PR PBB SHADOW E&M-EST. PATIENT-LVL V: ICD-10-PCS | Mod: PBBFAC,,, | Performed by: FAMILY MEDICINE

## 2022-09-07 PROCEDURE — 1101F PR PT FALLS ASSESS DOC 0-1 FALLS W/OUT INJ PAST YR: ICD-10-PCS | Mod: CPTII,S$GLB,, | Performed by: FAMILY MEDICINE

## 2022-09-07 RX ORDER — OXYBUTYNIN CHLORIDE 5 MG/1
5 TABLET ORAL 2 TIMES DAILY
Qty: 180 TABLET | Refills: 3 | Status: SHIPPED | OUTPATIENT
Start: 2022-09-07 | End: 2023-09-27

## 2022-09-07 NOTE — PROGRESS NOTES
Connie Fields  09/07/2022  [id number]    Loli Erwin MD  Patient Care Team:  Loli Erwin MD as PCP - General (Family Medicine)  Juventino Higuera MD as Consulting Physician (Ophthalmology)  JOAQUÍN WellsW as  (Psychiatry)  Tito Anderson, PENNY as Consulting Physician (Optometry)          Visit Type:a scheduled routine follow-up visit    Chief Complaint:  Chief Complaint   Patient presents with    Annual Exam         History of Present Illness:  77 year old  Here for follow up    Dm.  Lab Results   Component Value Date    HGBA1C 5.4 08/19/2022     She did labs end of August  Lipids are god  A1c is good  On metformin    She is due for colon        History:  Past Medical History:   Diagnosis Date    Anxiety     Anxiety     Cataract (lens) fragments in eye following cataract surgery, bilateral     DM (diabetes mellitus) 2013     10/21/2018    DM (diabetes mellitus) 2013     am 12/09/2019    DM2 (diabetes mellitus, type 2) 01/13    GERD (gastroesophageal reflux disease)     Hiatal hernia     Mild nonproliferative diabetic retinopathy of both eyes 1/22/15    OS>OD    Mild nonproliferative diabetic retinopathy of both eyes 1/22/2015    OS>OD     Osteoarthritis      Past Surgical History:   Procedure Laterality Date    AUGMENTATION OF BREAST      saline    BREAST SURGERY Bilateral 1992    saline implants    BUNIONECTOMY      CATARACT EXTRACTION W/  INTRAOCULAR LENS IMPLANT Bilateral     12/12/18 OD, 1/9/19 OS, Dr Higuera    CHOLECYSTECTOMY  05/12    COLONOSCOPY  08    1 POLYP    COLONOSCOPY N/A 12/6/2016    Procedure: COLONOSCOPY with biopsies;  Surgeon: Sarah Schmidt MD;  Location: [facility];  Service: Endoscopy;  Laterality: N/A;    PCIOL  Right 12/12/2018    DR. HIGUERA    TONSILLECTOMY, ADENOIDECTOMY       Family History   Problem Relation Age of Onset    Heart failure Father     Suicide Father     Diabetes Mother     Heart failure Mother     Hypertension  Mother     Stroke Mother     Heart failure Brother     Cancer Brother         full body    Hypertension Brother     Coronary artery disease Brother     Diabetes Maternal Grandmother     Hypertension Son     Suicide Son      Social History     Socioeconomic History    Marital status:    Tobacco Use    Smoking status: Never    Smokeless tobacco: Never   Substance and Sexual Activity    Alcohol use: Yes     Comment: occasionally    Drug use: No    Sexual activity: Yes     Partners: Male     Patient Active Problem List   Diagnosis    Generalized anxiety disorder    History of diabetic retinopathy Mild nonproliferative diabetic retinopathy of both eyes (None on eye exam 11/26/2018)    Mixed stress and urge urinary incontinence    Controlled type 2 diabetes mellitus with complication, without long-term current use of insulin    Nuclear sclerosis of both eyes    Diabetes mellitus type 2 without retinopathy    Hyperlipidemia associated with type 2 diabetes mellitus    Primary snoring    Nocturnal hypoxemia    Major depressive disorder, recurrent episode, moderate     Review of patient's allergies indicates:   Allergen Reactions    Hydrocodone Nausea And Vomiting     Once only - on empty stomach       The following were reviewed at this visit: active problem list, medication list, allergies, family history, social history, and health maintenance.    Medications:  Current Outpatient Medications on File Prior to Visit   Medication Sig Dispense Refill    aspirin (ECOTRIN) 81 MG EC tablet Take 81 mg by mouth once daily.      buPROPion (WELLBUTRIN XL) 150 MG TB24 tablet Take 1 tablet (150 mg total) by mouth once daily. 90 tablet 3    cholecalciferol, vitamin D3, (VITAMIN D3) 25 mcg (1,000 unit) capsule Take 1,000 Units by mouth once daily.      clonazePAM (KLONOPIN) 0.5 MG tablet 1/2 to 1 up to BID prn severe anxiety 30 tablet 0    cyanocobalamin (VITAMIN B-12) 1000 MCG tablet Take 100 mcg by mouth once daily. Taking a  B-complex      famotidine (PEPCID) 40 MG tablet TAKE 1 TABLET EVERY DAY 90 tablet 1    fish oil-omega-3 fatty acids 300-1,000 mg capsule Take 2 g by mouth once daily.      fluticasone propionate (FLONASE) 50 mcg/actuation nasal spray 1 spray in each nostril 15.8 mL 2    levocetirizine (XYZAL) 5 MG tablet Take 1 tablet (5 mg total) by mouth every evening. 30 tablet 11    lysine 500 mg Tab Take 500 mg by mouth once daily.      metFORMIN (GLUCOPHAGE-XR) 500 MG ER 24hr tablet TAKE 2 TABLETS TWICE DAILY 360 tablet 1    multivitamin capsule Take 1 capsule by mouth once daily.      omeprazole (PRILOSEC) 20 MG capsule Take 20 mg by mouth once daily.      paroxetine (PAXIL) 30 MG tablet TAKE 1 TABLET (30 MG TOTAL) BY MOUTH EVERY MORNING. 90 tablet 1    PHENAZOPYRIDINE HCL (AZO ORAL) Take 1 tablet by mouth daily as needed.       pravastatin (PRAVACHOL) 40 MG tablet Take 1 tablet (40 mg total) by mouth once daily. 90 tablet 3    traZODone (DESYREL) 50 MG tablet Take 1 tablet (50 mg total) by mouth every evening. 90 tablet 3    [DISCONTINUED] oxybutynin (DITROPAN) 5 MG Tab TAKE 1 TABLET TWICE DAILY 180 tablet 3    blood sugar diagnostic (BLOOD GLUCOSE TEST) Strp 1 each by Misc.(Non-Drug; Combo Route) route once daily. One touch verio IQ (Patient not taking: Reported on 9/7/2022) 100 each 4    blood-glucose meter kit Use as instructed (Patient not taking: Reported on 9/7/2022) 1 each 0    lancets (ONE TOUCH DELICA LANCETS) 33 gauge Misc 1 lancet by Misc.(Non-Drug; Combo Route) route 2 (two) times daily. (Patient not taking: Reported on 9/7/2022) 200 each 3    montelukast (SINGULAIR) 10 mg tablet 1 tablet in the evening      [DISCONTINUED] albuterol (PROAIR HFA) 90 mcg/actuation inhaler Inhale 2 puffs into the lungs every 6 (six) hours as needed for Wheezing or Shortness of Breath. Rescue (Patient not taking: Reported on 9/7/2022) 18 g 0     No current facility-administered medications on file prior to visit.       Medications  have been reviewed and reconciled with patient at this visit.  Barriers to medications reviewed with patient.    Adverse reactions to current medications reviewed with patient..    Over the counter medications reviewed and reconciled with patient.    Exam:  Wt Readings from Last 3 Encounters:   09/07/22 62.8 kg (138 lb 5.4 oz)   04/06/22 62 kg (136 lb 9.2 oz)   03/02/22 62.5 kg (137 lb 14.4 oz)     Temp Readings from Last 3 Encounters:   09/07/22 98.4 °F (36.9 °C) (Temporal)   04/06/22 98.8 °F (37.1 °C) (Temporal)   03/02/22 99 °F (37.2 °C) (Tympanic)     BP Readings from Last 3 Encounters:   09/07/22 110/64   04/06/22 128/64   03/02/22 130/72     Pulse Readings from Last 3 Encounters:   09/07/22 93   04/06/22 103   03/02/22 108     Body mass index is 23.02 kg/m².      Review of Systems   Constitutional: Negative.  Negative for chills and fever.   HENT: Negative.  Negative for congestion, sinus pain and sore throat.    Eyes:  Negative for blurred vision and double vision.   Respiratory:  Negative for cough, sputum production, shortness of breath and wheezing.    Cardiovascular:  Negative for chest pain, palpitations and leg swelling.   Gastrointestinal:  Negative for abdominal pain, constipation, diarrhea, heartburn, nausea and vomiting.   Genitourinary: Negative.    Musculoskeletal: Negative.    Skin: Negative.  Negative for rash.   Neurological: Negative.    Endo/Heme/Allergies: Negative.  Negative for polydipsia. Does not bruise/bleed easily.   Psychiatric/Behavioral:  Negative for depression and substance abuse.    Physical Exam  Nursing note reviewed.   Pulmonary:      Effort: Pulmonary effort is normal. No respiratory distress.   Neurological:      Mental Status: She is alert and oriented to person, place, and time.   Psychiatric:         Mood and Affect: Mood normal.         Behavior: Behavior normal.         Thought Content: Thought content normal.         Judgment: Judgment normal.     Protective Sensation  (w/ 10 gram monofilament):  Right: Intact  Left: Intact    Visual Inspection:  Normal -  Bilateral    Pedal Pulses:   Right: Present  Left: Present    Posterior tibialis:   Right:Present  Left: Present   Laboratory Reviewed ({Yes)  Lab Results   Component Value Date    WBC 5.60 08/19/2022    HGB 13.4 08/19/2022    HCT 39.9 08/19/2022     08/19/2022    CHOL 107 (L) 08/19/2022    TRIG 48 08/19/2022    HDL 54 08/19/2022    ALT 14 08/19/2022    AST 17 08/19/2022     08/19/2022    K 4.2 08/19/2022     08/19/2022    CREATININE 0.6 08/19/2022    BUN 12 08/19/2022    CO2 29 08/19/2022    HGBA1C 5.4 08/19/2022       Connie was seen today for annual exam.    Diagnoses and all orders for this visit:    Controlled type 2 diabetes mellitus with complication, without long-term current use of insulin.  Lab Results   Component Value Date    HGBA1C 5.4 08/19/2022      Hyperlipidemia associated with type 2 diabetes mellitus  Lab Results   Component Value Date    LDLCALC 43.4 (L) 08/19/2022       Screen for colon cancer  -     Ambulatory referral/consult to Endo Procedure ; Future              Care Plan/Goals: Reviewed    Goals    None         Follow up: No follow-ups on file.    After visit summary was printed and given to patient upon discharge today.  Patient goals and care plan are included in After Visit Summary.

## 2022-09-08 RX ORDER — SODIUM, POTASSIUM,MAG SULFATES 17.5-3.13G
1 SOLUTION, RECONSTITUTED, ORAL ORAL DAILY
Qty: 1 KIT | Refills: 0 | Status: SHIPPED | OUTPATIENT
Start: 2022-09-08 | End: 2022-09-10

## 2022-10-06 ENCOUNTER — TELEPHONE (OUTPATIENT)
Dept: PREADMISSION TESTING | Facility: HOSPITAL | Age: 77
End: 2022-10-06
Payer: MEDICARE

## 2022-10-06 NOTE — TELEPHONE ENCOUNTER
----- Message from Gricelda Medley LPN sent at 10/5/2022  2:30 PM CDT -----  Contact: self    ----- Message -----  From: Kin Macias  Sent: 10/5/2022  10:53 AM CDT  To: Colt Olsen    Pt is calling in regards to upcoming procedure. She wants to know when will her prep needed for surgery be ordered and available for her. Please give her a call back in regards to this concern. [phone]

## 2022-10-12 ENCOUNTER — PATIENT MESSAGE (OUTPATIENT)
Dept: ENDOSCOPY | Facility: HOSPITAL | Age: 77
End: 2022-10-12
Payer: MEDICARE

## 2022-10-13 ENCOUNTER — PATIENT MESSAGE (OUTPATIENT)
Dept: PREADMISSION TESTING | Facility: HOSPITAL | Age: 77
End: 2022-10-13
Payer: MEDICARE

## 2022-10-17 ENCOUNTER — TELEPHONE (OUTPATIENT)
Dept: PREADMISSION TESTING | Facility: HOSPITAL | Age: 77
End: 2022-10-17
Payer: MEDICARE

## 2022-10-17 NOTE — TELEPHONE ENCOUNTER
----- Message from Charley Schilling sent at 10/11/2022 12:46 PM CDT -----  Contact: Self/[phone]  Pt is calling in regards to colonoscopy that is scheduled for 10/19/22. Please give her a call back at [phone]. Thank you s/g

## 2022-10-18 ENCOUNTER — PATIENT MESSAGE (OUTPATIENT)
Dept: GASTROENTEROLOGY | Facility: CLINIC | Age: 77
End: 2022-10-18
Payer: MEDICARE

## 2022-10-18 ENCOUNTER — NURSE TRIAGE (OUTPATIENT)
Dept: ADMINISTRATIVE | Facility: CLINIC | Age: 77
End: 2022-10-18
Payer: MEDICARE

## 2022-10-18 NOTE — TELEPHONE ENCOUNTER
Schedule for colonoscopy on tomorrow. Pt has a nasal drip due to weather change. Pt took a covid test and it is negative. Pt does not have a fever. Pt wants to know if she can still have her procedure on tomorrow. Pt stated she does not want to go through rep if procedure will be cancelled due to nasal drip. Pt also had questions about diabetic medications. Please call pt with additional care advice within next hour per care advice.   Reason for Disposition   Nursing judgment    Protocols used: Information Only Call - No Triage-A-OH

## 2022-10-19 ENCOUNTER — ANESTHESIA (OUTPATIENT)
Dept: ENDOSCOPY | Facility: HOSPITAL | Age: 77
End: 2022-10-19
Payer: MEDICARE

## 2022-10-19 ENCOUNTER — HOSPITAL ENCOUNTER (OUTPATIENT)
Facility: HOSPITAL | Age: 77
Discharge: HOME | End: 2022-10-19
Attending: INTERNAL MEDICINE | Admitting: INTERNAL MEDICINE
Payer: MEDICARE

## 2022-10-19 ENCOUNTER — ANESTHESIA EVENT (OUTPATIENT)
Dept: ENDOSCOPY | Facility: HOSPITAL | Age: 77
End: 2022-10-19
Payer: MEDICARE

## 2022-10-19 VITALS
TEMPERATURE: 98 F | WEIGHT: 136 LBS | HEART RATE: 76 BPM | RESPIRATION RATE: 15 BRPM | SYSTOLIC BLOOD PRESSURE: 121 MMHG | BODY MASS INDEX: 22.66 KG/M2 | DIASTOLIC BLOOD PRESSURE: 57 MMHG | HEIGHT: 65 IN | OXYGEN SATURATION: 93 %

## 2022-10-19 DIAGNOSIS — Z86.010 PERSONAL HISTORY OF COLONIC POLYPS: Primary | ICD-10-CM

## 2022-10-19 PROBLEM — Z86.0100 PERSONAL HISTORY OF COLONIC POLYPS: Status: ACTIVE | Noted: 2022-10-19

## 2022-10-19 LAB — GLUCOSE SERPL-MCNC: 114 MG/DL (ref 70–110)

## 2022-10-19 PROCEDURE — 63600175 PHARM REV CODE 636 W HCPCS: Performed by: NURSE ANESTHETIST, CERTIFIED REGISTERED

## 2022-10-19 PROCEDURE — G0105 COLORECTAL SCRN; HI RISK IND: HCPCS | Mod: ,,, | Performed by: INTERNAL MEDICINE

## 2022-10-19 PROCEDURE — 37000009 HC ANESTHESIA EA ADD 15 MINS: Performed by: INTERNAL MEDICINE

## 2022-10-19 PROCEDURE — G0105 COLORECTAL SCRN; HI RISK IND: HCPCS | Performed by: INTERNAL MEDICINE

## 2022-10-19 PROCEDURE — 82962 GLUCOSE BLOOD TEST: CPT | Performed by: INTERNAL MEDICINE

## 2022-10-19 PROCEDURE — G0105 COLORECTAL SCRN; HI RISK IND: ICD-10-PCS | Mod: ,,, | Performed by: INTERNAL MEDICINE

## 2022-10-19 PROCEDURE — 37000008 HC ANESTHESIA 1ST 15 MINUTES: Performed by: INTERNAL MEDICINE

## 2022-10-19 PROCEDURE — 25000003 PHARM REV CODE 250: Performed by: INTERNAL MEDICINE

## 2022-10-19 RX ORDER — DEXTROMETHORPHAN/PSEUDOEPHED 2.5-7.5/.8
DROPS ORAL
Status: COMPLETED | OUTPATIENT
Start: 2022-10-19 | End: 2022-10-19

## 2022-10-19 RX ORDER — PROPOFOL 10 MG/ML
VIAL (ML) INTRAVENOUS
Status: DISCONTINUED | OUTPATIENT
Start: 2022-10-19 | End: 2022-10-19

## 2022-10-19 RX ADMIN — PROPOFOL 50 MG: 10 INJECTION, EMULSION INTRAVENOUS at 10:10

## 2022-10-19 RX ADMIN — SODIUM CHLORIDE, POTASSIUM CHLORIDE, SODIUM LACTATE AND CALCIUM CHLORIDE: 600; 310; 30; 20 INJECTION, SOLUTION INTRAVENOUS at 10:10

## 2022-10-19 RX ADMIN — PROPOFOL 80 MG: 10 INJECTION, EMULSION INTRAVENOUS at 10:10

## 2022-10-19 NOTE — ANESTHESIA PREPROCEDURE EVALUATION
10/19/2022  Connie Fields is a 77 y.o., female.      Pre-op Assessment    I have reviewed the Patient Summary Reports.     I have reviewed the Nursing Notes.       Review of Systems  Hepatic/GI:   GERD    Musculoskeletal:   Arthritis     Endocrine:   Diabetes, type 2    Psych:   Psychiatric History depression          Physical Exam  General: Well nourished    Airway:  Mallampati: II   Mouth Opening: Normal  Neck ROM: Normal ROM        Anesthesia Plan  Type of Anesthesia, risks & benefits discussed:    Anesthesia Type: MAC  Intra-op Monitoring Plan: Standard ASA Monitors  Post Op Pain Control Plan: multimodal analgesia  Induction:  IV  Informed Consent: Informed consent signed with the Patient and all parties understand the risks and agree with anesthesia plan.  All questions answered.   ASA Score: 2  Day of Surgery Review of History & Physical: I have interviewed and examined the patient. I have reviewed the patient's H&P dated:     Ready For Surgery From Anesthesia Perspective.     .

## 2022-10-19 NOTE — [employer]ATION PATIENT INSTRUCTIONS
Discharge Summary/Instructions after an Endoscopic Procedure  Patient Name: Connie Fields  Patient MRN: [id number]  Patient YOB: 1945 Wednesday, October 19, 2022 Lindsey Gregory MD  Dear patient,  As a result of recent federal legislation (The Federal Cures Act), you may   receive lab or pathology results from your procedure in your [facility]   account before your physician is able to contact you. Your physician or   their representative will relay the results to you with their   recommendations at their soonest availability.  Thank you,  RESTRICTIONS:  During your procedure today, you received medications for sedation.  These   medications may affect your judgment, balance and coordination.  Therefore,   for 24 hours, you have the following restrictions:   - DO NOT drive a car, operate machinery, make legal/financial decisions,   sign important papers or drink alcohol.    ACTIVITY:  Today: no heavy lifting, straining or running due to procedural   sedation/anesthesia.  The following day: return to full activity including work.  DIET:  Eat and drink normally unless instructed otherwise.     TREATMENT FOR COMMON SIDE EFFECTS:  - Mild abdominal pain, nausea, belching, bloating or excessive gas:  rest,   eat lightly and use a heating pad.  - Sore Throat: treat with throat lozenges and/or gargle with warm salt   water.  - Because air was used during the procedure, expelling large amounts of air   from your rectum or belching is normal.  - If a bowel prep was taken, you may not have a bowel movement for 1-3 days.    This is normal.  SYMPTOMS TO WATCH FOR AND REPORT TO YOUR PHYSICIAN:  1. Abdominal pain or bloating, other than gas cramps.  2. Chest pain.  3. Back pain.  4. Signs of infection such as: chills or fever occurring within 24 hours   after the procedure.  5. Rectal bleeding, which would show as bright red, maroon, or black stools.   (A tablespoon of blood from the rectum is not serious,  especially if   hemorrhoids are present.)  6. Vomiting.  7. Weakness or dizziness.  GO DIRECTLY TO THE NEAREST EMERGENCY ROOM IF YOU HAVE ANY OF THE FOLLOWING:      Difficulty breathing              Chills and/or fever over 101 F   Persistent vomiting and/or vomiting blood   Severe abdominal pain   Severe chest pain   Black, tarry stools   Bleeding- more than one tablespoon   Any other symptom or condition that you feel may need urgent attention  Your doctor recommends these additional instructions:  If any biopsies were taken, your doctors clinic will contact you in 1 to 2   weeks with any results.  - Discharge patient to home (via wheelchair).   - Resume previous diet.   - Continue present medications.   - Repeat colonoscopy in 5 years for surveillance.   - Patient has a contact number available for emergencies.  The signs and   symptoms of potential delayed complications were discussed with the   patient.  Return to normal activities tomorrow.  Written discharge   instructions were provided to the patient.  For questions, problems or results please call your physician Lindsey Gregory MD at Work:  [phone]  If you have any questions about the above instructions, call the GI   department at [phone] or call the endoscopy unit at [phone]   from 7am until 3 pm.  [facility] [place], EMERGENCY ROOM PHONE NUMBER:   [phone]  IF A COMPLICATION OR EMERGENCY SITUATION ARISES AND YOU ARE UNABLE TO REACH   YOUR PHYSICIAN - GO DIRECTLY TO THE EMERGENCY ROOM.  I have read or have had read to me these discharge instructions for my   procedure and have received a written copy.  I understand these   instructions and will follow-up with my physician if I have any questions.     __________________________________       _____________________________________  Nurse Signature                                          Patient/Designated   Responsible Party Signature  Lindsey Gregory  MD Lindsey Gregory MD  10/19/2022 10:23:58 AM  [employer]

## 2022-10-19 NOTE — ANESTHESIA POSTPROCEDURE EVALUATION
Anesthesia Post Evaluation    Patient: Connie Fields    Procedure(s) Performed: Procedure(s) (LRB):  COLONOSCOPY (N/A)    Final Anesthesia Type: MAC      Patient location during evaluation: GI PACU  Patient participation: Yes- Able to Participate  Level of consciousness: awake and alert and awake  Post-procedure vital signs: reviewed and stable  Pain management: adequate  Airway patency: patent  CAROLYNN mitigation strategies: Multimodal analgesia  PONV status at discharge: No PONV  Anesthetic complications: no      Cardiovascular status: blood pressure returned to baseline and hemodynamically stable  Respiratory status: unassisted and spontaneous ventilation  Hydration status: euvolemic  Follow-up not needed.          Vitals Value Taken Time   BP  10/19/22 1025   Temp  10/19/22 1025   Pulse  10/19/22 1025   Resp  10/19/22 1025   SpO2  10/19/22 1025         No case tracking events are documented in the log.      Pain/Carmen Score: No data recorded

## 2022-10-19 NOTE — PLAN OF CARE
Dr abernathy came to bedside to discuss findings. Vital signs stable, no patient c/o nausea/vomitting, no abdominal pain, no gi bleeding. Patient to be discharged from unit.

## 2022-10-19 NOTE — H&P
Short Stay Endoscopy History and Physical    PCP - Loli Erwin MD    Procedure - Colonoscopy  ASA - 2  Mallampati - per anesthesia  History of Anesthesia problems - no  Family history Anesthesia problems -  no     HPI:  This is a 77 y.o. female here for evaluation of :   Active Hospital Problems    Diagnosis  POA    *Personal history of colonic polyps [Z86.010]  Not Applicable      Resolved Hospital Problems   No resolved problems to display.         Health Maintenance         Date Due Completion Date    TETANUS VACCINE Never done ---    COVID-19 Vaccine (5 - Booster for Pfizer series) 09/09/2022 7/15/2022    Hemoglobin A1c 02/19/2023 8/19/2022    Override on 1/22/2014: Done    Eye Exam 03/09/2023 3/9/2022    Override on 3/1/2021: Done    Override on 1/22/2015: Done    Diabetes Urine Screening 08/19/2023 8/19/2022    Lipid Panel 08/19/2023 8/19/2022    Override on 1/31/2014: Done    DEXA Scan 12/14/2023 12/14/2020    Override on 7/23/2015: Not Clinically Appropriate (gyn)    Colonoscopy 12/06/2026 12/6/2016            ROS:  CONSTITUTIONAL: Denies weight change,  fatigue, fevers, chills, night sweats.  CARDIOVASCULAR: Denies chest pain, shortness of breath, orthopnea and edema.  RESPIRATORY: Denies cough, hemoptysis, dyspnea, and wheezing.  GI: See HPI.    Medical History:   Past Medical History:   Diagnosis Date    Anxiety     Anxiety     Cataract (lens) fragments in eye following cataract surgery, bilateral     DM (diabetes mellitus) 2013     10/21/2018    DM (diabetes mellitus) 2013     am 12/09/2019    DM2 (diabetes mellitus, type 2) 01/13    GERD (gastroesophageal reflux disease)     Hiatal hernia     Mild nonproliferative diabetic retinopathy of both eyes 1/22/15    OS>OD    Mild nonproliferative diabetic retinopathy of both eyes 1/22/2015    OS>OD     Osteoarthritis        Surgical History:   Past Surgical History:   Procedure Laterality Date    AUGMENTATION OF BREAST      saline    BREAST  SURGERY Bilateral 1992    saline implants    BUNIONECTOMY      CATARACT EXTRACTION W/  INTRAOCULAR LENS IMPLANT Bilateral     12/12/18 OD, 1/9/19 OS, Dr Higuera    CHOLECYSTECTOMY  05/12    COLONOSCOPY  08    1 POLYP    COLONOSCOPY N/A 12/6/2016    Procedure: COLONOSCOPY with biopsies;  Surgeon: Sarah Schmidt MD;  Location: [facility];  Service: Endoscopy;  Laterality: N/A;    PCIOL  Right 12/12/2018    DR. HIGUERA    TONSILLECTOMY, ADENOIDECTOMY         Family History:   Family History   Problem Relation Age of Onset    Heart failure Father     Suicide Father     Diabetes Mother     Heart failure Mother     Hypertension Mother     Stroke Mother     Heart failure Brother     Cancer Brother         full body    Hypertension Brother     Coronary artery disease Brother     Diabetes Maternal Grandmother     Hypertension Son     Suicide Son        Social History:   Social History     Tobacco Use    Smoking status: Never    Smokeless tobacco: Never   Substance Use Topics    Alcohol use: Yes     Comment: occasionally    Drug use: No       Allergies:   Review of patient's allergies indicates:   Allergen Reactions    Hydrocodone Nausea And Vomiting     Once only - on empty stomach       Medications:   No current facility-administered medications on file prior to encounter.     Current Outpatient Medications on File Prior to Encounter   Medication Sig Dispense Refill    aspirin (ECOTRIN) 81 MG EC tablet Take 81 mg by mouth once daily.      buPROPion (WELLBUTRIN XL) 150 MG TB24 tablet Take 1 tablet (150 mg total) by mouth once daily. 90 tablet 3    cholecalciferol, vitamin D3, (VITAMIN D3) 25 mcg (1,000 unit) capsule Take 1,000 Units by mouth once daily.      clonazePAM (KLONOPIN) 0.5 MG tablet 1/2 to 1 up to BID prn severe anxiety 30 tablet 0    cyanocobalamin (VITAMIN B-12) 1000 MCG tablet Take 100 mcg by mouth once daily. Taking a B-complex      fish oil-omega-3 fatty acids 300-1,000 mg capsule Take 2 g by mouth once  daily.      fluticasone propionate (FLONASE) 50 mcg/actuation nasal spray 1 spray in each nostril 15.8 mL 2    levocetirizine (XYZAL) 5 MG tablet Take 1 tablet (5 mg total) by mouth every evening. 30 tablet 11    lysine 500 mg Tab Take 500 mg by mouth once daily.      montelukast (SINGULAIR) 10 mg tablet 1 tablet in the evening      multivitamin capsule Take 1 capsule by mouth once daily.      omeprazole (PRILOSEC) 20 MG capsule Take 20 mg by mouth once daily.      oxybutynin (DITROPAN) 5 MG Tab Take 1 tablet (5 mg total) by mouth 2 (two) times daily. 180 tablet 3    paroxetine (PAXIL) 30 MG tablet TAKE 1 TABLET (30 MG TOTAL) BY MOUTH EVERY MORNING. 90 tablet 1    PHENAZOPYRIDINE HCL (AZO ORAL) Take 1 tablet by mouth daily as needed.       pravastatin (PRAVACHOL) 40 MG tablet Take 1 tablet (40 mg total) by mouth once daily. 90 tablet 3    blood sugar diagnostic (BLOOD GLUCOSE TEST) Strp 1 each by Misc.(Non-Drug; Combo Route) route once daily. One touch verio IQ (Patient not taking: Reported on 9/7/2022) 100 each 4    blood-glucose meter kit Use as instructed (Patient not taking: Reported on 9/7/2022) 1 each 0    lancets (ONE TOUCH DELICA LANCETS) 33 gauge Misc 1 lancet by Misc.(Non-Drug; Combo Route) route 2 (two) times daily. (Patient not taking: Reported on 9/7/2022) 200 each 3    traZODone (DESYREL) 50 MG tablet Take 1 tablet (50 mg total) by mouth every evening. 90 tablet 3       Physical Exam:  Vital Signs:   Vitals:    10/19/22 0850   BP: (!) 153/88   Pulse:    Resp:    Temp:      General Appearance: Well appearing in no acute distress  ENT: OP clear  Chest: CTA B  CV: RRR, no m/r/g  Abd: s/nt/nd/nabs  Ext: no edema    Labs:Reviewed    IMP:  Active Hospital Problems    Diagnosis  POA    *Personal history of colonic polyps [Z86.010]  Not Applicable      Resolved Hospital Problems   No resolved problems to display.         Plan:   I have explained the risks and benefits of colonoscopy to the patient including  but not limited to bleeding, perforation, infection, and death. The patient wishes to proceed.

## 2022-10-19 NOTE — TRANSFER OF CARE
"Anesthesia Transfer of Care Note    Patient: Connie Fields    Procedure(s) Performed: Procedure(s) (LRB):  COLONOSCOPY (N/A)    Patient location: GI    Anesthesia Type: MAC    Transport from OR: Transported from OR on room air with adequate spontaneous ventilation    Post pain: adequate analgesia    Post assessment: no apparent anesthetic complications and tolerated procedure well    Post vital signs: stable    Level of consciousness: awake and alert    Nausea/Vomiting: no nausea/vomiting    Complications: none    Transfer of care protocol was followed      Last vitals:   Visit Vitals  BP (!) 153/88   Pulse 88   Temp 36.7 °C (98.1 °F)   Resp 18   Ht 5' 5" (1.651 m)   Wt 61.7 kg (136 lb)   SpO2 98%   Breastfeeding No   BMI 22.63 kg/m²     "

## 2022-11-29 LAB — POCT GLUCOSE: 114 MG/DL (ref 70–110)

## 2022-12-02 ENCOUNTER — TELEPHONE (OUTPATIENT)
Dept: INTERNAL MEDICINE | Facility: CLINIC | Age: 77
End: 2022-12-02
Payer: MEDICARE

## 2022-12-02 DIAGNOSIS — Z12.31 SCREENING MAMMOGRAM, ENCOUNTER FOR: Primary | ICD-10-CM

## 2022-12-02 NOTE — TELEPHONE ENCOUNTER
----- Message from Karissa Rowland sent at 12/2/2022 11:17 AM CST -----  .Type:  Mammogram    Caller is requesting to schedule their annual mammogram appointment.  Order is not listed in EPIC.  Please enter order and contact patient to schedule.  Name of Caller: Connie  Where would they like the mammogram performed? [facility]   Would the patient rather a call back or a response via [facility]? Callback  Best Call Back Number: [phone] (home) [phone] (work)     Additional Information:

## 2022-12-07 ENCOUNTER — IMMUNIZATION (OUTPATIENT)
Dept: PRIMARY CARE CLINIC | Facility: CLINIC | Age: 77
End: 2022-12-07
Payer: MEDICARE

## 2022-12-07 DIAGNOSIS — Z23 NEED FOR VACCINATION: Primary | ICD-10-CM

## 2022-12-07 PROCEDURE — 91312 COVID-19, MRNA, LNP-S, BIVALENT BOOSTER, PF, 30 MCG/0.3 ML DOSE: CPT | Mod: PBBFAC | Performed by: FAMILY MEDICINE

## 2022-12-07 PROCEDURE — 0124A COVID-19, MRNA, LNP-S, BIVALENT BOOSTER, PF, 30 MCG/0.3 ML DOSE: CPT | Mod: CV19,PBBFAC | Performed by: FAMILY MEDICINE

## 2022-12-19 ENCOUNTER — TELEPHONE (OUTPATIENT)
Dept: ADMINISTRATIVE | Facility: HOSPITAL | Age: 77
End: 2022-12-19
Payer: MEDICARE

## 2022-12-27 ENCOUNTER — TELEPHONE (OUTPATIENT)
Dept: ADMINISTRATIVE | Facility: HOSPITAL | Age: 77
End: 2022-12-27
Payer: MEDICARE

## 2022-12-30 DIAGNOSIS — G47.00 INSOMNIA, UNSPECIFIED TYPE: ICD-10-CM

## 2022-12-30 RX ORDER — TRAZODONE HYDROCHLORIDE 50 MG/1
50 TABLET ORAL NIGHTLY
Qty: 90 TABLET | Refills: 2 | Status: SHIPPED | OUTPATIENT
Start: 2022-12-30 | End: 2024-01-03 | Stop reason: SDUPTHER

## 2022-12-30 NOTE — TELEPHONE ENCOUNTER
Care Due:                  Date            Visit Type   Department     Provider  --------------------------------------------------------------------------------                                EP -                              PRIMARY      [facility] INTERNAL  Last Visit: 09-      [facility]ARE ([facility])   TENZIN Erwin                              EP -                              PRIMARY      [facility] INTERNAL  Next Visit: 03-      [facility] ([facility])   TENZIN Erwin                                                            Last  Test          Frequency    Reason                     Performed    Due Date  --------------------------------------------------------------------------------    HBA1C.......  6 months...  metFORMIN................  08-   02-    [facility] Embedded Care Gaps. Reference number: [id number]. 12/30/2022   11:30:54 AM CST

## 2022-12-30 NOTE — TELEPHONE ENCOUNTER
Refill Decision Note   Connie Fields  is requesting a refill authorization.  Brief Assessment and Rationale for Refill:  Approve     Medication Therapy Plan:  FLOS 3/1/23    Medication Reconciliation Completed: No   Comments:     No Care Gaps recommended.     Note composed:2:48 PM 12/30/2022

## 2023-01-03 ENCOUNTER — TELEPHONE (OUTPATIENT)
Dept: INTERNAL MEDICINE | Facility: CLINIC | Age: 78
End: 2023-01-03
Payer: MEDICARE

## 2023-01-03 NOTE — TELEPHONE ENCOUNTER
Patient wanting to know if it is time for her DEXA. Advised her that it was recommended on her last to have it on or after 12/14/23.   Verbalized understanding.

## 2023-01-03 NOTE — TELEPHONE ENCOUNTER
----- Message from Callie Tobarkatiejulissabill sent at 1/3/2023 12:26 PM CST -----  Contact: pt  Pt is calling in regard to see if dr. Erwin could scheduled her for a bone density on the same day of her mammo 1/17 at 3:20pm.  Please call her back at [phone] thanks/mpd

## 2023-01-11 ENCOUNTER — TELEPHONE (OUTPATIENT)
Dept: ADMINISTRATIVE | Facility: HOSPITAL | Age: 78
End: 2023-01-11
Payer: MEDICARE

## 2023-01-11 ENCOUNTER — TELEPHONE (OUTPATIENT)
Dept: INTERNAL MEDICINE | Facility: CLINIC | Age: 78
End: 2023-01-11
Payer: MEDICARE

## 2023-01-11 DIAGNOSIS — R11.0 NAUSEA: Primary | ICD-10-CM

## 2023-01-11 RX ORDER — ONDANSETRON 4 MG/1
4 TABLET, FILM COATED ORAL EVERY 6 HOURS PRN
Qty: 20 TABLET | Refills: 0 | Status: SHIPPED | OUTPATIENT
Start: 2023-01-11

## 2023-01-11 NOTE — TELEPHONE ENCOUNTER
Called and spoke with patient. She is nauseous and having stomach issues for about two weeks now. I scheduled her with nohemi tomorrow morning. She asked if she can get some medication sent in for nauseas until she can be seen tomorrow morning.

## 2023-01-11 NOTE — TELEPHONE ENCOUNTER
----- Message from Latesha Juarez sent at 1/11/2023 10:04 AM CST -----  Contact: [phone]  Pt would like to consult with a nurse in regards to concerns that she has. Please call her back at [phone]. Thanks KB

## 2023-01-12 ENCOUNTER — OFFICE VISIT (OUTPATIENT)
Dept: INTERNAL MEDICINE | Facility: CLINIC | Age: 78
End: 2023-01-12
Payer: MEDICARE

## 2023-01-12 VITALS
TEMPERATURE: 98 F | BODY MASS INDEX: 23.4 KG/M2 | OXYGEN SATURATION: 96 % | HEIGHT: 65 IN | SYSTOLIC BLOOD PRESSURE: 124 MMHG | WEIGHT: 140.44 LBS | HEART RATE: 73 BPM | DIASTOLIC BLOOD PRESSURE: 64 MMHG

## 2023-01-12 DIAGNOSIS — E11.8 CONTROLLED TYPE 2 DIABETES MELLITUS WITH COMPLICATION, WITHOUT LONG-TERM CURRENT USE OF INSULIN: ICD-10-CM

## 2023-01-12 DIAGNOSIS — E78.5 HYPERLIPIDEMIA ASSOCIATED WITH TYPE 2 DIABETES MELLITUS: ICD-10-CM

## 2023-01-12 DIAGNOSIS — E11.69 HYPERLIPIDEMIA ASSOCIATED WITH TYPE 2 DIABETES MELLITUS: ICD-10-CM

## 2023-01-12 DIAGNOSIS — F33.1 MAJOR DEPRESSIVE DISORDER, RECURRENT EPISODE, MODERATE: ICD-10-CM

## 2023-01-12 DIAGNOSIS — R11.0 NAUSEA: ICD-10-CM

## 2023-01-12 DIAGNOSIS — K21.9 GASTROESOPHAGEAL REFLUX DISEASE, UNSPECIFIED WHETHER ESOPHAGITIS PRESENT: Primary | ICD-10-CM

## 2023-01-12 PROCEDURE — 3074F PR MOST RECENT SYSTOLIC BLOOD PRESSURE < 130 MM HG: ICD-10-PCS | Mod: HCNC,CPTII,S$GLB,

## 2023-01-12 PROCEDURE — 1126F AMNT PAIN NOTED NONE PRSNT: CPT | Mod: HCNC,CPTII,S$GLB,

## 2023-01-12 PROCEDURE — 3074F SYST BP LT 130 MM HG: CPT | Mod: HCNC,CPTII,S$GLB,

## 2023-01-12 PROCEDURE — 1101F PR PT FALLS ASSESS DOC 0-1 FALLS W/OUT INJ PAST YR: ICD-10-PCS | Mod: HCNC,CPTII,S$GLB,

## 2023-01-12 PROCEDURE — 3288F PR FALLS RISK ASSESSMENT DOCUMENTED: ICD-10-PCS | Mod: HCNC,CPTII,S$GLB,

## 2023-01-12 PROCEDURE — 1160F PR REVIEW ALL MEDS BY PRESCRIBER/CLIN PHARMACIST DOCUMENTED: ICD-10-PCS | Mod: HCNC,CPTII,S$GLB,

## 2023-01-12 PROCEDURE — 99999 PR PBB SHADOW E&M-EST. PATIENT-LVL V: CPT | Mod: PBBFAC,HCNC,,

## 2023-01-12 PROCEDURE — 1159F MED LIST DOCD IN RCRD: CPT | Mod: HCNC,CPTII,S$GLB,

## 2023-01-12 PROCEDURE — 1160F RVW MEDS BY RX/DR IN RCRD: CPT | Mod: HCNC,CPTII,S$GLB,

## 2023-01-12 PROCEDURE — 99214 OFFICE O/P EST MOD 30 MIN: CPT | Mod: HCNC,S$GLB,,

## 2023-01-12 PROCEDURE — 99214 PR OFFICE/OUTPT VISIT, EST, LEVL IV, 30-39 MIN: ICD-10-PCS | Mod: HCNC,S$GLB,,

## 2023-01-12 PROCEDURE — 1101F PT FALLS ASSESS-DOCD LE1/YR: CPT | Mod: HCNC,CPTII,S$GLB,

## 2023-01-12 PROCEDURE — 1126F PR PAIN SEVERITY QUANTIFIED, NO PAIN PRESENT: ICD-10-PCS | Mod: HCNC,CPTII,S$GLB,

## 2023-01-12 PROCEDURE — 3078F PR MOST RECENT DIASTOLIC BLOOD PRESSURE < 80 MM HG: ICD-10-PCS | Mod: HCNC,CPTII,S$GLB,

## 2023-01-12 PROCEDURE — 3288F FALL RISK ASSESSMENT DOCD: CPT | Mod: HCNC,CPTII,S$GLB,

## 2023-01-12 PROCEDURE — 1159F PR MEDICATION LIST DOCUMENTED IN MEDICAL RECORD: ICD-10-PCS | Mod: HCNC,CPTII,S$GLB,

## 2023-01-12 PROCEDURE — 99999 PR PBB SHADOW E&M-EST. PATIENT-LVL V: ICD-10-PCS | Mod: PBBFAC,HCNC,,

## 2023-01-12 PROCEDURE — 3078F DIAST BP <80 MM HG: CPT | Mod: HCNC,CPTII,S$GLB,

## 2023-01-12 RX ORDER — PANTOPRAZOLE SODIUM 40 MG/1
40 TABLET, DELAYED RELEASE ORAL DAILY
Qty: 90 TABLET | Refills: 1 | Status: SHIPPED | OUTPATIENT
Start: 2023-01-12 | End: 2023-07-05 | Stop reason: SDUPTHER

## 2023-01-12 NOTE — PROGRESS NOTES
Connie Fields  01/12/2023  [id number]    Loli Erwin MD  Patient Care Team:  Loli Erwin MD as PCP - General (Family Medicine)  Juventino Higuera MD as Consulting Physician (Ophthalmology)  Edilia Vasquez LCSW as  (Psychiatry)  Tito Anderson, PENNY as Consulting Physician (Optometry)          Visit Type:an urgent visit for an existing problem     Chief Complaint:  Chief Complaint   Patient presents with    Gastroesophageal Reflux    Nausea    Emesis       History of Present Illness:    She has been having nausea for the last 2 weeks  She does vomit at times  Zofran was called in yesterday and it helped with symptoms    History of GERD  Tried OTC Pepcid and Prilosec without relief  Notices that fried food does make GERD worse  Denies CP  Feels a burning sensation     DM  Lab Results   Component Value Date    HGBA1C 5.4 08/19/2022        History:  Past Medical History:   Diagnosis Date    Anxiety     Anxiety     Cataract (lens) fragments in eye following cataract surgery, bilateral     DM (diabetes mellitus) 2013     10/21/2018    DM (diabetes mellitus) 2013     am 12/09/2019    DM2 (diabetes mellitus, type 2) 01/13    GERD (gastroesophageal reflux disease)     Hiatal hernia     Mild nonproliferative diabetic retinopathy of both eyes 1/22/15    OS>OD    Mild nonproliferative diabetic retinopathy of both eyes 1/22/2015    OS>OD     Osteoarthritis      Past Surgical History:   Procedure Laterality Date    AUGMENTATION OF BREAST      saline    BREAST SURGERY Bilateral 1992    saline implants    BUNIONECTOMY      CATARACT EXTRACTION W/  INTRAOCULAR LENS IMPLANT Bilateral     12/12/18 OD, 1/9/19 OS, Dr Higuera    CHOLECYSTECTOMY  05/12    COLONOSCOPY  08    1 POLYP    COLONOSCOPY N/A 12/6/2016    Procedure: COLONOSCOPY with biopsies;  Surgeon: Sarah Schmidt MD;  Location: [facility];  Service: Endoscopy;  Laterality: N/A;    COLONOSCOPY N/A 10/19/2022    Procedure:  COLONOSCOPY;  Surgeon: Lindsey Gregory MD;  Location: [facility];  Service: Endoscopy;  Laterality: N/A;    PCIOL  Right 12/12/2018    DR. JUAREZ    TONSILLECTOMY, ADENOIDECTOMY       Family History   Problem Relation Age of Onset    Heart failure Father     Suicide Father     Diabetes Mother     Heart failure Mother     Hypertension Mother     Stroke Mother     Heart failure Brother     Cancer Brother         full body    Hypertension Brother     Coronary artery disease Brother     Diabetes Maternal Grandmother     Hypertension Son     Suicide Son      Social History     Socioeconomic History    Marital status:    Tobacco Use    Smoking status: Never    Smokeless tobacco: Never   Substance and Sexual Activity    Alcohol use: Yes     Comment: occasionally    Drug use: No    Sexual activity: Yes     Partners: Male     Patient Active Problem List   Diagnosis    Generalized anxiety disorder    History of diabetic retinopathy Mild nonproliferative diabetic retinopathy of both eyes (None on eye exam 11/26/2018)    Mixed stress and urge urinary incontinence    Controlled type 2 diabetes mellitus with complication, without long-term current use of insulin    Nuclear sclerosis of both eyes    Diabetes mellitus type 2 without retinopathy    Hyperlipidemia associated with type 2 diabetes mellitus    Primary snoring    Nocturnal hypoxemia    Major depressive disorder, recurrent episode, moderate    Personal history of colonic polyps     Review of patient's allergies indicates:   Allergen Reactions    Hydrocodone Nausea And Vomiting     Once only - on empty stomach       The following were reviewed at this visit: active problem list, medication list, allergies, family history, social history, and health maintenance.    Medications:  Current Outpatient Medications on File Prior to Visit   Medication Sig Dispense Refill    aspirin (ECOTRIN) 81 MG EC tablet Take 81 mg by mouth once daily.      buPROPion (WELLBUTRIN XL)  150 MG TB24 tablet Take 1 tablet (150 mg total) by mouth once daily. 90 tablet 3    cholecalciferol, vitamin D3, (VITAMIN D3) 25 mcg (1,000 unit) capsule Take 1,000 Units by mouth once daily.      clonazePAM (KLONOPIN) 0.5 MG tablet 1/2 to 1 up to BID prn severe anxiety 30 tablet 0    cyanocobalamin (VITAMIN B-12) 1000 MCG tablet Take 100 mcg by mouth once daily. Taking a B-complex      famotidine (PEPCID) 40 MG tablet TAKE 1 TABLET EVERY DAY 90 tablet 3    fish oil-omega-3 fatty acids 300-1,000 mg capsule Take 2 g by mouth once daily.      fluticasone propionate (FLONASE) 50 mcg/actuation nasal spray 1 spray in each nostril 15.8 mL 2    lancets (ONE TOUCH DELICA LANCETS) 33 gauge Misc 1 lancet by Misc.(Non-Drug; Combo Route) route 2 (two) times daily. 200 each 3    levocetirizine (XYZAL) 5 MG tablet Take 1 tablet (5 mg total) by mouth every evening. 30 tablet 11    lysine 500 mg Tab Take 500 mg by mouth once daily.      metFORMIN (GLUCOPHAGE-XR) 500 MG ER 24hr tablet TAKE 2 TABLETS TWICE DAILY 360 tablet 1    montelukast (SINGULAIR) 10 mg tablet 1 tablet in the evening      multivitamin capsule Take 1 capsule by mouth once daily.      omeprazole (PRILOSEC) 20 MG capsule Take 20 mg by mouth once daily.      ondansetron (ZOFRAN) 4 MG tablet Take 1 tablet (4 mg total) by mouth every 6 (six) hours as needed for Nausea. 20 tablet 0    oxybutynin (DITROPAN) 5 MG Tab Take 1 tablet (5 mg total) by mouth 2 (two) times daily. 180 tablet 3    paroxetine (PAXIL) 30 MG tablet TAKE 1 TABLET (30 MG TOTAL) BY MOUTH EVERY MORNING. 90 tablet 1    PHENAZOPYRIDINE HCL (AZO ORAL) Take 1 tablet by mouth daily as needed.       pravastatin (PRAVACHOL) 40 MG tablet Take 1 tablet (40 mg total) by mouth once daily. 90 tablet 3    traZODone (DESYREL) 50 MG tablet Take 1 tablet (50 mg total) by mouth every evening. 90 tablet 2    blood sugar diagnostic (BLOOD GLUCOSE TEST) Strp 1 each by Misc.(Non-Drug; Combo Route) route once daily. One touch  verio IQ (Patient not taking: Reported on 1/12/2023) 100 each 4    blood-glucose meter kit Use as instructed (Patient not taking: Reported on 1/12/2023) 1 each 0     No current facility-administered medications on file prior to visit.       Medications have been reviewed and reconciled with patient at this visit.  Barriers to medications reviewed with patient.    Adverse reactions to current medications reviewed with patient..    Over the counter medications reviewed and reconciled with patient.    Exam:  Wt Readings from Last 3 Encounters:   01/12/23 63.7 kg (140 lb 6.9 oz)   10/19/22 61.7 kg (136 lb)   09/07/22 62.8 kg (138 lb 5.4 oz)     Temp Readings from Last 3 Encounters:   01/12/23 98.2 °F (36.8 °C) (Tympanic)   10/19/22 98.2 °F (36.8 °C) (Temporal)   09/07/22 98.4 °F (36.9 °C) (Temporal)     BP Readings from Last 3 Encounters:   01/12/23 124/64   10/19/22 (!) 121/57   09/07/22 110/64     Pulse Readings from Last 3 Encounters:   01/12/23 73   10/19/22 76   09/07/22 93     Body mass index is 23.37 kg/m².      Review of Systems   Respiratory:  Negative for shortness of breath.    Cardiovascular:  Negative for chest pain and palpitations.   Gastrointestinal:  Positive for heartburn, nausea and vomiting. Negative for constipation.   Physical Exam  Vitals and nursing note reviewed.   Constitutional:       General: She is not in acute distress.     Appearance: She is well-developed. She is not diaphoretic.   HENT:      Head: Normocephalic and atraumatic.      Right Ear: External ear normal.      Left Ear: External ear normal.   Eyes:      General:         Right eye: No discharge.         Left eye: No discharge.      Conjunctiva/sclera: Conjunctivae normal.      Pupils: Pupils are equal, round, and reactive to light.   Cardiovascular:      Rate and Rhythm: Normal rate and regular rhythm.      Heart sounds: Normal heart sounds. No murmur heard.  Pulmonary:      Effort: Pulmonary effort is normal. No respiratory  distress.      Breath sounds: Normal breath sounds. No wheezing.   Abdominal:      General: Bowel sounds are normal. There is no distension.      Tenderness: There is no abdominal tenderness.   Neurological:      Mental Status: She is alert and oriented to person, place, and time.   Psychiatric:         Behavior: Behavior normal.         Thought Content: Thought content normal.         Judgment: Judgment normal.       Laboratory Reviewed ({Yes)  Lab Results   Component Value Date    WBC 5.60 08/19/2022    HGB 13.4 08/19/2022    HCT 39.9 08/19/2022     08/19/2022    CHOL 107 (L) 08/19/2022    TRIG 48 08/19/2022    HDL 54 08/19/2022    ALT 14 08/19/2022    AST 17 08/19/2022     08/19/2022    K 4.2 08/19/2022     08/19/2022    CREATININE 0.6 08/19/2022    BUN 12 08/19/2022    CO2 29 08/19/2022    HGBA1C 5.4 08/19/2022       Connie was seen today for gastroesophageal reflux, nausea and emesis.    Diagnoses and all orders for this visit:    Gastroesophageal reflux disease, unspecified whether esophagitis present  -     pantoprazole (PROTONIX) 40 MG tablet; Take 1 tablet (40 mg total) by mouth once daily.  -     Ambulatory referral/consult to Endo Procedure ; Future    Nausea  -     Ambulatory referral/consult to Endo Procedure ; Future    Hyperlipidemia associated with type 2 diabetes mellitus   Stable on medication. Continue current Plan of Care      Major depressive disorder, recurrent episode, moderate  Continue current treatment plan as previously prescribed with your      Controlled type 2 diabetes mellitus with complication, without long-term current use of insulin  Lab Results   Component Value Date    HGBA1C 5.4 08/19/2022    Stable on medication. Continue current Plan of Care      Pt would like an EGD  Will order    Change to Protonix daily    1.Take Acid Reflux Medication on an empty stomach. Take the medication 1-2 hours before eating and taking other medications  2. Wait  at least 2 hours before laying flat  3. Keep a food journal to learn which food triggers Acid Reflux. Fried Food, spicy food, and red sauces usually trigger Acid Reflux         Care Plan/Goals: Reviewed    Goals    None         Follow up: No follow-ups on file.    After visit summary was printed and given to patient upon discharge today.  Patient goals and care plan are included in After Visit Summary.

## 2023-01-17 ENCOUNTER — HOSPITAL ENCOUNTER (OUTPATIENT)
Dept: RADIOLOGY | Facility: HOSPITAL | Age: 78
Discharge: HOME | End: 2023-01-17
Attending: FAMILY MEDICINE
Payer: MEDICARE

## 2023-01-17 DIAGNOSIS — Z12.31 SCREENING MAMMOGRAM, ENCOUNTER FOR: ICD-10-CM

## 2023-01-17 PROCEDURE — 77067 SCR MAMMO BI INCL CAD: CPT | Mod: 26,HCNC,, | Performed by: RADIOLOGY

## 2023-01-17 PROCEDURE — 77067 MAMMO DIGITAL SCREENING BILAT WITH TOMO: ICD-10-PCS | Mod: 26,HCNC,, | Performed by: RADIOLOGY

## 2023-01-17 PROCEDURE — 77067 SCR MAMMO BI INCL CAD: CPT | Mod: TC,HCNC

## 2023-01-17 PROCEDURE — 77063 BREAST TOMOSYNTHESIS BI: CPT | Mod: 26,HCNC,, | Performed by: RADIOLOGY

## 2023-01-17 PROCEDURE — 77063 MAMMO DIGITAL SCREENING BILAT WITH TOMO: ICD-10-PCS | Mod: 26,HCNC,, | Performed by: RADIOLOGY

## 2023-01-25 ENCOUNTER — HOSPITAL ENCOUNTER (OUTPATIENT)
Dept: PREADMISSION TESTING | Facility: HOSPITAL | Age: 78
Discharge: HOME | End: 2023-01-25
Payer: MEDICARE

## 2023-01-25 DIAGNOSIS — R11.0 NAUSEA: ICD-10-CM

## 2023-01-25 DIAGNOSIS — K21.9 GASTROESOPHAGEAL REFLUX DISEASE, UNSPECIFIED WHETHER ESOPHAGITIS PRESENT: Primary | ICD-10-CM

## 2023-01-31 ENCOUNTER — ANESTHESIA EVENT (OUTPATIENT)
Dept: ENDOSCOPY | Facility: HOSPITAL | Age: 78
End: 2023-01-31
Payer: MEDICARE

## 2023-01-31 ENCOUNTER — ANESTHESIA (OUTPATIENT)
Dept: ENDOSCOPY | Facility: HOSPITAL | Age: 78
End: 2023-01-31
Payer: MEDICARE

## 2023-01-31 ENCOUNTER — HOSPITAL ENCOUNTER (OUTPATIENT)
Facility: HOSPITAL | Age: 78
Discharge: HOME | End: 2023-01-31
Attending: INTERNAL MEDICINE | Admitting: INTERNAL MEDICINE
Payer: MEDICARE

## 2023-01-31 DIAGNOSIS — R11.0 NAUSEA: Primary | ICD-10-CM

## 2023-01-31 DIAGNOSIS — R11.0 NAUSEA: ICD-10-CM

## 2023-01-31 DIAGNOSIS — K44.9 HIATAL HERNIA: ICD-10-CM

## 2023-01-31 LAB
GLUCOSE SERPL-MCNC: 91 MG/DL (ref 70–110)
POCT GLUCOSE: 91 MG/DL (ref 70–110)

## 2023-01-31 PROCEDURE — 63600175 PHARM REV CODE 636 W HCPCS: Mod: HCNC | Performed by: NURSE ANESTHETIST, CERTIFIED REGISTERED

## 2023-01-31 PROCEDURE — 88305 TISSUE EXAM BY PATHOLOGIST: CPT | Mod: HCNC | Performed by: PATHOLOGY

## 2023-01-31 PROCEDURE — 37000008 HC ANESTHESIA 1ST 15 MINUTES: Mod: HCNC | Performed by: INTERNAL MEDICINE

## 2023-01-31 PROCEDURE — 88305 TISSUE EXAM BY PATHOLOGIST: ICD-10-PCS | Mod: 26,HCNC,, | Performed by: PATHOLOGY

## 2023-01-31 PROCEDURE — 25000003 PHARM REV CODE 250: Mod: HCNC | Performed by: NURSE ANESTHETIST, CERTIFIED REGISTERED

## 2023-01-31 PROCEDURE — 88305 TISSUE EXAM BY PATHOLOGIST: CPT | Mod: 26,HCNC,, | Performed by: PATHOLOGY

## 2023-01-31 PROCEDURE — 27201012 HC FORCEPS, HOT/COLD, DISP: Mod: HCNC | Performed by: INTERNAL MEDICINE

## 2023-01-31 PROCEDURE — 43239 PR EGD, FLEX, W/BIOPSY, SGL/MULTI: ICD-10-PCS | Mod: HCNC,,, | Performed by: INTERNAL MEDICINE

## 2023-01-31 PROCEDURE — 43239 EGD BIOPSY SINGLE/MULTIPLE: CPT | Mod: HCNC | Performed by: INTERNAL MEDICINE

## 2023-01-31 PROCEDURE — 43239 EGD BIOPSY SINGLE/MULTIPLE: CPT | Mod: HCNC,,, | Performed by: INTERNAL MEDICINE

## 2023-01-31 RX ORDER — SODIUM CHLORIDE, SODIUM LACTATE, POTASSIUM CHLORIDE, CALCIUM CHLORIDE 600; 310; 30; 20 MG/100ML; MG/100ML; MG/100ML; MG/100ML
INJECTION, SOLUTION INTRAVENOUS CONTINUOUS
Status: DISCONTINUED | OUTPATIENT
Start: 2023-01-31 | End: 2023-01-31 | Stop reason: HOSPADM

## 2023-01-31 RX ORDER — PROPOFOL 10 MG/ML
VIAL (ML) INTRAVENOUS
Status: DISCONTINUED | OUTPATIENT
Start: 2023-01-31 | End: 2023-01-31

## 2023-01-31 RX ORDER — LIDOCAINE HYDROCHLORIDE 20 MG/ML
INJECTION INTRAVENOUS
Status: DISCONTINUED | OUTPATIENT
Start: 2023-01-31 | End: 2023-01-31

## 2023-01-31 RX ADMIN — PROPOFOL 50 MG: 10 INJECTION, EMULSION INTRAVENOUS at 07:01

## 2023-01-31 RX ADMIN — Medication 100 MG: at 07:01

## 2023-01-31 RX ADMIN — SODIUM CHLORIDE, POTASSIUM CHLORIDE, SODIUM LACTATE AND CALCIUM CHLORIDE: 600; 310; 30; 20 INJECTION, SOLUTION INTRAVENOUS at 07:01

## 2023-01-31 NOTE — H&P
Short Stay Endoscopy History and Physical    PCP - Loli Erwin MD  [place]  [address] [place] [address]    Procedure - Endoscopy  ASA - per anesthesia  Mallampati - per anesthesia  History of Anesthesia problems - no  Family history Anesthesia problems -  no   Plan of anesthesia - General    HPI  77 y.o. female  Reason for procedure: nausea          ROS:  Constitutional: No fevers, chills, No weight loss  CV: No chest pain  Pulm: No cough, No shortness of breath  GI: see HPI    Medical History:  has a past medical history of Anxiety, Anxiety, Cataract (lens) fragments in eye following cataract surgery, bilateral, DM (diabetes mellitus) (2013), DM (diabetes mellitus) (2013), DM2 (diabetes mellitus, type 2) (01/13), GERD (gastroesophageal reflux disease), Hiatal hernia, Mild nonproliferative diabetic retinopathy of both eyes (1/22/15), Mild nonproliferative diabetic retinopathy of both eyes (1/22/2015), and Osteoarthritis.    Surgical History:  has a past surgical history that includes Bunionectomy; TONSILLECTOMY, ADENOIDECTOMY; Cholecystectomy (05/12); Colonoscopy (08); Colonoscopy (N/A, 12/6/2016); Breast surgery (Bilateral, 1992); PCIOL  (Right, 12/12/2018); Augmentation of breast; Cataract extraction w/  intraocular lens implant (Bilateral); and Colonoscopy (N/A, 10/19/2022).    Family History: family history includes Cancer in her brother; Coronary artery disease in her brother; Diabetes in her maternal grandmother and mother; Heart failure in her brother, father, and mother; Hypertension in her brother, mother, and son; Stroke in her mother; Suicide in her father and son..    Social History:  reports that she has never smoked. She has never used smokeless tobacco. She reports current alcohol use. She reports that she does not use drugs.    Review of patient's allergies indicates:   Allergen Reactions    Hydrocodone Nausea And Vomiting     Once only -  on empty stomach       Medications:   Medications Prior to Admission   Medication Sig Dispense Refill Last Dose    aspirin (ECOTRIN) 81 MG EC tablet Take 81 mg by mouth once daily.   1/30/2023    buPROPion (WELLBUTRIN XL) 150 MG TB24 tablet Take 1 tablet (150 mg total) by mouth once daily. 90 tablet 3 1/30/2023    cholecalciferol, vitamin D3, (VITAMIN D3) 25 mcg (1,000 unit) capsule Take 1,000 Units by mouth once daily.   1/30/2023    clonazePAM (KLONOPIN) 0.5 MG tablet 1/2 to 1 up to BID prn severe anxiety 30 tablet 0 1/30/2023    cyanocobalamin (VITAMIN B-12) 1000 MCG tablet Take 100 mcg by mouth once daily. Taking a B-complex   1/30/2023    fish oil-omega-3 fatty acids 300-1,000 mg capsule Take 2 g by mouth once daily.   1/30/2023    fluticasone propionate (FLONASE) 50 mcg/actuation nasal spray 1 spray in each nostril 15.8 mL 2 1/30/2023    levocetirizine (XYZAL) 5 MG tablet Take 1 tablet (5 mg total) by mouth every evening. 30 tablet 11 1/30/2023    lysine 500 mg Tab Take 500 mg by mouth once daily.   1/30/2023    metFORMIN (GLUCOPHAGE-XR) 500 MG ER 24hr tablet TAKE 2 TABLETS TWICE DAILY 360 tablet 1 1/30/2023    montelukast (SINGULAIR) 10 mg tablet 1 tablet in the evening   1/30/2023    multivitamin capsule Take 1 capsule by mouth once daily.   1/30/2023    ondansetron (ZOFRAN) 4 MG tablet Take 1 tablet (4 mg total) by mouth every 6 (six) hours as needed for Nausea. 20 tablet 0 1/30/2023    oxybutynin (DITROPAN) 5 MG Tab Take 1 tablet (5 mg total) by mouth 2 (two) times daily. 180 tablet 3 1/30/2023    pantoprazole (PROTONIX) 40 MG tablet Take 1 tablet (40 mg total) by mouth once daily. 90 tablet 1 1/30/2023    paroxetine (PAXIL) 30 MG tablet TAKE 1 TABLET (30 MG TOTAL) BY MOUTH EVERY MORNING. 90 tablet 1 1/30/2023    PHENAZOPYRIDINE HCL (AZO ORAL) Take 1 tablet by mouth daily as needed.    1/30/2023    pravastatin (PRAVACHOL) 40 MG tablet Take 1 tablet (40 mg total) by mouth once daily. 90 tablet 3 1/30/2023     traZODone (DESYREL) 50 MG tablet Take 1 tablet (50 mg total) by mouth every evening. 90 tablet 2 1/30/2023    blood sugar diagnostic (BLOOD GLUCOSE TEST) Strp 1 each by Misc.(Non-Drug; Combo Route) route once daily. One touch verio IQ (Patient not taking: Reported on 1/12/2023) 100 each 4     blood-glucose meter kit Use as instructed (Patient not taking: Reported on 1/12/2023) 1 each 0     lancets (ONE TOUCH DELICA LANCETS) 33 gauge Misc 1 lancet by Misc.(Non-Drug; Combo Route) route 2 (two) times daily. 200 each 3        Physical Exam:    Vital Signs:   Vitals:    01/31/23 0644   BP: (!) 168/79   Pulse: 70   Resp: 14   Temp: 97.9 °F (36.6 °C)       General Appearance: Well appearing in no acute distress  Abdomen: Soft, non tender, non distended with normal bowel sounds, no masses    Labs:  Lab Results   Component Value Date    WBC 5.60 08/19/2022    HGB 13.4 08/19/2022    HCT 39.9 08/19/2022     08/19/2022    CHOL 107 (L) 08/19/2022    TRIG 48 08/19/2022    HDL 54 08/19/2022    ALT 14 08/19/2022    AST 17 08/19/2022     08/19/2022    K 4.2 08/19/2022     08/19/2022    CREATININE 0.6 08/19/2022    BUN 12 08/19/2022    CO2 29 08/19/2022    HGBA1C 5.4 08/19/2022       I have explained the risks and benefits of this endoscopic procedure to the patient including but not limited to bleeding, inflammation, infection, perforation, and death.    SEDATION PLAN: per anesthesia      History reviewed, vital signs satisfactory, cardiopulmonary status satisfactory, sedation options, risks and plans have been discussed with the patient  All their questions were answered and the patient agrees to the sedation procedures as planned and the patient is deemed an appropriate candidate for the sedation as planned.    Procedure explained to patient, informed consent obtained and placed in chart.        Harvinder Head MD

## 2023-01-31 NOTE — ANESTHESIA PREPROCEDURE EVALUATION
01/31/2023  Connie Fields is a 77 y.o., female.      Pre-op Assessment    I have reviewed the Patient Summary Reports.     I have reviewed the Nursing Notes. I have reviewed the NPO Status.   I have reviewed the Medications.     Review of Systems  Anesthesia Hx:  No problems with previous Anesthesia    Social:  Non-Smoker, No Alcohol Use    Cardiovascular:   Exercise tolerance: good hyperlipidemia    Pulmonary:   Denies Sleep Apnea. snoring   Renal/:  Renal/ Normal     Hepatic/GI:   Hiatal Hernia, GERD, well controlled    Musculoskeletal:  Musculoskeletal Normal    Neurological:  Neurology Normal    Endocrine:   Denies Diabetes. States prediabetic takes metformin    Psych:   Psychiatric History anxiety depression          Physical Exam  General: Well nourished, Cooperative, Alert and Oriented    Airway:  Mallampati: I   Mouth Opening: Normal  TM Distance: Normal  Tongue: Normal  Neck ROM: Normal ROM    Dental:  Intact  Non-removal lower bridge       Anesthesia Plan  Type of Anesthesia, risks & benefits discussed:    Anesthesia Type: MAC  Intra-op Monitoring Plan: Standard ASA Monitors  Post Op Pain Control Plan: IV/PO Opioids PRN  Induction:  IV  Informed Consent: Informed consent signed with the Patient and all parties understand the risks and agree with anesthesia plan.  All questions answered.   ASA Score: 2  Day of Surgery Review of History & Physical: H&P Update referred to the surgeon/provider.I have interviewed and examined the patient. I have reviewed the patient's H&P dated: There are no significant changes.     Ready For Surgery From Anesthesia Perspective.     .

## 2023-01-31 NOTE — ANESTHESIA POSTPROCEDURE EVALUATION
Anesthesia Post Evaluation    Patient: Connie Fields    Procedure(s) Performed: Procedure(s) (LRB):  EGD (ESOPHAGOGASTRODUODENOSCOPY) (N/A)    Final Anesthesia Type: MAC      Patient location during evaluation: GI PACU  Patient participation: Yes- Able to Participate  Level of consciousness: awake and alert  Post-procedure vital signs: reviewed and stable  Pain management: adequate  Airway patency: patent  CAROLYNN mitigation strategies: Multimodal analgesia  PONV status at discharge: No PONV  Anesthetic complications: no      Cardiovascular status: hemodynamically stable  Respiratory status: unassisted, room air and spontaneous ventilation  Hydration status: euvolemic  Follow-up not needed.          Vitals Value Taken Time   /74 01/31/23 0818   Temp 37 °C (98.6 °F) 01/31/23 0819   Pulse 70 01/31/23 0818   Resp 15 01/31/23 0818   SpO2 94 % 01/31/23 0818         Event Time   Out of Recovery 08:41:00         Pain/Carmen Score: Carmen Score: 10 (1/31/2023  8:18 AM)

## 2023-01-31 NOTE — DISCHARGE SUMMARY
[facility] Endoscopy (Hospital)  Discharge Note  Short Stay    Procedure(s) (LRB):  EGD (ESOPHAGOGASTRODUODENOSCOPY) (N/A)      OUTCOME: Patient tolerated treatment/procedure well without complication and is now ready for discharge.    DISPOSITION: Home or Self Care    FINAL DIAGNOSIS:  Nausea    FOLLOWUP: With primary care provider    DISCHARGE INSTRUCTIONS:  No discharge procedures on file.     TIME SPENT ON DISCHARGE: 20   minutes

## 2023-01-31 NOTE — PLAN OF CARE
Dr mane came to bedside to discuss findings. Vital signs stable, no patient c/o nausea/vomitting, no abdominal pain, no gi bleeding. Patient to be discharged from unit.

## 2023-01-31 NOTE — PROVATION PATIENT INSTRUCTIONS
Discharge Summary/Instructions after an Endoscopic Procedure  Patient Name: Connie Fields  Patient MRN: [id number]  Patient YOB: 1945 Tuesday, January 31, 2023 Harvinder Head MD  Dear patient,  As a result of recent federal legislation (The Federal Cures Act), you may   receive lab or pathology results from your procedure in your [facility]   account before your physician is able to contact you. Your physician or   their representative will relay the results to you with their   recommendations at their soonest availability.  Thank you,  RESTRICTIONS:  During your procedure today, you received medications for sedation.  These   medications may affect your judgment, balance and coordination.  Therefore,   for 24 hours, you have the following restrictions:   - DO NOT drive a car, operate machinery, make legal/financial decisions,   sign important papers or drink alcohol.    ACTIVITY:  Today: no heavy lifting, straining or running due to procedural   sedation/anesthesia.  The following day: return to full activity including work.  DIET:  Eat and drink normally unless instructed otherwise.     TREATMENT FOR COMMON SIDE EFFECTS:  - Mild abdominal pain, nausea, belching, bloating or excessive gas:  rest,   eat lightly and use a heating pad.  - Sore Throat: treat with throat lozenges and/or gargle with warm salt   water.  - Because air was used during the procedure, expelling large amounts of air   from your rectum or belching is normal.  - If a bowel prep was taken, you may not have a bowel movement for 1-3 days.    This is normal.  SYMPTOMS TO WATCH FOR AND REPORT TO YOUR PHYSICIAN:  1. Abdominal pain or bloating, other than gas cramps.  2. Chest pain.  3. Back pain.  4. Signs of infection such as: chills or fever occurring within 24 hours   after the procedure.  5. Rectal bleeding, which would show as bright red, maroon, or black stools.   (A tablespoon of blood from the rectum is not serious,  especially if   hemorrhoids are present.)  6. Vomiting.  7. Weakness or dizziness.  GO DIRECTLY TO THE NEAREST EMERGENCY ROOM IF YOU HAVE ANY OF THE FOLLOWING:      Difficulty breathing              Chills and/or fever over 101 F   Persistent vomiting and/or vomiting blood   Severe abdominal pain   Severe chest pain   Black, tarry stools   Bleeding- more than one tablespoon   Any other symptom or condition that you feel may need urgent attention  Your doctor recommends these additional instructions:  If any biopsies were taken, your doctors clinic will contact you in 1 to 2   weeks with any results.  - Discharge patient to home (via wheelchair).   - Resume previous diet. PPI daily. Avoid NSAIDs  - Continue present medications.   - Await pathology results.   - Return to referring physician as previously scheduled.  For questions, problems or results please call your physician Harvinder Head MD at Work:  [phone]  If you have any questions about the above instructions, call the GI   department at [phone] or call the endoscopy unit at [phone]   from 7am until 3 pm.  [facility] [place], EMERGENCY ROOM PHONE NUMBER:   [phone]  IF A COMPLICATION OR EMERGENCY SITUATION ARISES AND YOU ARE UNABLE TO REACH   YOUR PHYSICIAN - GO DIRECTLY TO THE EMERGENCY ROOM.  I have read or have had read to me these discharge instructions for my   procedure and have received a written copy.  I understand these   instructions and will follow-up with my physician if I have any questions.     __________________________________       _____________________________________  Nurse Signature                                          Patient/Designated   Responsible Party Signature  MD Harvnider Bourgeois MD  1/31/2023 7:59:37 AM  This report has been verified and signed electronically.  Dear patient,  As a result of recent federal legislation (The Federal Cures Act), you may    receive lab or pathology results from your procedure in your [facility]   account before your physician is able to contact you. Your physician or   their representative will relay the results to you with their   recommendations at their soonest availability.  Thank you,  PROVATION

## 2023-01-31 NOTE — TRANSFER OF CARE
"Anesthesia Transfer of Care Note    Patient: Connie Fields    Procedure(s) Performed: Procedure(s) (LRB):  EGD (ESOPHAGOGASTRODUODENOSCOPY) (N/A)    Patient location: GI    Anesthesia Type: MAC    Transport from OR: Transported from OR on room air with adequate spontaneous ventilation    Post pain: adequate analgesia    Post assessment: no apparent anesthetic complications    Post vital signs: stable    Level of consciousness: awake, alert and oriented    Nausea/Vomiting: no nausea/vomiting    Complications: none    Transfer of care protocol was followed      Last vitals:   Visit Vitals  BP (!) 168/79 (BP Location: Left arm, Patient Position: Lying)   Pulse 70   Temp 36.6 °C (97.9 °F)   Resp 14   Ht 5' 5" (1.651 m)   Wt 63.5 kg (140 lb)   SpO2 96%   Breastfeeding No   BMI 23.30 kg/m²     "

## 2023-02-01 VITALS
BODY MASS INDEX: 23.32 KG/M2 | HEART RATE: 70 BPM | WEIGHT: 140 LBS | DIASTOLIC BLOOD PRESSURE: 74 MMHG | RESPIRATION RATE: 15 BRPM | TEMPERATURE: 99 F | OXYGEN SATURATION: 94 % | HEIGHT: 65 IN | SYSTOLIC BLOOD PRESSURE: 130 MMHG

## 2023-02-01 DIAGNOSIS — K21.9 GASTROESOPHAGEAL REFLUX DISEASE, UNSPECIFIED WHETHER ESOPHAGITIS PRESENT: ICD-10-CM

## 2023-02-01 RX ORDER — PANTOPRAZOLE SODIUM 40 MG/1
40 TABLET, DELAYED RELEASE ORAL DAILY
Qty: 90 TABLET | Refills: 1 | Status: CANCELLED | OUTPATIENT
Start: 2023-02-01 | End: 2024-02-01

## 2023-02-06 LAB
FINAL PATHOLOGIC DIAGNOSIS: NORMAL
Lab: NORMAL

## 2023-02-07 DIAGNOSIS — Z00.00 ENCOUNTER FOR MEDICARE ANNUAL WELLNESS EXAM: ICD-10-CM

## 2023-02-08 ENCOUNTER — OFFICE VISIT (OUTPATIENT)
Dept: GASTROENTEROLOGY | Facility: CLINIC | Age: 78
End: 2023-02-08
Payer: MEDICARE

## 2023-02-08 VITALS
OXYGEN SATURATION: 99 % | HEART RATE: 78 BPM | DIASTOLIC BLOOD PRESSURE: 70 MMHG | SYSTOLIC BLOOD PRESSURE: 126 MMHG | BODY MASS INDEX: 23.88 KG/M2 | WEIGHT: 143.31 LBS | HEIGHT: 65 IN

## 2023-02-08 DIAGNOSIS — R11.0 NAUSEA: ICD-10-CM

## 2023-02-08 DIAGNOSIS — K44.9 HIATAL HERNIA: ICD-10-CM

## 2023-02-08 DIAGNOSIS — F41.1 GENERALIZED ANXIETY DISORDER: Primary | ICD-10-CM

## 2023-02-08 PROCEDURE — 1126F PR PAIN SEVERITY QUANTIFIED, NO PAIN PRESENT: ICD-10-PCS | Mod: HCNC,CPTII,S$GLB, | Performed by: PHYSICIAN ASSISTANT

## 2023-02-08 PROCEDURE — 3078F PR MOST RECENT DIASTOLIC BLOOD PRESSURE < 80 MM HG: ICD-10-PCS | Mod: HCNC,CPTII,S$GLB, | Performed by: PHYSICIAN ASSISTANT

## 2023-02-08 PROCEDURE — 1159F PR MEDICATION LIST DOCUMENTED IN MEDICAL RECORD: ICD-10-PCS | Mod: HCNC,CPTII,S$GLB, | Performed by: PHYSICIAN ASSISTANT

## 2023-02-08 PROCEDURE — 3078F DIAST BP <80 MM HG: CPT | Mod: HCNC,CPTII,S$GLB, | Performed by: PHYSICIAN ASSISTANT

## 2023-02-08 PROCEDURE — 99999 PR PBB SHADOW E&M-EST. PATIENT-LVL V: ICD-10-PCS | Mod: PBBFAC,HCNC,, | Performed by: PHYSICIAN ASSISTANT

## 2023-02-08 PROCEDURE — 3074F SYST BP LT 130 MM HG: CPT | Mod: HCNC,CPTII,S$GLB, | Performed by: PHYSICIAN ASSISTANT

## 2023-02-08 PROCEDURE — 99999 PR PBB SHADOW E&M-EST. PATIENT-LVL V: CPT | Mod: PBBFAC,HCNC,, | Performed by: PHYSICIAN ASSISTANT

## 2023-02-08 PROCEDURE — 3074F PR MOST RECENT SYSTOLIC BLOOD PRESSURE < 130 MM HG: ICD-10-PCS | Mod: HCNC,CPTII,S$GLB, | Performed by: PHYSICIAN ASSISTANT

## 2023-02-08 PROCEDURE — 99213 PR OFFICE/OUTPT VISIT, EST, LEVL III, 20-29 MIN: ICD-10-PCS | Mod: HCNC,S$GLB,, | Performed by: PHYSICIAN ASSISTANT

## 2023-02-08 PROCEDURE — 1159F MED LIST DOCD IN RCRD: CPT | Mod: HCNC,CPTII,S$GLB, | Performed by: PHYSICIAN ASSISTANT

## 2023-02-08 PROCEDURE — 99213 OFFICE O/P EST LOW 20 MIN: CPT | Mod: HCNC,S$GLB,, | Performed by: PHYSICIAN ASSISTANT

## 2023-02-08 PROCEDURE — 1126F AMNT PAIN NOTED NONE PRSNT: CPT | Mod: HCNC,CPTII,S$GLB, | Performed by: PHYSICIAN ASSISTANT

## 2023-02-08 NOTE — PROGRESS NOTES
Clinic Consult:  [facility] Consultation Note    Reason for Consult:  The primary encounter diagnosis was Generalized anxiety disorder. Diagnoses of Nausea and Hiatal hernia were also pertinent to this visit.    PCP: Loli Erwin   [address] / [place] [address]    CC: Nausea and Gastroesophageal Reflux (Had endoscopy Jan 31,2023-hiatal hernia-prescribed pantoprazole which has helped. )      HPI:  This is a 77 y.o. female with history of anxiety here for evaluation of the above. Referred for nausea and indigestion. Patient reports this has occurred on and off in the past. Has been more prominent recently. EGD was completed with findings of small hiatal hernia, erosive gastropathy with stigmata of recent bleeding. Pathology showed no sign of infection or dysplasia. She has been started on Protonix 40mg daily. She has seen some improvement in her symptoms since starting. She notices that stress worsens her symptoms. She reports many current life stressors.   UTD on colonoscopy.       Review of Systems   Constitutional:  Negative for activity change, appetite change, chills, diaphoresis, fatigue, fever and unexpected weight change.   HENT:  Negative for trouble swallowing.    Respiratory:  Negative for cough and shortness of breath.    Cardiovascular:  Negative for chest pain.   Gastrointestinal:  Positive for nausea (improving). Negative for abdominal distention, abdominal pain, anal bleeding, blood in stool, constipation, diarrhea and vomiting.   Skin:  Negative for color change and pallor.   Neurological:  Negative for dizziness, weakness and light-headedness.   Psychiatric/Behavioral:  Positive for dysphoric mood. The patient is nervous/anxious.       Medical History:   Past Medical History:   Diagnosis Date    Anxiety     Anxiety     Cataract (lens) fragments in eye following cataract surgery, bilateral     DM (diabetes mellitus) 2013     10/21/2018    DM (diabetes  mellitus) 2013     am 12/09/2019    DM2 (diabetes mellitus, type 2) 01/13    GERD (gastroesophageal reflux disease)     Hiatal hernia     Mild nonproliferative diabetic retinopathy of both eyes 1/22/15    OS>OD    Mild nonproliferative diabetic retinopathy of both eyes 1/22/2015    OS>OD     Osteoarthritis        Surgical History:   Past Surgical History:   Procedure Laterality Date    AUGMENTATION OF BREAST      saline    BREAST SURGERY Bilateral 1992    saline implants    BUNIONECTOMY      CATARACT EXTRACTION W/  INTRAOCULAR LENS IMPLANT Bilateral     12/12/18 OD, 1/9/19 OS, Dr Higuera    CHOLECYSTECTOMY  05/12    COLONOSCOPY  08    1 POLYP    COLONOSCOPY N/A 12/6/2016    Procedure: COLONOSCOPY with biopsies;  Surgeon: Sarah Schmidt MD;  Location: [facility] [place] [facility];  Service: Endoscopy;  Laterality: N/A;    COLONOSCOPY N/A 10/19/2022    Procedure: COLONOSCOPY;  Surgeon: Lindsey Gregory MD;  Location: [facility];  Service: Endoscopy;  Laterality: N/A;    ESOPHAGOGASTRODUODENOSCOPY N/A 1/31/2023    Procedure: EGD (ESOPHAGOGASTRODUODENOSCOPY);  Surgeon: Harvinder Head MD;  Location: [facility];  Service: Endoscopy;  Laterality: N/A;    PCIOL  Right 12/12/2018    DR. HIGUERA    TONSILLECTOMY, ADENOIDECTOMY         Family History:    Family History   Problem Relation Age of Onset    Heart failure Father     Suicide Father     Diabetes Mother     Heart failure Mother     Hypertension Mother     Stroke Mother     Heart failure Brother     Cancer Brother         full body    Hypertension Brother     Coronary artery disease Brother     Diabetes Maternal Grandmother     Hypertension Son     Suicide Son        Social History:   Social History     Socioeconomic History    Marital status:    Tobacco Use    Smoking status: Never    Smokeless tobacco: Never   Substance and Sexual Activity    Alcohol use: Yes     Comment: occasionally    Drug use: No    Sexual activity: Yes     Partners: Male       Allergies:    Review of patient's allergies indicates:   Allergen Reactions    Hydrocodone Nausea And Vomiting     Once only - on empty stomach       Home Medications:   Current Outpatient Medications on File Prior to Visit   Medication Sig Dispense Refill    aspirin (ECOTRIN) 81 MG EC tablet Take 81 mg by mouth once daily.      blood sugar diagnostic (BLOOD GLUCOSE TEST) Strp 1 each by Misc.(Non-Drug; Combo Route) route once daily. One touch verio  each 4    blood-glucose meter kit Use as instructed 1 each 0    buPROPion (WELLBUTRIN XL) 150 MG TB24 tablet Take 1 tablet (150 mg total) by mouth once daily. 90 tablet 3    cholecalciferol, vitamin D3, (VITAMIN D3) 25 mcg (1,000 unit) capsule Take 1,000 Units by mouth once daily.      clonazePAM (KLONOPIN) 0.5 MG tablet 1/2 to 1 up to BID prn severe anxiety 30 tablet 0    cyanocobalamin (VITAMIN B-12) 1000 MCG tablet Take 100 mcg by mouth once daily. Taking a B-complex      fish oil-omega-3 fatty acids 300-1,000 mg capsule Take 2 g by mouth once daily.      fluticasone propionate (FLONASE) 50 mcg/actuation nasal spray 1 spray in each nostril 15.8 mL 2    lancets (ONE TOUCH DELICA LANCETS) 33 gauge Misc 1 lancet by Misc.(Non-Drug; Combo Route) route 2 (two) times daily. 200 each 3    levocetirizine (XYZAL) 5 MG tablet Take 1 tablet (5 mg total) by mouth every evening. 30 tablet 11    lysine 500 mg Tab Take 500 mg by mouth once daily.      metFORMIN (GLUCOPHAGE-XR) 500 MG ER 24hr tablet TAKE 2 TABLETS TWICE DAILY 360 tablet 1    montelukast (SINGULAIR) 10 mg tablet 1 tablet in the evening      multivitamin capsule Take 1 capsule by mouth once daily.      ondansetron (ZOFRAN) 4 MG tablet Take 1 tablet (4 mg total) by mouth every 6 (six) hours as needed for Nausea. 20 tablet 0    oxybutynin (DITROPAN) 5 MG Tab Take 1 tablet (5 mg total) by mouth 2 (two) times daily. 180 tablet 3    pantoprazole (PROTONIX) 40 MG tablet Take 1 tablet (40 mg total) by mouth once daily. 90 tablet 1  "   paroxetine (PAXIL) 30 MG tablet TAKE 1 TABLET (30 MG TOTAL) BY MOUTH EVERY MORNING. 90 tablet 1    PHENAZOPYRIDINE HCL (AZO ORAL) Take 1 tablet by mouth daily as needed.       pravastatin (PRAVACHOL) 40 MG tablet Take 1 tablet (40 mg total) by mouth once daily. 90 tablet 3    traZODone (DESYREL) 50 MG tablet Take 1 tablet (50 mg total) by mouth every evening. 90 tablet 2     No current facility-administered medications on file prior to visit.       Physical Exam:  /70   Pulse 78   Ht 5' 5" (1.651 m)   Wt 65 kg (143 lb 4.8 oz)   SpO2 99%   BMI 23.85 kg/m²   Body mass index is 23.85 kg/m².  Physical Exam  Constitutional:       General: She is not in acute distress.     Appearance: Normal appearance. She is not ill-appearing, toxic-appearing or diaphoretic.   HENT:      Head: Normocephalic and atraumatic.   Eyes:      General: No scleral icterus.     Extraocular Movements: Extraocular movements intact.   Cardiovascular:      Rate and Rhythm: Normal rate and regular rhythm.   Pulmonary:      Effort: Pulmonary effort is normal. No respiratory distress.      Breath sounds: Normal breath sounds.   Abdominal:      General: Bowel sounds are normal. There is no distension.      Palpations: Abdomen is soft.      Tenderness: There is no abdominal tenderness. There is no guarding.   Musculoskeletal:         General: Normal range of motion.      Cervical back: Normal range of motion.   Skin:     General: Skin is warm and dry.      Coloration: Skin is not jaundiced or pale.   Neurological:      Mental Status: She is alert and oriented to person, place, and time.         Labs: Pertinent labs reviewed.  Endoscopy: 1/2023  CRC Screening: 10/2022  Anticoagulation: --    Assessment:  1. Generalized anxiety disorder    2. Nausea    3. Hiatal hernia         Recommendations:  -EGD with findings of erosions with recent bleeding. Agree with staying on Protonix 40mg x 3 months. She continues with some breakthough reflux " symptoms when lying down at night. Can consider taking Pepcid OTC before bed.   -Symptoms improving while on Protonix.  -She feels like stress worsens her symptoms. Would recommend psychology vs. Psychiatry follow up. Discussed with  patient.   -Follow up 3 months. If doing well, can consider dropping Protonix to 20mg daily.   -GERD diet discussed.    Generalized anxiety disorder    Nausea  -     Ambulatory referral/consult to Gastroenterology    Hiatal hernia  -     Ambulatory referral/consult to Gastroenterology        No follow-ups on file.    Thank you so much for allowing me to participate in the care of Connie Rachel Wood PA-C

## 2023-02-09 DIAGNOSIS — Z00.00 ENCOUNTER FOR MEDICARE ANNUAL WELLNESS EXAM: ICD-10-CM

## 2023-03-01 ENCOUNTER — LAB VISIT (OUTPATIENT)
Dept: LAB | Facility: HOSPITAL | Age: 78
End: 2023-03-01
Attending: FAMILY MEDICINE
Payer: MEDICARE

## 2023-03-01 DIAGNOSIS — E11.8 CONTROLLED TYPE 2 DIABETES MELLITUS WITH COMPLICATION, WITHOUT LONG-TERM CURRENT USE OF INSULIN: ICD-10-CM

## 2023-03-01 LAB
ESTIMATED AVG GLUCOSE: 111 MG/DL (ref 68–131)
HBA1C MFR BLD: 5.5 % (ref 4–5.6)

## 2023-03-01 PROCEDURE — 36415 COLL VENOUS BLD VENIPUNCTURE: CPT | Mod: HCNC | Performed by: FAMILY MEDICINE

## 2023-03-01 PROCEDURE — 83036 HEMOGLOBIN GLYCOSYLATED A1C: CPT | Mod: HCNC | Performed by: FAMILY MEDICINE

## 2023-03-02 ENCOUNTER — TELEPHONE (OUTPATIENT)
Dept: INTERNAL MEDICINE | Facility: CLINIC | Age: 78
End: 2023-03-02
Payer: MEDICARE

## 2023-03-08 ENCOUNTER — OFFICE VISIT (OUTPATIENT)
Dept: INTERNAL MEDICINE | Facility: CLINIC | Age: 78
End: 2023-03-08
Payer: MEDICARE

## 2023-03-08 VITALS
BODY MASS INDEX: 23.55 KG/M2 | WEIGHT: 141.56 LBS | RESPIRATION RATE: 18 BRPM | HEART RATE: 86 BPM | TEMPERATURE: 98 F | OXYGEN SATURATION: 99 % | DIASTOLIC BLOOD PRESSURE: 76 MMHG | SYSTOLIC BLOOD PRESSURE: 120 MMHG

## 2023-03-08 DIAGNOSIS — F43.21 GRIEF: ICD-10-CM

## 2023-03-08 DIAGNOSIS — E11.69 HYPERLIPIDEMIA ASSOCIATED WITH TYPE 2 DIABETES MELLITUS: ICD-10-CM

## 2023-03-08 DIAGNOSIS — E78.5 HYPERLIPIDEMIA ASSOCIATED WITH TYPE 2 DIABETES MELLITUS: ICD-10-CM

## 2023-03-08 DIAGNOSIS — E11.8 CONTROLLED TYPE 2 DIABETES MELLITUS WITH COMPLICATION, WITHOUT LONG-TERM CURRENT USE OF INSULIN: Primary | ICD-10-CM

## 2023-03-08 DIAGNOSIS — F43.0 STRESS REACTION: ICD-10-CM

## 2023-03-08 DIAGNOSIS — G47.34 NOCTURNAL HYPOXEMIA: ICD-10-CM

## 2023-03-08 PROCEDURE — 1126F PR PAIN SEVERITY QUANTIFIED, NO PAIN PRESENT: ICD-10-PCS | Mod: HCNC,CPTII,S$GLB, | Performed by: FAMILY MEDICINE

## 2023-03-08 PROCEDURE — 1101F PT FALLS ASSESS-DOCD LE1/YR: CPT | Mod: HCNC,CPTII,S$GLB, | Performed by: FAMILY MEDICINE

## 2023-03-08 PROCEDURE — 1160F RVW MEDS BY RX/DR IN RCRD: CPT | Mod: HCNC,CPTII,S$GLB, | Performed by: FAMILY MEDICINE

## 2023-03-08 PROCEDURE — 3288F PR FALLS RISK ASSESSMENT DOCUMENTED: ICD-10-PCS | Mod: HCNC,CPTII,S$GLB, | Performed by: FAMILY MEDICINE

## 2023-03-08 PROCEDURE — 3074F PR MOST RECENT SYSTOLIC BLOOD PRESSURE < 130 MM HG: ICD-10-PCS | Mod: HCNC,CPTII,S$GLB, | Performed by: FAMILY MEDICINE

## 2023-03-08 PROCEDURE — 3288F FALL RISK ASSESSMENT DOCD: CPT | Mod: HCNC,CPTII,S$GLB, | Performed by: FAMILY MEDICINE

## 2023-03-08 PROCEDURE — 1159F MED LIST DOCD IN RCRD: CPT | Mod: HCNC,CPTII,S$GLB, | Performed by: FAMILY MEDICINE

## 2023-03-08 PROCEDURE — 1126F AMNT PAIN NOTED NONE PRSNT: CPT | Mod: HCNC,CPTII,S$GLB, | Performed by: FAMILY MEDICINE

## 2023-03-08 PROCEDURE — 99214 OFFICE O/P EST MOD 30 MIN: CPT | Mod: HCNC,S$GLB,, | Performed by: FAMILY MEDICINE

## 2023-03-08 PROCEDURE — 3074F SYST BP LT 130 MM HG: CPT | Mod: HCNC,CPTII,S$GLB, | Performed by: FAMILY MEDICINE

## 2023-03-08 PROCEDURE — 3078F PR MOST RECENT DIASTOLIC BLOOD PRESSURE < 80 MM HG: ICD-10-PCS | Mod: HCNC,CPTII,S$GLB, | Performed by: FAMILY MEDICINE

## 2023-03-08 PROCEDURE — 3078F DIAST BP <80 MM HG: CPT | Mod: HCNC,CPTII,S$GLB, | Performed by: FAMILY MEDICINE

## 2023-03-08 PROCEDURE — 99214 PR OFFICE/OUTPT VISIT, EST, LEVL IV, 30-39 MIN: ICD-10-PCS | Mod: HCNC,S$GLB,, | Performed by: FAMILY MEDICINE

## 2023-03-08 PROCEDURE — 99999 PR PBB SHADOW E&M-EST. PATIENT-LVL IV: CPT | Mod: PBBFAC,HCNC,, | Performed by: FAMILY MEDICINE

## 2023-03-08 PROCEDURE — 99999 PR PBB SHADOW E&M-EST. PATIENT-LVL IV: ICD-10-PCS | Mod: PBBFAC,HCNC,, | Performed by: FAMILY MEDICINE

## 2023-03-08 PROCEDURE — 1160F PR REVIEW ALL MEDS BY PRESCRIBER/CLIN PHARMACIST DOCUMENTED: ICD-10-PCS | Mod: HCNC,CPTII,S$GLB, | Performed by: FAMILY MEDICINE

## 2023-03-08 PROCEDURE — 1101F PR PT FALLS ASSESS DOC 0-1 FALLS W/OUT INJ PAST YR: ICD-10-PCS | Mod: HCNC,CPTII,S$GLB, | Performed by: FAMILY MEDICINE

## 2023-03-08 PROCEDURE — 1159F PR MEDICATION LIST DOCUMENTED IN MEDICAL RECORD: ICD-10-PCS | Mod: HCNC,CPTII,S$GLB, | Performed by: FAMILY MEDICINE

## 2023-03-08 RX ORDER — BUPROPION HYDROCHLORIDE 300 MG/1
300 TABLET ORAL DAILY
Qty: 30 TABLET | Refills: 11 | Status: SHIPPED | OUTPATIENT
Start: 2023-03-08

## 2023-03-08 NOTE — Clinical Note
She really needs some grief counseling. I am sending this to you to see if you could find a way to get her in.

## 2023-03-08 NOTE — PROGRESS NOTES
Connie Fields  03/08/2023  [id number]    Loli Erwin MD  Patient Care Team:  Loli Erwin MD as PCP - General (Family Medicine)  Juventino Higuera MD as Consulting Physician (Ophthalmology)  JOAQUÍN WellsW as  (Psychiatry)  Tito Anderson, PENNY as Consulting Physician (Optometry)          Visit Type:a scheduled routine follow-up visit    Chief Complaint:  Chief Complaint   Patient presents with    Follow-up     Patient is following up from 9/7/22 visit for HLD, DM and has questions about depression. She is currently on Wellbutrin, paxil and klonopin. She lost her daughter-in-law about 3 weeks ago and a son to suicide several years ago.        History of Present Illness:  77 year old  Follow up    Dm.  Did HgA1c last week  Lab Results   Component Value Date    HGBA1C 5.5 03/01/2023     Very good control    She lost a family member 3 weeks ago  Triggered unresolved grief from when she lost her son in suicide 2 years ago.    She is having more withdrawn and not as much pleasure.  No SI.    Saw GI has gastritis      History:  Past Medical History:   Diagnosis Date    Anxiety     Anxiety     Cataract (lens) fragments in eye following cataract surgery, bilateral     DM (diabetes mellitus) 2013     10/21/2018    DM (diabetes mellitus) 2013     am 12/09/2019    DM2 (diabetes mellitus, type 2) 01/13    GERD (gastroesophageal reflux disease)     Hiatal hernia     Mild nonproliferative diabetic retinopathy of both eyes 1/22/15    OS>OD    Mild nonproliferative diabetic retinopathy of both eyes 1/22/2015    OS>OD     Osteoarthritis      Past Surgical History:   Procedure Laterality Date    AUGMENTATION OF BREAST      saline    BREAST SURGERY Bilateral 1992    saline implants    BUNIONECTOMY      CATARACT EXTRACTION W/  INTRAOCULAR LENS IMPLANT Bilateral     12/12/18 OD, 1/9/19 OS, Dr Higuera    CHOLECYSTECTOMY  05/12    COLONOSCOPY  08    1 POLYP    COLONOSCOPY N/A 12/6/2016     Procedure: COLONOSCOPY with biopsies;  Surgeon: Sarah Schmidt MD;  Location: [facility] [place] [facility];  Service: Endoscopy;  Laterality: N/A;    COLONOSCOPY N/A 10/19/2022    Procedure: COLONOSCOPY;  Surgeon: Lindsey Gregory MD;  Location: [facility] [place] ENDO;  Service: Endoscopy;  Laterality: N/A;    ESOPHAGOGASTRODUODENOSCOPY N/A 1/31/2023    Procedure: EGD (ESOPHAGOGASTRODUODENOSCOPY);  Surgeon: Harvinder Head MD;  Location: [facility] [place] ENDO;  Service: Endoscopy;  Laterality: N/A;    PCIOL  Right 12/12/2018    DR. JUAREZ    TONSILLECTOMY, ADENOIDECTOMY       Family History   Problem Relation Age of Onset    Heart failure Father     Suicide Father     Diabetes Mother     Heart failure Mother     Hypertension Mother     Stroke Mother     Heart failure Brother     Cancer Brother         full body    Hypertension Brother     Coronary artery disease Brother     Diabetes Maternal Grandmother     Hypertension Son     Suicide Son      Social History     Socioeconomic History    Marital status:    Tobacco Use    Smoking status: Never     Passive exposure: Never    Smokeless tobacco: Never   Substance and Sexual Activity    Alcohol use: Yes     Comment: occasionally    Drug use: No    Sexual activity: Yes     Partners: Male     Patient Active Problem List   Diagnosis    Generalized anxiety disorder    History of diabetic retinopathy Mild nonproliferative diabetic retinopathy of both eyes (None on eye exam 11/26/2018)    Mixed stress and urge urinary incontinence    Controlled type 2 diabetes mellitus with complication, without long-term current use of insulin    Nuclear sclerosis of both eyes    Diabetes mellitus type 2 without retinopathy    Hyperlipidemia associated with type 2 diabetes mellitus    Primary snoring    Nocturnal hypoxemia    Major depressive disorder, recurrent episode, moderate    Personal history of colonic polyps    Nausea     Review of patient's allergies indicates:   Allergen Reactions    Hydrocodone  Nausea And Vomiting     Once only - on empty stomach       The following were reviewed at this visit: active problem list, medication list, allergies, family history, social history, and health maintenance.    Medications:  Current Outpatient Medications on File Prior to Visit   Medication Sig Dispense Refill    aspirin (ECOTRIN) 81 MG EC tablet Take 81 mg by mouth once daily.      blood sugar diagnostic (BLOOD GLUCOSE TEST) Strp 1 each by Misc.(Non-Drug; Combo Route) route once daily. One touch verio  each 4    blood-glucose meter kit Use as instructed 1 each 0    buPROPion (WELLBUTRIN XL) 150 MG TB24 tablet Take 1 tablet (150 mg total) by mouth once daily. 90 tablet 3    cholecalciferol, vitamin D3, (VITAMIN D3) 25 mcg (1,000 unit) capsule Take 1,000 Units by mouth once daily.      clonazePAM (KLONOPIN) 0.5 MG tablet 1/2 to 1 up to BID prn severe anxiety 30 tablet 0    cyanocobalamin (VITAMIN B-12) 1000 MCG tablet Take 100 mcg by mouth once daily. Taking a B-complex      fish oil-omega-3 fatty acids 300-1,000 mg capsule Take 2 g by mouth once daily.      fluticasone propionate (FLONASE) 50 mcg/actuation nasal spray 1 spray in each nostril 15.8 mL 2    lancets (ONE TOUCH DELICA LANCETS) 33 gauge Misc 1 lancet by Misc.(Non-Drug; Combo Route) route 2 (two) times daily. 200 each 3    levocetirizine (XYZAL) 5 MG tablet Take 1 tablet (5 mg total) by mouth every evening. 30 tablet 11    lysine 500 mg Tab Take 500 mg by mouth once daily.      metFORMIN (GLUCOPHAGE-XR) 500 MG ER 24hr tablet TAKE 2 TABLETS TWICE DAILY 360 tablet 1    montelukast (SINGULAIR) 10 mg tablet 1 tablet in the evening      multivitamin capsule Take 1 capsule by mouth once daily.      ondansetron (ZOFRAN) 4 MG tablet Take 1 tablet (4 mg total) by mouth every 6 (six) hours as needed for Nausea. 20 tablet 0    oxybutynin (DITROPAN) 5 MG Tab Take 1 tablet (5 mg total) by mouth 2 (two) times daily. 180 tablet 3    pantoprazole (PROTONIX) 40 MG  tablet Take 1 tablet (40 mg total) by mouth once daily. 90 tablet 1    paroxetine (PAXIL) 30 MG tablet TAKE 1 TABLET (30 MG TOTAL) BY MOUTH EVERY MORNING. 90 tablet 1    PHENAZOPYRIDINE HCL (AZO ORAL) Take 1 tablet by mouth daily as needed.       pravastatin (PRAVACHOL) 40 MG tablet Take 1 tablet (40 mg total) by mouth once daily. 90 tablet 3    traZODone (DESYREL) 50 MG tablet Take 1 tablet (50 mg total) by mouth every evening. 90 tablet 2     No current facility-administered medications on file prior to visit.       Medications have been reviewed and reconciled with patient at this visit.  Barriers to medications reviewed with patient.    Adverse reactions to current medications reviewed with patient..    Over the counter medications reviewed and reconciled with patient.    Exam:  Wt Readings from Last 3 Encounters:   03/08/23 64.2 kg (141 lb 8.6 oz)   02/08/23 65 kg (143 lb 4.8 oz)   01/31/23 63.5 kg (140 lb)     Temp Readings from Last 3 Encounters:   03/08/23 97.9 °F (36.6 °C)   01/31/23 98.6 °F (37 °C) (Temporal)   01/12/23 98.2 °F (36.8 °C) (Tympanic)     BP Readings from Last 3 Encounters:   03/08/23 120/76   02/08/23 126/70   01/31/23 130/74     Pulse Readings from Last 3 Encounters:   03/08/23 86   02/08/23 78   01/31/23 70     Body mass index is 23.55 kg/m².      Review of Systems   Constitutional: Negative.  Negative for chills and fever.   HENT: Negative.  Negative for congestion, sinus pain and sore throat.    Eyes:  Negative for blurred vision and double vision.   Respiratory:  Negative for cough, sputum production, shortness of breath and wheezing.    Cardiovascular:  Negative for chest pain, palpitations and leg swelling.   Gastrointestinal:  Negative for abdominal pain, constipation, diarrhea, heartburn, nausea and vomiting.   Genitourinary: Negative.    Musculoskeletal: Negative.    Skin: Negative.  Negative for rash.   Neurological: Negative.    Endo/Heme/Allergies: Negative.  Negative for  polydipsia. Does not bruise/bleed easily.   Psychiatric/Behavioral:  Negative for depression and substance abuse.    Physical Exam  Nursing note reviewed.   Cardiovascular:      Rate and Rhythm: Normal rate and regular rhythm.   Pulmonary:      Effort: Pulmonary effort is normal. No respiratory distress.   Neurological:      Mental Status: She is alert and oriented to person, place, and time.   Psychiatric:         Mood and Affect: Mood normal.         Behavior: Behavior normal.         Thought Content: Thought content normal.         Judgment: Judgment normal.       Laboratory Reviewed ({Yes)  Lab Results   Component Value Date    WBC 5.60 08/19/2022    HGB 13.4 08/19/2022    HCT 39.9 08/19/2022     08/19/2022    CHOL 107 (L) 08/19/2022    TRIG 48 08/19/2022    HDL 54 08/19/2022    ALT 14 08/19/2022    AST 17 08/19/2022     08/19/2022    K 4.2 08/19/2022     08/19/2022    CREATININE 0.6 08/19/2022    BUN 12 08/19/2022    CO2 29 08/19/2022    HGBA1C 5.5 03/01/2023       Connie was seen today for follow-up.    Diagnoses and all orders for this visit:    Controlled type 2 diabetes mellitus with complication, without long-term current use of insulin  HgA1c is good    Hyperlipidemia associated with type 2 diabetes mellitus  LDL is good    Nocturnal hypoxemia  Stable    Grief  -     Ambulatory referral/consult to Psychology; Future        Dm remains well controlled  Continue statin  Recheck 6 months    Schedule eye exam    Care Plan/Goals: Reviewed    Goals    None         Follow up: No follow-ups on file.    After visit summary was printed and given to patient upon discharge today.  Patient goals and care plan are included in After Visit Summary.

## 2023-03-16 ENCOUNTER — PATIENT MESSAGE (OUTPATIENT)
Dept: PSYCHIATRY | Facility: CLINIC | Age: 78
End: 2023-03-16
Payer: MEDICARE

## 2023-04-12 ENCOUNTER — TELEPHONE (OUTPATIENT)
Dept: ADMINISTRATIVE | Facility: HOSPITAL | Age: 78
End: 2023-04-12
Payer: MEDICARE

## 2023-04-19 ENCOUNTER — PATIENT MESSAGE (OUTPATIENT)
Dept: OPHTHALMOLOGY | Facility: CLINIC | Age: 78
End: 2023-04-19

## 2023-04-19 ENCOUNTER — OFFICE VISIT (OUTPATIENT)
Dept: OPHTHALMOLOGY | Facility: CLINIC | Age: 78
End: 2023-04-19
Payer: MEDICARE

## 2023-04-19 DIAGNOSIS — E11.9 DIABETES MELLITUS TYPE 2 WITHOUT RETINOPATHY: Primary | ICD-10-CM

## 2023-04-19 DIAGNOSIS — Z96.1 PSEUDOPHAKIA OF BOTH EYES: ICD-10-CM

## 2023-04-19 DIAGNOSIS — H52.7 REFRACTIVE DISORDER: ICD-10-CM

## 2023-04-19 PROCEDURE — 1159F PR MEDICATION LIST DOCUMENTED IN MEDICAL RECORD: ICD-10-PCS | Mod: CPTII,S$GLB,, | Performed by: OPTOMETRIST

## 2023-04-19 PROCEDURE — 92014 COMPRE OPH EXAM EST PT 1/>: CPT | Mod: S$GLB,,, | Performed by: OPTOMETRIST

## 2023-04-19 PROCEDURE — 1159F MED LIST DOCD IN RCRD: CPT | Mod: CPTII,S$GLB,, | Performed by: OPTOMETRIST

## 2023-04-19 PROCEDURE — 99999 PR PBB SHADOW E&M-EST. PATIENT-LVL I: ICD-10-PCS | Mod: PBBFAC,,, | Performed by: OPTOMETRIST

## 2023-04-19 PROCEDURE — 92015 DETERMINE REFRACTIVE STATE: CPT | Mod: S$GLB,,, | Performed by: OPTOMETRIST

## 2023-04-19 PROCEDURE — 92015 PR REFRACTION: ICD-10-PCS | Mod: S$GLB,,, | Performed by: OPTOMETRIST

## 2023-04-19 PROCEDURE — 92014 PR EYE EXAM, EST PATIENT,COMPREHESV: ICD-10-PCS | Mod: S$GLB,,, | Performed by: OPTOMETRIST

## 2023-04-19 PROCEDURE — 99999 PR PBB SHADOW E&M-EST. PATIENT-LVL I: CPT | Mod: PBBFAC,,, | Performed by: OPTOMETRIST

## 2023-04-19 NOTE — PROGRESS NOTES
HPI     Diabetic Eye Exam     Additional comments: Lab Results       Component                Value               Date                       HGBA1C                   5.5                 03/01/2023                       Comments    Vision unchanged per pt.  No other complaints.          Last edited by Callie Garcia MA on 4/19/2023  9:12 AM.            Assessment /Plan     For exam results, see Encounter Report.    Diabetes mellitus type 2 without retinopathy    Pseudophakia of both eyes    Refractive disorder      No Background Diabetic Retinopathy  Strict BG control, f/u w/ PCP, and annual DFE  Stressed importance of DM control to preserve vision    Stable IOL OU.    Dispense Final Rx for glasses or may use OTC glasses.  RTC 1 year  Discussed above and answered questions.

## 2023-05-08 ENCOUNTER — OFFICE VISIT (OUTPATIENT)
Dept: PSYCHIATRY | Facility: CLINIC | Age: 78
End: 2023-05-08
Payer: MEDICARE

## 2023-05-08 ENCOUNTER — TELEPHONE (OUTPATIENT)
Dept: PSYCHIATRY | Facility: CLINIC | Age: 78
End: 2023-05-08
Payer: MEDICARE

## 2023-05-08 DIAGNOSIS — F32.A CHRONIC DEPRESSION: ICD-10-CM

## 2023-05-08 DIAGNOSIS — F43.21 COMPLICATED GRIEF: Primary | ICD-10-CM

## 2023-05-08 PROCEDURE — 1159F MED LIST DOCD IN RCRD: CPT | Mod: CPTII,S$GLB,, | Performed by: SOCIAL WORKER

## 2023-05-08 PROCEDURE — 90791 PR PSYCHIATRIC DIAGNOSTIC EVALUATION: ICD-10-PCS | Mod: S$GLB,,, | Performed by: SOCIAL WORKER

## 2023-05-08 PROCEDURE — 99999 PR PBB SHADOW E&M-EST. PATIENT-LVL II: CPT | Mod: PBBFAC,,, | Performed by: SOCIAL WORKER

## 2023-05-08 PROCEDURE — 99499 RISK ADDL DX/OHS AUDIT: ICD-10-PCS | Mod: HCNC,S$GLB,, | Performed by: SOCIAL WORKER

## 2023-05-08 PROCEDURE — 99999 PR PBB SHADOW E&M-EST. PATIENT-LVL II: ICD-10-PCS | Mod: PBBFAC,,, | Performed by: SOCIAL WORKER

## 2023-05-08 PROCEDURE — 90791 PSYCH DIAGNOSTIC EVALUATION: CPT | Mod: S$GLB,,, | Performed by: SOCIAL WORKER

## 2023-05-08 PROCEDURE — 1159F PR MEDICATION LIST DOCUMENTED IN MEDICAL RECORD: ICD-10-PCS | Mod: CPTII,S$GLB,, | Performed by: SOCIAL WORKER

## 2023-05-08 PROCEDURE — 99499 UNLISTED E&M SERVICE: CPT | Mod: HCNC,S$GLB,, | Performed by: SOCIAL WORKER

## 2023-05-08 NOTE — TELEPHONE ENCOUNTER
Schedule pt follow up visits with Edilia Vasquez on 07/24/23 and 08/14/23. Info sent to Alvino to schedule.

## 2023-05-08 NOTE — PROGRESS NOTES
"  Outpatient Psychiatry Initial Visit (PhD/LCSW)    Diagnostic Interview - CPT 95863    Type of visit: In Person Visit at [facility]                       Date: 2023    Primary care provider: Loli Erwin MD    Connie Fields, a 77 y.o. female, for initial evaluation visit. Met with patient.      Subjective:     Chief complaint/reason for encounter: depression    History of present illness: Pt was seen by me x 2 in  and x 1 by Iain Quiñonez LCSW in  following the suicide of her son Enio, who was an alcoholic and had been released from [facility] to live with pt and her . He started drinking again, which reportedly caused him to be "mean." He would drink vodka all day. Pt and  told him he had to leave. He jumped out of bed, said he was going to kill himself and pt's  reportedly told her son, "well do what you're going to do." Son then ran to pt's closet, grabbed a shoebox that held a bag of powder that he claimed was from [employer] to help with his Bipolar Disorder. Son then injested the powder (later determined to be dextromethorphan) in front of pt and  and lost consciousness. Pt called 911 and son was taken to the ED. He was in a coma and on life support in ICU for 5 days. He had a seizure and was deemed brain-dead. Pt chose to take him off life support. "Every time I'd break down to cry, my  didn't want me to talk, so I never really talked about my feelings." Daughter-in-law  of cancer a few weeks ago, and pt "completely broke down" at her [facility] service. A few weeks later, pt was dx with an ulcer following stomach pain and an EGD. Pt's PCP prescribed Wellbutrin 150 mg, which has since been increased to 300 mg. Pt states she had nightmares after son's death but no longer has them. She endorses intrusive thoughts and memories, she cries when exposed to cues reminding her of her son (images of people on ventilators, etc). She states " "her  means well and wants to "fix it" but has not allowed her to express her grief. Pt has held onto her son's belongings.  encourages her to re-home her son's rabbit, but she does not want to do so. For a time, she kept his cell phone charged until her  told her to get rid of it. She is more anxious, has decreased appetite. She has difficulty falling asleep but is able to sleep through the night with trazodone and melatonin. Pt reports she attempted suicide about 15 years ago. She denies current SI.    Symptoms:   Depression: depressed mood, diminished interest, worthlessness/guilt, tearfulness, and social isolation  Anxiety: excessive anxiety/worry, restlessness/keyed up, and post-traumatic stress  Trauma reaction: exposure to trauma (directly, witnessing, hearing about, repeated or extreme exposure to aversive details of traumatic event(s), intrusive memories of event, and negative alterations in cognitions and mood associated with event  Substance abuse: denied  Cognitive functioning: denied  Renuka: none noted  Psychosis: none noted      Psychiatric history: psychotropic management by PCP, prior suicide attempt(s), and has participated in counseling/psychotherapy on an outpatient basis in the past    Medical history:   Patient Active Problem List   Diagnosis    Generalized anxiety disorder    History of diabetic retinopathy Mild nonproliferative diabetic retinopathy of both eyes (None on eye exam 11/26/2018)    Mixed stress and urge urinary incontinence    Controlled type 2 diabetes mellitus with complication, without long-term current use of insulin    Nuclear sclerosis of both eyes    Diabetes mellitus type 2 without retinopathy    Hyperlipidemia associated with type 2 diabetes mellitus    Primary snoring    Nocturnal hypoxemia    Major depressive disorder, recurrent episode, moderate    Personal history of colonic polyps    Nausea        Family history of psychiatric illness:  " Father--suicide    Social history (marriage, employment, legal, etc.): Born in [facility] and is the oldest of 3 having 2 brothers. The family moved to [place] when pt was 5. Father worked for [employer]. Pt's father shot himself when pt was 9 y/o. Brother  of cancer about 4 years ago.     Pt  at age 19 and had 3 sons from that union, which ended after 10 years. She gave up custody of her children at age 33 and three oldest sons lived with their father (first , who told the children that pt didn't want them). Pt's ex-hsb (second marriage and 4th/youngest son's father) was alcoholic, had anger problems and was verbally abusive to pt's son, who also began to abuse alcohol around age 14 or 15; pt  son's father when son was 16 and father stopped seeing or communicating with him. Son began having grand mal seizures at age 18 months, reportedly triggered by fevers. Pt's third son went to rehab five months later but pt was not told this. Pt reports that her three older sons think she didn't want them, particularly after she remarried and youngest lived with pt and third hsb. 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 She has been  to her third hsb, Mark for 25 years. He does not have children. Pt identifies as a Rastafari.    Trauma/abuse history: Father  by suicide; son  by suicide in front of pt 3 years ago    Substance use:  Alcohol: infrequent  Drugs: none  Tobacco: none  Caffeine: none    Current medications and drug reactions (include OTC, herbal):   Outpatient Encounter Medications as of 2023   Medication Sig Dispense Refill    aspirin (ECOTRIN) 81 MG EC tablet Take 81 mg by mouth once daily.      blood sugar diagnostic (BLOOD GLUCOSE TEST) Strp 1 each by Misc.(Non-Drug; Combo Route) route once daily. One touch verio  each 4    blood-glucose meter kit Use as instructed 1 each 0    buPROPion (WELLBUTRIN XL) 300 MG 24 hr tablet Take 1 tablet (300 mg  total) by mouth once daily. 30 tablet 11    cholecalciferol, vitamin D3, (VITAMIN D3) 25 mcg (1,000 unit) capsule Take 1,000 Units by mouth once daily.      clonazePAM (KLONOPIN) 0.5 MG tablet 1/2 to 1 up to BID prn severe anxiety 30 tablet 0    cyanocobalamin (VITAMIN B-12) 1000 MCG tablet Take 100 mcg by mouth once daily. Taking a B-complex      fish oil-omega-3 fatty acids 300-1,000 mg capsule Take 2 g by mouth once daily.      fluticasone propionate (FLONASE) 50 mcg/actuation nasal spray 1 spray in each nostril 15.8 mL 2    lancets (ONE TOUCH DELICA LANCETS) 33 gauge Misc 1 lancet by Misc.(Non-Drug; Combo Route) route 2 (two) times daily. 200 each 3    levocetirizine (XYZAL) 5 MG tablet Take 1 tablet (5 mg total) by mouth every evening. 30 tablet 11    lysine 500 mg Tab Take 500 mg by mouth once daily.      metFORMIN (GLUCOPHAGE-XR) 500 MG ER 24hr tablet TAKE 2 TABLETS TWICE DAILY 360 tablet 1    montelukast (SINGULAIR) 10 mg tablet 1 tablet in the evening      multivitamin capsule Take 1 capsule by mouth once daily.      ondansetron (ZOFRAN) 4 MG tablet Take 1 tablet (4 mg total) by mouth every 6 (six) hours as needed for Nausea. 20 tablet 0    oxybutynin (DITROPAN) 5 MG Tab Take 1 tablet (5 mg total) by mouth 2 (two) times daily. 180 tablet 3    pantoprazole (PROTONIX) 40 MG tablet Take 1 tablet (40 mg total) by mouth once daily. 90 tablet 1    paroxetine (PAXIL) 30 MG tablet TAKE 1 TABLET EVERY MORNING 90 tablet 1    PHENAZOPYRIDINE HCL (AZO ORAL) Take 1 tablet by mouth daily as needed.       pravastatin (PRAVACHOL) 40 MG tablet TAKE 1 TABLET EVERY DAY 90 tablet 0    traZODone (DESYREL) 50 MG tablet Take 1 tablet (50 mg total) by mouth every evening. 90 tablet 2     No facility-administered encounter medications on file as of 5/8/2023.          Objective - Current Evaluation:     Mental Status Evaluation  Appearance: unremarkable, age appropriate, neatly groomed  Behavior: normal, tearful, friendly and  cooperative  Speech: normal tone, normal rate, normal pitch, normal volume  Mood: anxious, depressed, sad  Affect: congruent and appropriate, blunted  Thought Process: normal and logical  Thought Content: normal, no suicidality, no homicidality, delusions, or paranoia  Sensorium: grossly intact  Cognition: grossly intact  Insight: good  Judgment: adequate to circumstances    Strengths and liabilities: Strength: Patient accepts guidance/feedback, Strength: Patient is expressive/articulate, Strength: Patient is intelligent, Strength: Patient is motivated for change, Strength: Patient is physically healthy, Strength: Patient has reasonable judgment, Liability: Patient has limited or no suport network, and Liability: Patient lacks coping skills      Diagnostic Impression - Plan:   Discussed risks, benefits, and alternatives to treatment plan documented above with patient. I answered all patient questions related to this plan and patient expressed understanding and agreement.      1. Complicated grief    2. Chronic depression        Plan:individual psychotherapy and medication management by physician    Return to Clinic: as scheduled    Length of Service (minutes): 60         Edilia Vasquez LCSW  Outpatient Psychiatry

## 2023-07-05 ENCOUNTER — TELEPHONE (OUTPATIENT)
Dept: PSYCHIATRY | Facility: CLINIC | Age: 78
End: 2023-07-05
Payer: MEDICARE

## 2023-07-05 DIAGNOSIS — K21.9 GASTROESOPHAGEAL REFLUX DISEASE, UNSPECIFIED WHETHER ESOPHAGITIS PRESENT: ICD-10-CM

## 2023-07-05 RX ORDER — PANTOPRAZOLE SODIUM 40 MG/1
40 TABLET, DELAYED RELEASE ORAL DAILY
Qty: 90 TABLET | Refills: 1 | Status: SHIPPED | OUTPATIENT
Start: 2023-07-05 | End: 2023-12-27

## 2023-07-05 NOTE — TELEPHONE ENCOUNTER
Care Due:                  Date            Visit Type   Department     Provider  --------------------------------------------------------------------------------                                EP -                              PRIMARY      [facility] INTERNAL  Last Visit: 03-      [facility] ([facility])   TENZIN Erwin                              EP -                              PRIMARY      [facility] INTERNAL  Next Visit: 09-      [facility] ([facility])   TENZIN Erwin                                                            Last  Test          Frequency    Reason                     Performed    Due Date  --------------------------------------------------------------------------------    Cr..........  12 months..  metFORMIN................  08- 08-    HBA1C.......  6 months...  metFORMIN................  03- 08-    [facility] Embedded Care Due Messages. Reference number: [id number].   7/05/2023 10:09:49 AM CDT

## 2023-07-05 NOTE — TELEPHONE ENCOUNTER
----- Message from Dorita Toro sent at 7/5/2023 11:04 AM CDT -----  Contact: Connie Dixon called in to cancel her 7/24 and 8/14 appts. Please call her back at [phone].    Thanks  TS

## 2023-07-06 ENCOUNTER — IMMUNIZATION (OUTPATIENT)
Dept: PRIMARY CARE CLINIC | Facility: CLINIC | Age: 78
End: 2023-07-06
Payer: MEDICARE

## 2023-07-06 DIAGNOSIS — Z23 NEED FOR VACCINATION: Primary | ICD-10-CM

## 2023-07-06 PROCEDURE — 91312 COVID-19, MRNA, LNP-S, BIVALENT BOOSTER, PF, 30 MCG/0.3 ML DOSE: ICD-10-PCS | Mod: S$GLB,,, | Performed by: FAMILY MEDICINE

## 2023-07-06 PROCEDURE — 0124A COVID-19, MRNA, LNP-S, BIVALENT BOOSTER, PF, 30 MCG/0.3 ML DOSE: ICD-10-PCS | Mod: S$GLB,,, | Performed by: FAMILY MEDICINE

## 2023-07-06 PROCEDURE — 91312 COVID-19, MRNA, LNP-S, BIVALENT BOOSTER, PF, 30 MCG/0.3 ML DOSE: CPT | Mod: S$GLB,,, | Performed by: FAMILY MEDICINE

## 2023-07-06 PROCEDURE — 0124A COVID-19, MRNA, LNP-S, BIVALENT BOOSTER, PF, 30 MCG/0.3 ML DOSE: CPT | Mod: S$GLB,,, | Performed by: FAMILY MEDICINE

## 2023-09-27 DIAGNOSIS — N39.46 MIXED STRESS AND URGE URINARY INCONTINENCE: ICD-10-CM

## 2023-09-27 RX ORDER — OXYBUTYNIN CHLORIDE 5 MG/1
5 TABLET ORAL 2 TIMES DAILY
Qty: 180 TABLET | Refills: 1 | Status: SHIPPED | OUTPATIENT
Start: 2023-09-27

## 2023-09-27 NOTE — TELEPHONE ENCOUNTER
Provider Staff:  Action required for this patient     Please see care gap opportunities below in Care Due Message.    Thanks!  [facility]     Appointments      Date Provider   Last Visit   3/8/2023 Loli Erwin MD   Next Visit   10/18/2023 Loli Erwin MD     Refill Decision Note   Connie Fields  is requesting a refill authorization.  Brief Assessment and Rationale for Refill:  Approve     Medication Therapy Plan:         Comments:     Note composed:11:49 AM 09/27/2023

## 2023-09-27 NOTE — TELEPHONE ENCOUNTER
Care Due:                  Date            Visit Type   Department     Provider  --------------------------------------------------------------------------------                                EP -                              PRIMARY      [facility] INTERNAL  Last Visit: 03-      [facility] ([facility])   TENZIN Erwin                              EP -                              PRIMARY      [facility] INTERNAL  Next Visit: 10-      [facility] ([facility])   TENZIN Erwin                                                            Last  Test          Frequency    Reason                     Performed    Due Date  --------------------------------------------------------------------------------    Cr..........  12 months..  metFORMIN................  08- 08-    HBA1C.......  6 months...  metFORMIN................  03- 08-    [facility] Embedded Care Due Messages. Reference number: [id number].   9/27/2023 11:00:12 AM CDT

## 2023-10-17 RX ORDER — ESOMEPRAZOLE MAGNESIUM 40 MG/1
1 CAPSULE, DELAYED RELEASE ORAL EVERY MORNING
COMMUNITY

## 2023-10-18 ENCOUNTER — OFFICE VISIT (OUTPATIENT)
Dept: INTERNAL MEDICINE | Facility: CLINIC | Age: 78
End: 2023-10-18
Payer: MEDICARE

## 2023-10-18 VITALS
HEART RATE: 79 BPM | SYSTOLIC BLOOD PRESSURE: 128 MMHG | WEIGHT: 133.19 LBS | TEMPERATURE: 99 F | DIASTOLIC BLOOD PRESSURE: 64 MMHG | BODY MASS INDEX: 22.19 KG/M2 | OXYGEN SATURATION: 97 % | HEIGHT: 65 IN

## 2023-10-18 DIAGNOSIS — E11.8 CONTROLLED TYPE 2 DIABETES MELLITUS WITH COMPLICATION, WITHOUT LONG-TERM CURRENT USE OF INSULIN: Primary | ICD-10-CM

## 2023-10-18 DIAGNOSIS — E11.69 HYPERLIPIDEMIA ASSOCIATED WITH TYPE 2 DIABETES MELLITUS: ICD-10-CM

## 2023-10-18 DIAGNOSIS — E78.5 HYPERLIPIDEMIA ASSOCIATED WITH TYPE 2 DIABETES MELLITUS: ICD-10-CM

## 2023-10-18 DIAGNOSIS — Z78.0 POSTMENOPAUSAL: ICD-10-CM

## 2023-10-18 DIAGNOSIS — I83.811 VARICOSE VEINS OF LEG WITH PAIN, RIGHT: ICD-10-CM

## 2023-10-18 PROCEDURE — 1126F AMNT PAIN NOTED NONE PRSNT: CPT | Mod: HCNC,CPTII,S$GLB, | Performed by: FAMILY MEDICINE

## 2023-10-18 PROCEDURE — 99214 OFFICE O/P EST MOD 30 MIN: CPT | Mod: HCNC,S$GLB,, | Performed by: FAMILY MEDICINE

## 2023-10-18 PROCEDURE — 99999 PR PBB SHADOW E&M-EST. PATIENT-LVL IV: CPT | Mod: PBBFAC,HCNC,, | Performed by: FAMILY MEDICINE

## 2023-10-18 PROCEDURE — 1101F PT FALLS ASSESS-DOCD LE1/YR: CPT | Mod: HCNC,CPTII,S$GLB, | Performed by: FAMILY MEDICINE

## 2023-10-18 PROCEDURE — 1126F PR PAIN SEVERITY QUANTIFIED, NO PAIN PRESENT: ICD-10-PCS | Mod: HCNC,CPTII,S$GLB, | Performed by: FAMILY MEDICINE

## 2023-10-18 PROCEDURE — 99999 PR PBB SHADOW E&M-EST. PATIENT-LVL IV: ICD-10-PCS | Mod: PBBFAC,HCNC,, | Performed by: FAMILY MEDICINE

## 2023-10-18 PROCEDURE — 99214 PR OFFICE/OUTPT VISIT, EST, LEVL IV, 30-39 MIN: ICD-10-PCS | Mod: HCNC,S$GLB,, | Performed by: FAMILY MEDICINE

## 2023-10-18 PROCEDURE — 1159F MED LIST DOCD IN RCRD: CPT | Mod: HCNC,CPTII,S$GLB, | Performed by: FAMILY MEDICINE

## 2023-10-18 PROCEDURE — 1159F PR MEDICATION LIST DOCUMENTED IN MEDICAL RECORD: ICD-10-PCS | Mod: HCNC,CPTII,S$GLB, | Performed by: FAMILY MEDICINE

## 2023-10-18 PROCEDURE — 1101F PR PT FALLS ASSESS DOC 0-1 FALLS W/OUT INJ PAST YR: ICD-10-PCS | Mod: HCNC,CPTII,S$GLB, | Performed by: FAMILY MEDICINE

## 2023-10-18 PROCEDURE — 3288F PR FALLS RISK ASSESSMENT DOCUMENTED: ICD-10-PCS | Mod: HCNC,CPTII,S$GLB, | Performed by: FAMILY MEDICINE

## 2023-10-18 PROCEDURE — 3288F FALL RISK ASSESSMENT DOCD: CPT | Mod: HCNC,CPTII,S$GLB, | Performed by: FAMILY MEDICINE

## 2023-10-18 PROCEDURE — 3078F PR MOST RECENT DIASTOLIC BLOOD PRESSURE < 80 MM HG: ICD-10-PCS | Mod: HCNC,CPTII,S$GLB, | Performed by: FAMILY MEDICINE

## 2023-10-18 PROCEDURE — 3074F SYST BP LT 130 MM HG: CPT | Mod: HCNC,CPTII,S$GLB, | Performed by: FAMILY MEDICINE

## 2023-10-18 PROCEDURE — 3074F PR MOST RECENT SYSTOLIC BLOOD PRESSURE < 130 MM HG: ICD-10-PCS | Mod: HCNC,CPTII,S$GLB, | Performed by: FAMILY MEDICINE

## 2023-10-18 PROCEDURE — 3078F DIAST BP <80 MM HG: CPT | Mod: HCNC,CPTII,S$GLB, | Performed by: FAMILY MEDICINE

## 2023-10-18 RX ORDER — METFORMIN HYDROCHLORIDE 500 MG/1
1000 TABLET, EXTENDED RELEASE ORAL 2 TIMES DAILY
Qty: 360 TABLET | Refills: 1 | Status: SHIPPED | OUTPATIENT
Start: 2023-10-18 | End: 2024-01-03 | Stop reason: SDUPTHER

## 2023-10-18 NOTE — PROGRESS NOTES
Connie Fields  10/18/2023  [id number]    Loli Erwin MD  Patient Care Team:  Loli Erwin MD as PCP - General (Family Medicine)  Juventino Higuera MD as Consulting Physician (Ophthalmology)  Edilia Vasquez LCSW as  (Psychiatry)  Tito Anderson, PENNY as Consulting Physician (Optometry)          Visit Type:a scheduled routine follow-up visit    Chief Complaint:  Chief Complaint   Patient presents with    Follow-up       History of Present Illness:  Follow up  DM  Lab Results   Component Value Date    HGBA1C 5.5 03/01/2023   On Metformin    Seeing vascular for the legs  Venous insuff.  She has cyancoacrylate ablation with vascular.  She is having knee arthritis.    She is getting injection in the knee  Considering knee replacement.    Needs handicap tag      History:  Past Medical History:   Diagnosis Date    Anxiety     Anxiety     Cataract (lens) fragments in eye following cataract surgery, bilateral     DM (diabetes mellitus) 2013     10/21/2018    DM (diabetes mellitus) 2013     am 12/09/2019    DM2 (diabetes mellitus, type 2) 01/13    GERD (gastroesophageal reflux disease)     Hiatal hernia     Mild nonproliferative diabetic retinopathy of both eyes 1/22/15    OS>OD    Mild nonproliferative diabetic retinopathy of both eyes 1/22/2015    OS>OD     Osteoarthritis      Past Surgical History:   Procedure Laterality Date    AUGMENTATION OF BREAST      saline    BREAST SURGERY Bilateral 1992    saline implants    BUNIONECTOMY      CATARACT EXTRACTION W/  INTRAOCULAR LENS IMPLANT Bilateral     12/12/18 OD, 1/9/19 OS, Dr Higuera    CHOLECYSTECTOMY  05/12    COLONOSCOPY  08    1 POLYP    COLONOSCOPY N/A 12/6/2016    Procedure: COLONOSCOPY with biopsies;  Surgeon: Sarah Schmidt MD;  Location: [facility];  Service: Endoscopy;  Laterality: N/A;    COLONOSCOPY N/A 10/19/2022    Procedure: COLONOSCOPY;  Surgeon: Lindsey Gregory MD;  Location: [facility];  Service:  Endoscopy;  Laterality: N/A;    ESOPHAGOGASTRODUODENOSCOPY N/A 1/31/2023    Procedure: EGD (ESOPHAGOGASTRODUODENOSCOPY);  Surgeon: Harvinder Head MD;  Location: [facility];  Service: Endoscopy;  Laterality: N/A;    PCIOL  Right 12/12/2018    DR. JUAREZ    TONSILLECTOMY, ADENOIDECTOMY       Family History   Problem Relation Age of Onset    Heart failure Father     Suicide Father     Diabetes Mother     Heart failure Mother     Hypertension Mother     Stroke Mother     Heart failure Brother     Cancer Brother         full body    Hypertension Brother     Coronary artery disease Brother     Diabetes Maternal Grandmother     Hypertension Son     Suicide Son      Social History     Socioeconomic History    Marital status:    Tobacco Use    Smoking status: Never     Passive exposure: Never    Smokeless tobacco: Never   Substance and Sexual Activity    Alcohol use: Yes     Comment: occasionally    Drug use: No    Sexual activity: Yes     Partners: Male     Social Determinants of Health     Financial Resource Strain: Low Risk  (3/19/2021)    Overall Financial Resource Strain (CARDIA)     Difficulty of Paying Living Expenses: Not hard at all   Food Insecurity: No Food Insecurity (3/19/2021)    Hunger Vital Sign     Worried About Running Out of Food in the Last Year: Never true     Ran Out of Food in the Last Year: Never true   Transportation Needs: No Transportation Needs (3/19/2021)    PRAPARE - Transportation     Lack of Transportation (Medical): No     Lack of Transportation (Non-Medical): No   Physical Activity: Sufficiently Active (3/19/2021)    Exercise Vital Sign     Days of Exercise per Week: 5 days     Minutes of Exercise per Session: 60 min   Stress: No Stress Concern Present (3/19/2021)    [employer] - Occupational Stress Questionnaire     Feeling of Stress : Not at all   Social Connections: Socially Integrated (3/19/2021)    Social Connection and Isolation Panel  [NHANES]     Frequency of Communication with Friends and Family: More than three times a week     Frequency of Social Gatherings with Friends and Family: Once a week     Attends Adventism Services: More than 4 times per year     Active Member of Clubs or Organizations: Yes     Attends Club or Organization Meetings: More than 4 times per year     Marital Status:    Housing Stability: Low Risk  (3/19/2021)    Housing Stability Vital Sign     Unable to Pay for Housing in the Last Year: No     Number of Places Lived in the Last Year: 1     Unstable Housing in the Last Year: No     Patient Active Problem List   Diagnosis    Generalized anxiety disorder    History of diabetic retinopathy Mild nonproliferative diabetic retinopathy of both eyes (None on eye exam 11/26/2018)    Mixed stress and urge urinary incontinence    Controlled type 2 diabetes mellitus with complication, without long-term current use of insulin    Nuclear sclerosis of both eyes    Diabetes mellitus type 2 without retinopathy    Hyperlipidemia associated with type 2 diabetes mellitus    Primary snoring    Nocturnal hypoxemia    Major depressive disorder, recurrent episode, moderate    Personal history of colonic polyps    Nausea     Review of patient's allergies indicates:   Allergen Reactions    Hydrocodone Nausea And Vomiting     Once only - on empty stomach       The following were reviewed at this visit: active problem list, medication list, allergies, family history, social history, and health maintenance.    Medications:  Current Outpatient Medications on File Prior to Visit   Medication Sig Dispense Refill    aspirin (ECOTRIN) 81 MG EC tablet Take 81 mg by mouth once daily.      blood sugar diagnostic (BLOOD GLUCOSE TEST) Strp 1 each by Misc.(Non-Drug; Combo Route) route once daily. One touch verio  each 4    blood-glucose meter kit Use as instructed 1 each 0    buPROPion (WELLBUTRIN XL) 300 MG 24 hr tablet Take 1 tablet (300 mg  total) by mouth once daily. 30 tablet 11    cholecalciferol, vitamin D3, (VITAMIN D3) 25 mcg (1,000 unit) capsule Take 1,000 Units by mouth once daily.      clonazePAM (KLONOPIN) 0.5 MG tablet 1/2 to 1 up to BID prn severe anxiety 30 tablet 0    cyanocobalamin (VITAMIN B-12) 1000 MCG tablet Take 100 mcg by mouth once daily. Taking a B-complex      esomeprazole (NEXIUM) 40 MG capsule Take 1 capsule by mouth every morning.      fish oil-omega-3 fatty acids 300-1,000 mg capsule Take 2 g by mouth once daily.      fluticasone propionate (FLONASE) 50 mcg/actuation nasal spray 1 spray in each nostril 15.8 mL 2    lancets (ONE TOUCH DELICA LANCETS) 33 gauge Misc 1 lancet by Misc.(Non-Drug; Combo Route) route 2 (two) times daily. 200 each 3    levocetirizine (XYZAL) 5 MG tablet Take 1 tablet (5 mg total) by mouth every evening. 30 tablet 11    lysine 500 mg Tab Take 500 mg by mouth once daily.      metFORMIN (GLUCOPHAGE-XR) 500 MG ER 24hr tablet TAKE 2 TABLETS TWICE DAILY 360 tablet 1    montelukast (SINGULAIR) 10 mg tablet 1 tablet in the evening      multivitamin capsule Take 1 capsule by mouth once daily.      ondansetron (ZOFRAN) 4 MG tablet Take 1 tablet (4 mg total) by mouth every 6 (six) hours as needed for Nausea. 20 tablet 0    oxybutynin (DITROPAN) 5 MG Tab TAKE 1 TABLET TWICE DAILY 180 tablet 1    pantoprazole (PROTONIX) 40 MG tablet Take 1 tablet (40 mg total) by mouth once daily. 90 tablet 1    paroxetine (PAXIL) 30 MG tablet TAKE 1 TABLET EVERY MORNING 90 tablet 1    PHENAZOPYRIDINE HCL (AZO ORAL) Take 1 tablet by mouth daily as needed.       pravastatin (PRAVACHOL) 40 MG tablet TAKE 1 TABLET EVERY DAY 90 tablet 0    traZODone (DESYREL) 50 MG tablet Take 1 tablet (50 mg total) by mouth every evening. 90 tablet 2     No current facility-administered medications on file prior to visit.       Medications have been reviewed and reconciled with patient at this visit.  Barriers to medications reviewed with  patient.    Adverse reactions to current medications reviewed with patient..    Over the counter medications reviewed and reconciled with patient.    Exam:  Wt Readings from Last 3 Encounters:   10/18/23 60.4 kg (133 lb 2.5 oz)   03/08/23 64.2 kg (141 lb 8.6 oz)   02/08/23 65 kg (143 lb 4.8 oz)     Temp Readings from Last 3 Encounters:   10/18/23 98.6 °F (37 °C) (Tympanic)   03/08/23 97.9 °F (36.6 °C)   01/31/23 98.6 °F (37 °C) (Temporal)     BP Readings from Last 3 Encounters:   10/18/23 128/64   03/08/23 120/76   02/08/23 126/70     Pulse Readings from Last 3 Encounters:   10/18/23 79   03/08/23 86   02/08/23 78     Body mass index is 22.16 kg/m².      Review of Systems   Constitutional: Negative.  Negative for chills and fever.   HENT: Negative.  Negative for congestion, sinus pain and sore throat.    Eyes:  Negative for blurred vision and double vision.   Respiratory:  Negative for cough, sputum production, shortness of breath and wheezing.    Cardiovascular:  Negative for chest pain, palpitations and leg swelling.   Gastrointestinal:  Negative for abdominal pain, constipation, diarrhea, heartburn, nausea and vomiting.   Genitourinary: Negative.    Musculoskeletal: Negative.    Skin: Negative.  Negative for rash.   Neurological: Negative.    Endo/Heme/Allergies: Negative.  Negative for polydipsia. Does not bruise/bleed easily.   Psychiatric/Behavioral:  Negative for depression and substance abuse.      Physical Exam  Nursing note reviewed.   Cardiovascular:      Rate and Rhythm: Normal rate and regular rhythm.   Pulmonary:      Effort: Pulmonary effort is normal. No respiratory distress.   Neurological:      Mental Status: She is alert and oriented to person, place, and time.   Psychiatric:         Mood and Affect: Mood normal.         Behavior: Behavior normal.         Thought Content: Thought content normal.         Judgment: Judgment normal.         Laboratory Reviewed ({Yes)  Lab Results   Component Value  Date    WBC 5.60 08/19/2022    HGB 13.4 08/19/2022    HCT 39.9 08/19/2022     08/19/2022    CHOL 107 (L) 08/19/2022    TRIG 48 08/19/2022    HDL 54 08/19/2022    ALT 14 08/19/2022    AST 17 08/19/2022     08/19/2022    K 4.2 08/19/2022     08/19/2022    CREATININE 0.6 08/19/2022    BUN 12 08/19/2022    CO2 29 08/19/2022    HGBA1C 5.5 03/01/2023       Connie was seen today for follow-up.    Diagnoses and all orders for this visit:    Controlled type 2 diabetes mellitus with complication, without long-term current use of insulin  -     Comprehensive Metabolic Panel; Future  -     Lipid Panel; Future  -     Hemoglobin A1C; Future  -     Microalbumin/Creatinine Ratio, Urine; Future    Hyperlipidemia associated with type 2 diabetes mellitus    Varicose veins of leg with pain, right    Postmenopausal  -     DXA Bone Density Axial Skeleton 1 or more sites; Future                Care Plan/Goals: Reviewed    Goals    None         Follow up: No follow-ups on file.    After visit summary was printed and given to patient upon discharge today.  Patient goals and care plan are included in After Visit Summary.

## 2023-10-25 ENCOUNTER — LAB VISIT (OUTPATIENT)
Dept: LAB | Facility: HOSPITAL | Age: 78
End: 2023-10-25
Attending: FAMILY MEDICINE
Payer: MEDICARE

## 2023-10-25 DIAGNOSIS — E11.8 CONTROLLED TYPE 2 DIABETES MELLITUS WITH COMPLICATION, WITHOUT LONG-TERM CURRENT USE OF INSULIN: ICD-10-CM

## 2023-10-25 LAB
ALBUMIN SERPL BCP-MCNC: 4 G/DL (ref 3.5–5.2)
ALP SERPL-CCNC: 58 U/L (ref 55–135)
ALT SERPL W/O P-5'-P-CCNC: 21 U/L (ref 10–44)
ANION GAP SERPL CALC-SCNC: 10 MMOL/L (ref 8–16)
AST SERPL-CCNC: 22 U/L (ref 10–40)
BILIRUB SERPL-MCNC: 0.9 MG/DL (ref 0.1–1)
BUN SERPL-MCNC: 12 MG/DL (ref 8–23)
CALCIUM SERPL-MCNC: 9.4 MG/DL (ref 8.7–10.5)
CHLORIDE SERPL-SCNC: 106 MMOL/L (ref 95–110)
CHOLEST SERPL-MCNC: 120 MG/DL (ref 120–199)
CHOLEST/HDLC SERPL: 2.4 {RATIO} (ref 2–5)
CO2 SERPL-SCNC: 27 MMOL/L (ref 23–29)
CREAT SERPL-MCNC: 0.7 MG/DL (ref 0.5–1.4)
EST. GFR  (NO RACE VARIABLE): >60 ML/MIN/1.73 M^2
ESTIMATED AVG GLUCOSE: 108 MG/DL (ref 68–131)
GLUCOSE SERPL-MCNC: 105 MG/DL (ref 70–110)
HBA1C MFR BLD: 5.4 % (ref 4–5.6)
HDLC SERPL-MCNC: 51 MG/DL (ref 40–75)
HDLC SERPL: 42.5 % (ref 20–50)
LDLC SERPL CALC-MCNC: 54.6 MG/DL (ref 63–159)
NONHDLC SERPL-MCNC: 69 MG/DL
POTASSIUM SERPL-SCNC: 4.3 MMOL/L (ref 3.5–5.1)
PROT SERPL-MCNC: 6.4 G/DL (ref 6–8.4)
SODIUM SERPL-SCNC: 143 MMOL/L (ref 136–145)
TRIGL SERPL-MCNC: 72 MG/DL (ref 30–150)

## 2023-10-25 PROCEDURE — 80053 COMPREHEN METABOLIC PANEL: CPT | Mod: HCNC | Performed by: FAMILY MEDICINE

## 2023-10-25 PROCEDURE — 80061 LIPID PANEL: CPT | Mod: HCNC | Performed by: FAMILY MEDICINE

## 2023-10-25 PROCEDURE — 83036 HEMOGLOBIN GLYCOSYLATED A1C: CPT | Mod: HCNC | Performed by: FAMILY MEDICINE

## 2023-10-25 PROCEDURE — 36415 COLL VENOUS BLD VENIPUNCTURE: CPT | Mod: HCNC | Performed by: FAMILY MEDICINE

## 2023-10-26 ENCOUNTER — PATIENT MESSAGE (OUTPATIENT)
Dept: INTERNAL MEDICINE | Facility: CLINIC | Age: 78
End: 2023-10-26
Payer: MEDICARE

## 2023-12-04 DIAGNOSIS — F43.0 STRESS REACTION: ICD-10-CM

## 2023-12-04 RX ORDER — BUPROPION HYDROCHLORIDE 300 MG/1
TABLET ORAL
Qty: 90 TABLET | Refills: 0 | OUTPATIENT
Start: 2023-12-04

## 2023-12-04 NOTE — TELEPHONE ENCOUNTER
No care due was identified.  [facility] Embedded Care Due Messages. Reference number: [id number].   12/04/2023 6:50:27 AM CST

## 2023-12-04 NOTE — TELEPHONE ENCOUNTER
Refill Decision Note   Connie Fields  is requesting a refill authorization.  Brief Assessment and Rationale for Refill:  Quick Discontinue     Medication Therapy Plan:   Pharmacy is requesting new scripts for the following medications without required information, (sig/ frequency/qty/etc)         Comments: Pharmacies have been requesting medications for patients without required information, (sig, frequency, qty, etc.). In addition, requests are sent for medication(s) pt. are currently not taking, and medications patients have never taken.    We have spoken to the pharmacies about these request types and advised their teams previously that we are unable to assess these New Script requests and require all details for these requests. This is a known issue and has been reported.      Note composed:6:59 AM 12/04/2023

## 2023-12-20 ENCOUNTER — APPOINTMENT (OUTPATIENT)
Dept: RADIOLOGY | Facility: HOSPITAL | Age: 78
End: 2023-12-20
Attending: FAMILY MEDICINE
Payer: MEDICARE

## 2023-12-20 DIAGNOSIS — Z78.0 POSTMENOPAUSAL: ICD-10-CM

## 2023-12-20 PROCEDURE — 77080 DXA BONE DENSITY AXIAL SKELETON 1 OR MORE SITES: ICD-10-PCS | Mod: 26,HCNC,, | Performed by: RADIOLOGY

## 2023-12-20 PROCEDURE — 77080 DXA BONE DENSITY AXIAL: CPT | Mod: 26,HCNC,, | Performed by: RADIOLOGY

## 2023-12-20 PROCEDURE — 77080 DXA BONE DENSITY AXIAL: CPT | Mod: TC,HCNC

## 2023-12-26 DIAGNOSIS — K21.9 GASTROESOPHAGEAL REFLUX DISEASE, UNSPECIFIED WHETHER ESOPHAGITIS PRESENT: ICD-10-CM

## 2023-12-27 RX ORDER — PANTOPRAZOLE SODIUM 40 MG/1
40 TABLET, DELAYED RELEASE ORAL
Qty: 90 TABLET | Refills: 3 | Status: SHIPPED | OUTPATIENT
Start: 2023-12-27

## 2024-01-03 DIAGNOSIS — G47.00 INSOMNIA, UNSPECIFIED TYPE: ICD-10-CM

## 2024-01-03 NOTE — TELEPHONE ENCOUNTER
No care due was identified.  Health [facility] Embedded Care Due Messages. Reference number: [id number].   1/03/2024 11:33:05 AM CST

## 2024-01-03 NOTE — TELEPHONE ENCOUNTER
Called and spoke with patient. Patient states she was going through her medicine cabinet and realized she is running out of her trazodone and metformin. Will pend prescriptions. Would like refills sent to [facility].    LOV 10/18/23

## 2024-01-03 NOTE — TELEPHONE ENCOUNTER
----- Message from Dorita Toro sent at 1/3/2024 11:02 AM CST -----  Contact: Connie Dixon requests a call back from the nurse. She is low on some of her medications and wants advice on which ones to refill. Please call her back at [phone].    Thanks  TS

## 2024-01-04 RX ORDER — TRAZODONE HYDROCHLORIDE 50 MG/1
50 TABLET ORAL NIGHTLY
Qty: 90 TABLET | Refills: 3 | Status: SHIPPED | OUTPATIENT
Start: 2024-01-04

## 2024-01-04 RX ORDER — METFORMIN HYDROCHLORIDE 500 MG/1
1000 TABLET, EXTENDED RELEASE ORAL 2 TIMES DAILY
Qty: 360 TABLET | Refills: 1 | Status: SHIPPED | OUTPATIENT
Start: 2024-01-04

## 2024-01-04 NOTE — TELEPHONE ENCOUNTER
Refill Decision Note   Connie Fields  is requesting a refill authorization.  Brief Assessment and Rationale for Refill:  Approve     Medication Therapy Plan:         Comments:     Note composed:10:17 AM 01/04/2024             Appointments     Last Visit   10/18/2023 Loli Erwin MD   Next Visit   4/24/2024 Loli Erwin MD

## 2024-05-09 ENCOUNTER — LAB VISIT (OUTPATIENT)
Dept: LAB | Facility: HOSPITAL | Age: 79
End: 2024-05-09
Payer: MEDICARE

## 2024-05-09 ENCOUNTER — OFFICE VISIT (OUTPATIENT)
Dept: INTERNAL MEDICINE | Facility: CLINIC | Age: 79
End: 2024-05-09
Payer: MEDICARE

## 2024-05-09 VITALS
OXYGEN SATURATION: 94 % | HEIGHT: 65 IN | WEIGHT: 144.63 LBS | SYSTOLIC BLOOD PRESSURE: 120 MMHG | TEMPERATURE: 97 F | DIASTOLIC BLOOD PRESSURE: 66 MMHG | HEART RATE: 104 BPM | BODY MASS INDEX: 24.1 KG/M2

## 2024-05-09 DIAGNOSIS — E78.5 HYPERLIPIDEMIA ASSOCIATED WITH TYPE 2 DIABETES MELLITUS: ICD-10-CM

## 2024-05-09 DIAGNOSIS — F41.1 GENERALIZED ANXIETY DISORDER: ICD-10-CM

## 2024-05-09 DIAGNOSIS — E11.69 HYPERLIPIDEMIA ASSOCIATED WITH TYPE 2 DIABETES MELLITUS: ICD-10-CM

## 2024-05-09 DIAGNOSIS — E11.8 CONTROLLED TYPE 2 DIABETES MELLITUS WITH COMPLICATION, WITHOUT LONG-TERM CURRENT USE OF INSULIN: Primary | ICD-10-CM

## 2024-05-09 DIAGNOSIS — F33.1 MAJOR DEPRESSIVE DISORDER, RECURRENT EPISODE, MODERATE: ICD-10-CM

## 2024-05-09 DIAGNOSIS — E11.8 CONTROLLED TYPE 2 DIABETES MELLITUS WITH COMPLICATION, WITHOUT LONG-TERM CURRENT USE OF INSULIN: ICD-10-CM

## 2024-05-09 DIAGNOSIS — Z00.00 ANNUAL PHYSICAL EXAM: ICD-10-CM

## 2024-05-09 LAB
ALBUMIN SERPL BCP-MCNC: 3.8 G/DL (ref 3.5–5.2)
ALP SERPL-CCNC: 69 U/L (ref 55–135)
ALT SERPL W/O P-5'-P-CCNC: 18 U/L (ref 10–44)
ANION GAP SERPL CALC-SCNC: 8 MMOL/L (ref 8–16)
AST SERPL-CCNC: 23 U/L (ref 10–40)
BILIRUB SERPL-MCNC: 0.7 MG/DL (ref 0.1–1)
BUN SERPL-MCNC: 16 MG/DL (ref 8–23)
CALCIUM SERPL-MCNC: 9.4 MG/DL (ref 8.7–10.5)
CHLORIDE SERPL-SCNC: 104 MMOL/L (ref 95–110)
CO2 SERPL-SCNC: 28 MMOL/L (ref 23–29)
CREAT SERPL-MCNC: 0.7 MG/DL (ref 0.5–1.4)
EST. GFR  (NO RACE VARIABLE): >60 ML/MIN/1.73 M^2
ESTIMATED AVG GLUCOSE: 114 MG/DL (ref 68–131)
GLUCOSE SERPL-MCNC: 105 MG/DL (ref 70–110)
HBA1C MFR BLD: 5.6 % (ref 4–5.6)
POTASSIUM SERPL-SCNC: 4 MMOL/L (ref 3.5–5.1)
PROT SERPL-MCNC: 6.3 G/DL (ref 6–8.4)
SODIUM SERPL-SCNC: 140 MMOL/L (ref 136–145)

## 2024-05-09 PROCEDURE — 83036 HEMOGLOBIN GLYCOSYLATED A1C: CPT | Mod: HCNC | Performed by: FAMILY MEDICINE

## 2024-05-09 PROCEDURE — 36415 COLL VENOUS BLD VENIPUNCTURE: CPT | Mod: HCNC | Performed by: FAMILY MEDICINE

## 2024-05-09 PROCEDURE — 1101F PT FALLS ASSESS-DOCD LE1/YR: CPT | Mod: HCNC,CPTII,S$GLB, | Performed by: FAMILY MEDICINE

## 2024-05-09 PROCEDURE — 1159F MED LIST DOCD IN RCRD: CPT | Mod: HCNC,CPTII,S$GLB, | Performed by: FAMILY MEDICINE

## 2024-05-09 PROCEDURE — 3078F DIAST BP <80 MM HG: CPT | Mod: HCNC,CPTII,S$GLB, | Performed by: FAMILY MEDICINE

## 2024-05-09 PROCEDURE — 1160F RVW MEDS BY RX/DR IN RCRD: CPT | Mod: HCNC,CPTII,S$GLB, | Performed by: FAMILY MEDICINE

## 2024-05-09 PROCEDURE — G2211 COMPLEX E/M VISIT ADD ON: HCPCS | Mod: HCNC,S$GLB,, | Performed by: FAMILY MEDICINE

## 2024-05-09 PROCEDURE — 99999 PR PBB SHADOW E&M-EST. PATIENT-LVL V: CPT | Mod: PBBFAC,HCNC,, | Performed by: FAMILY MEDICINE

## 2024-05-09 PROCEDURE — 3074F SYST BP LT 130 MM HG: CPT | Mod: HCNC,CPTII,S$GLB, | Performed by: FAMILY MEDICINE

## 2024-05-09 PROCEDURE — 3288F FALL RISK ASSESSMENT DOCD: CPT | Mod: HCNC,CPTII,S$GLB, | Performed by: FAMILY MEDICINE

## 2024-05-09 PROCEDURE — 99214 OFFICE O/P EST MOD 30 MIN: CPT | Mod: HCNC,S$GLB,, | Performed by: FAMILY MEDICINE

## 2024-05-09 PROCEDURE — 1126F AMNT PAIN NOTED NONE PRSNT: CPT | Mod: HCNC,CPTII,S$GLB, | Performed by: FAMILY MEDICINE

## 2024-05-09 PROCEDURE — 80053 COMPREHEN METABOLIC PANEL: CPT | Mod: HCNC | Performed by: FAMILY MEDICINE

## 2024-05-09 NOTE — PROGRESS NOTES
Connie Fields  05/09/2024  [id number]    Loli Erwin MD  Patient Care Team:  Loli Erwin MD as PCP - General (Family Medicine)  Juventino Higuera MD as Consulting Physician (Ophthalmology)  Edilia Vasquez LCSW as  (Psychiatry)  Tito Anderson, PENNY as Consulting Physician (Optometry)          Visit Type:a scheduled routine follow-up visit    Chief Complaint:  Chief Complaint   Patient presents with    Annual Exam       History of Present Illness:  78 year old  Annual check up    DM2  Lab Results   Component Value Date    HGBA1C 5.4 10/25/2023   On metformin.    Seeing vascular for the legs  Venous insuff.  She has cyancoacrylate ablation with vascular.  She is has continue issues with knee arthritis.    Remains on Statin  Lab Results   Component Value Date    LDLCALC 54.6 (L) 10/25/2023   On Pravachol    Depression and mood sx with Wellbutrin, Trazodone for sleep.Paxil for SSRI, and Klonopin Prn  Medicine regime unchanged and still affective.      History:  Past Medical History:   Diagnosis Date    Anxiety     Anxiety     Cataract (lens) fragments in eye following cataract surgery, bilateral     DM (diabetes mellitus) 2013     10/21/2018    DM (diabetes mellitus) 2013     am 12/09/2019    DM2 (diabetes mellitus, type 2) 01/13    GERD (gastroesophageal reflux disease)     Hiatal hernia     Mild nonproliferative diabetic retinopathy of both eyes 1/22/15    OS>OD    Mild nonproliferative diabetic retinopathy of both eyes 1/22/2015    OS>OD     Osteoarthritis      Past Surgical History:   Procedure Laterality Date    AUGMENTATION OF BREAST      saline    BREAST SURGERY Bilateral 1992    saline implants    BUNIONECTOMY      CATARACT EXTRACTION W/  INTRAOCULAR LENS IMPLANT Bilateral     12/12/18 OD, 1/9/19 OS, Dr Higuera    CHOLECYSTECTOMY  05/12    COLONOSCOPY  08    1 POLYP    COLONOSCOPY N/A 12/6/2016    Procedure: COLONOSCOPY with biopsies;  Surgeon: Sarah GOLDBERG  MD Roxana;  Location: [facility];  Service: Endoscopy;  Laterality: N/A;    COLONOSCOPY N/A 10/19/2022    Procedure: COLONOSCOPY;  Surgeon: Lindsey Gregory MD;  Location: [facility] ENDO;  Service: Endoscopy;  Laterality: N/A;    ESOPHAGOGASTRODUODENOSCOPY N/A 1/31/2023    Procedure: EGD (ESOPHAGOGASTRODUODENOSCOPY);  Surgeon: Harvinder Head MD;  Location: [facility] ENDO;  Service: Endoscopy;  Laterality: N/A;    PCIOL  Right 12/12/2018    DR. JUAREZ    TONSILLECTOMY, ADENOIDECTOMY       Family History   Problem Relation Name Age of Onset    Heart failure Father      Suicide Father      Diabetes Mother      Heart failure Mother      Hypertension Mother      Stroke Mother      Heart failure Brother      Cancer Brother          full body    Hypertension Brother      Coronary artery disease Brother      Diabetes Maternal Grandmother      Hypertension Son      Suicide Son       Social History     Socioeconomic History    Marital status:    Tobacco Use    Smoking status: Never     Passive exposure: Never    Smokeless tobacco: Never   Substance and Sexual Activity    Alcohol use: Yes     Comment: occasionally    Drug use: No    Sexual activity: Yes     Partners: Male     Social Determinants of Health     Financial Resource Strain: Low Risk  (3/19/2021)    Overall Financial Resource Strain (CARDIA)     Difficulty of Paying Living Expenses: Not hard at all   Food Insecurity: No Food Insecurity (3/19/2021)    Hunger Vital Sign     Worried About Running Out of Food in the Last Year: Never true     Ran Out of Food in the Last Year: Never true   Transportation Needs: No Transportation Needs (3/19/2021)    PRAPARE - Transportation     Lack of Transportation (Medical): No     Lack of Transportation (Non-Medical): No   Physical Activity: Sufficiently Active (3/19/2021)    Exercise Vital Sign     Days of Exercise per Week: 5 days     Minutes of Exercise per Session: 60 min   Stress: No Stress Concern Present (3/19/2021)     [facility] of Occupational [facility] - Occupational Stress Questionnaire     Feeling of Stress : Not at all   Housing Stability: Low Risk  (3/19/2021)    Housing Stability Vital Sign     Unable to Pay for Housing in the Last Year: No     Number of Places Lived in the Last Year: 1     Unstable Housing in the Last Year: No     Patient Active Problem List   Diagnosis    Generalized anxiety disorder    History of diabetic retinopathy Mild nonproliferative diabetic retinopathy of both eyes (None on eye exam 11/26/2018)    Mixed stress and urge urinary incontinence    Controlled type 2 diabetes mellitus with complication, without long-term current use of insulin    Nuclear sclerosis of both eyes    Diabetes mellitus type 2 without retinopathy    Hyperlipidemia associated with type 2 diabetes mellitus    Primary snoring    Nocturnal hypoxemia    Major depressive disorder, recurrent episode, moderate    Personal history of colonic polyps    Nausea     Review of patient's allergies indicates:   Allergen Reactions    Hydrocodone Nausea And Vomiting     Once only - on empty stomach       The following were reviewed at this visit: active problem list, medication list, allergies, family history, social history, and health maintenance.    Medications:  Current Outpatient Medications on File Prior to Visit   Medication Sig Dispense Refill    aspirin (ECOTRIN) 81 MG EC tablet Take 81 mg by mouth once daily.      blood sugar diagnostic (BLOOD GLUCOSE TEST) Strp 1 each by Misc.(Non-Drug; Combo Route) route once daily. One touch verio  each 4    blood-glucose meter kit Use as instructed 1 each 0    buPROPion (WELLBUTRIN XL) 300 MG 24 hr tablet Take 1 tablet (300 mg total) by mouth once daily. 30 tablet 11    cholecalciferol, vitamin D3, (VITAMIN D3) 25 mcg (1,000 unit) capsule Take 1,000 Units by mouth once daily.      clonazePAM (KLONOPIN) 0.5 MG tablet 1/2 to 1 up to BID prn severe anxiety 30 tablet 0    cyanocobalamin  (VITAMIN B-12) 1000 MCG tablet Take 100 mcg by mouth once daily. Taking a B-complex      esomeprazole (NEXIUM) 40 MG capsule Take 1 capsule by mouth every morning.      fish oil-omega-3 fatty acids 300-1,000 mg capsule Take 2 g by mouth once daily.      fluticasone propionate (FLONASE) 50 mcg/actuation nasal spray 1 spray in each nostril 15.8 mL 2    lancets (ONE TOUCH DELICA LANCETS) 33 gauge Misc 1 lancet by Misc.(Non-Drug; Combo Route) route 2 (two) times daily. 200 each 3    levocetirizine (XYZAL) 5 MG tablet Take 1 tablet (5 mg total) by mouth every evening. 30 tablet 11    lysine 500 mg Tab Take 500 mg by mouth once daily.      metFORMIN (GLUCOPHAGE-XR) 500 MG ER 24hr tablet Take 2 tablets (1,000 mg total) by mouth 2 (two) times daily. 360 tablet 1    montelukast (SINGULAIR) 10 mg tablet 1 tablet in the evening      multivitamin capsule Take 1 capsule by mouth once daily.      ondansetron (ZOFRAN) 4 MG tablet Take 1 tablet (4 mg total) by mouth every 6 (six) hours as needed for Nausea. 20 tablet 0    oxybutynin (DITROPAN) 5 MG Tab TAKE 1 TABLET TWICE DAILY 180 tablet 1    pantoprazole (PROTONIX) 40 MG tablet TAKE 1 TABLET EVERY DAY 90 tablet 3    paroxetine (PAXIL) 30 MG tablet TAKE 1 TABLET EVERY MORNING 90 tablet 1    PHENAZOPYRIDINE HCL (AZO ORAL) Take 1 tablet by mouth daily as needed.       pravastatin (PRAVACHOL) 40 MG tablet TAKE 1 TABLET EVERY DAY 90 tablet 0    traZODone (DESYREL) 50 MG tablet Take 1 tablet (50 mg total) by mouth every evening. 90 tablet 3     No current facility-administered medications on file prior to visit.       Medications have been reviewed and reconciled with patient at this visit.  Barriers to medications reviewed with patient.    Adverse reactions to current medications reviewed with patient..    Over the counter medications reviewed and reconciled with patient.    Exam:  Wt Readings from Last 3 Encounters:   05/09/24 65.6 kg (144 lb 10 oz)   10/18/23 60.4 kg (133 lb 2.5 oz)    03/08/23 64.2 kg (141 lb 8.6 oz)     Temp Readings from Last 3 Encounters:   05/09/24 97.2 °F (36.2 °C)   10/18/23 98.6 °F (37 °C) (Tympanic)   03/08/23 97.9 °F (36.6 °C)     BP Readings from Last 3 Encounters:   05/09/24 120/66   10/18/23 128/64   03/08/23 120/76     Pulse Readings from Last 3 Encounters:   05/09/24 104   10/18/23 79   03/08/23 86     Body mass index is 24.07 kg/m².      Review of Systems   Constitutional: Negative.  Negative for chills and fever.   HENT: Negative.  Negative for congestion, sinus pain and sore throat.    Eyes:  Negative for blurred vision and double vision.   Respiratory:  Negative for cough, sputum production, shortness of breath and wheezing.    Cardiovascular:  Negative for chest pain, palpitations and leg swelling.   Gastrointestinal:  Negative for abdominal pain, constipation, diarrhea, heartburn, nausea and vomiting.   Genitourinary: Negative.    Musculoskeletal:  Positive for joint pain.   Skin: Negative.  Negative for rash.   Neurological: Negative.    Endo/Heme/Allergies: Negative.  Negative for polydipsia. Does not bruise/bleed easily.   Psychiatric/Behavioral:  Negative for depression and substance abuse.      Physical Exam  Nursing note reviewed.   Cardiovascular:      Rate and Rhythm: Normal rate and regular rhythm.   Pulmonary:      Effort: Pulmonary effort is normal. No respiratory distress.   Neurological:      Mental Status: She is alert and oriented to person, place, and time.   Psychiatric:         Mood and Affect: Mood normal.         Behavior: Behavior normal.         Thought Content: Thought content normal.         Judgment: Judgment normal.         Laboratory Reviewed ({Yes)  Lab Results   Component Value Date    WBC 5.60 08/19/2022    HGB 13.4 08/19/2022    HCT 39.9 08/19/2022     08/19/2022    CHOL 120 10/25/2023    TRIG 72 10/25/2023    HDL 51 10/25/2023    ALT 21 10/25/2023    AST 22 10/25/2023     10/25/2023    K 4.3 10/25/2023      10/25/2023    CREATININE 0.7 10/25/2023    BUN 12 10/25/2023    CO2 27 10/25/2023    HGBA1C 5.4 10/25/2023       Connie was seen today for annual exam.    Diagnoses and all orders for this visit:    Controlled type 2 diabetes mellitus with complication, without long-term current use of insulin  -     Ambulatory referral/consult to Optometry; Future  -     Comprehensive Metabolic Panel; Future  -     Hemoglobin A1C; Future  -     Hemoglobin A1C; Future  -     Microalbumin/Creatinine Ratio, Urine; Future    Hyperlipidemia associated with type 2 diabetes mellitus  -     Comprehensive Metabolic Panel; Future  -     Comprehensive Metabolic Panel; Future  -     Lipid Panel; Future    Major depressive disorder, recurrent episode, moderate  Generalized anxiety disorder  Stay on same meds    Annual physical exam  -     Comprehensive Metabolic Panel; Future  -     Lipid Panel; Future      HgA1c today    Labs in 6 months for annual              Care Plan/Goals: Reviewed    Goals    None         Follow up: Follow up in about 6 months (around 11/9/2024).    After visit summary was printed and given to patient upon discharge today.  Patient goals and care plan are included in After Visit Summary.

## 2024-05-10 ENCOUNTER — PATIENT MESSAGE (OUTPATIENT)
Dept: INTERNAL MEDICINE | Facility: CLINIC | Age: 79
End: 2024-05-10
Payer: MEDICARE

## 2024-05-23 ENCOUNTER — OFFICE VISIT (OUTPATIENT)
Dept: OPHTHALMOLOGY | Facility: CLINIC | Age: 79
End: 2024-05-23
Payer: MEDICARE

## 2024-05-23 DIAGNOSIS — E11.9 TYPE 2 DIABETES MELLITUS WITHOUT RETINOPATHY: Primary | ICD-10-CM

## 2024-05-23 DIAGNOSIS — H35.3131 EARLY DRY STAGE NONEXUDATIVE AGE-RELATED MACULAR DEGENERATION OF BOTH EYES: ICD-10-CM

## 2024-05-23 DIAGNOSIS — F43.0 STRESS REACTION: ICD-10-CM

## 2024-05-23 DIAGNOSIS — Z96.1 PSEUDOPHAKIA OF BOTH EYES: ICD-10-CM

## 2024-05-23 PROCEDURE — 92015 DETERMINE REFRACTIVE STATE: CPT | Mod: HCNC,S$GLB,, | Performed by: OPHTHALMOLOGY

## 2024-05-23 PROCEDURE — 99999 PR PBB SHADOW E&M-EST. PATIENT-LVL III: CPT | Mod: PBBFAC,HCNC,, | Performed by: OPHTHALMOLOGY

## 2024-05-23 PROCEDURE — 2023F DILAT RTA XM W/O RTNOPTHY: CPT | Mod: HCNC,CPTII,S$GLB, | Performed by: OPHTHALMOLOGY

## 2024-05-23 PROCEDURE — 99214 OFFICE O/P EST MOD 30 MIN: CPT | Mod: HCNC,S$GLB,, | Performed by: OPHTHALMOLOGY

## 2024-05-23 PROCEDURE — 1159F MED LIST DOCD IN RCRD: CPT | Mod: HCNC,CPTII,S$GLB, | Performed by: OPHTHALMOLOGY

## 2024-05-23 PROCEDURE — 92134 CPTRZ OPH DX IMG PST SGM RTA: CPT | Mod: HCNC,S$GLB,, | Performed by: OPHTHALMOLOGY

## 2024-05-23 PROCEDURE — 1160F RVW MEDS BY RX/DR IN RCRD: CPT | Mod: HCNC,CPTII,S$GLB, | Performed by: OPHTHALMOLOGY

## 2024-05-23 RX ORDER — BUPROPION HYDROCHLORIDE 300 MG/1
TABLET ORAL
Qty: 90 TABLET | Refills: 0 | OUTPATIENT
Start: 2024-05-23

## 2024-05-23 NOTE — TELEPHONE ENCOUNTER
Refill Routing Note   Medication(s) are not appropriate for processing by [facility] for the following reason(s):        Outside of protocol    [facility] action(s):  Route  Quick Discontinue        Medication Therapy Plan: Request without required information. Pharmacy notified previously.      Appointments  past 12m or future 3m with PCP    Date Provider   Last Visit   5/9/2024 Loli Erwin MD   Next Visit   11/14/2024 Loli Erwin MD   ED visits in past 90 days: 0        Note composed:5:41 PM 05/23/2024

## 2024-05-23 NOTE — TELEPHONE ENCOUNTER
No care due was identified.  [facility] Embedded Care Due Messages. Reference number: [id number].   5/23/2024 4:54:59 PM CDT

## 2024-05-23 NOTE — PROGRESS NOTES
HPI     Annual Exam     Additional comments: Pt states she has not noticed any significant   changes in her vision since her last visit. Pt states she is not currently   using any eye drops. Pt denies any pain or irritation at this time.           Comments    PCP: DONI Ash   Referred by [facility]     1. DM 2014   2.PCIOL OD +21.5 SN60WF (distance) 12/12/18   PCIOL OS +20.5 SN60WF (Distance) 1-9-19   3. Foveal CRS OD             Last edited by Taisha Dumont on 5/23/2024  2:41 PM.            Assessment /Plan     For exam results, see Encounter Report.    Type 2 diabetes mellitus without retinopathy  Last A1c 5.6 . Diabetes controlled with no diabetic retinopathy on dilated exam. Reviewed diabetic eye precautions including excellent blood sugar control, and importance of regular follow up.     Pseudophakia of both eyes  Stable, monitor yearly. Mrx provided.    Early dry stage nonexudative age-related macular degeneration of both eyes  Exam consistent with mild age related macular degeneration OD > OS. Discussed clinical condition and recommend avoidance of tobacco products. Patient advised to call immediately if any visual changes occur. Amsler grid provided. Continue Ocuvite. Regular follow up recommended.       RTC 1 year with mOCT, sooner prn

## 2024-05-24 RX ORDER — CLONAZEPAM 0.5 MG/1
TABLET ORAL
Qty: 30 TABLET | Refills: 0 | Status: SHIPPED | OUTPATIENT
Start: 2024-05-24

## 2024-06-10 RX ORDER — METFORMIN HYDROCHLORIDE 500 MG/1
1000 TABLET, EXTENDED RELEASE ORAL 2 TIMES DAILY
Qty: 360 TABLET | Refills: 1 | Status: SHIPPED | OUTPATIENT
Start: 2024-06-10

## 2024-06-10 NOTE — TELEPHONE ENCOUNTER
No care due was identified.  Health [facility] Embedded Care Due Messages. Reference number: [id number].   6/10/2024 1:26:22 AM CDT

## 2024-06-10 NOTE — TELEPHONE ENCOUNTER
Refill Decision Note   Connie Diamond  is requesting a refill authorization.  Brief Assessment and Rationale for Refill:  Approve     Medication Therapy Plan:         Comments:     Note composed:3:39 AM 06/10/2024

## 2024-10-07 ENCOUNTER — TELEPHONE (OUTPATIENT)
Dept: INTERNAL MEDICINE | Facility: CLINIC | Age: 79
End: 2024-10-07
Payer: MEDICARE

## 2024-10-07 NOTE — TELEPHONE ENCOUNTER
----- Message from Rick sent at 10/7/2024 10:32 AM CDT -----  Name of Who is Calling:GREGORY ANNA [[id number]]        What is the request in detail:Pt would like a callback from the office in regards to scheduling appt for this week for possible IBS-diarrhea, if not pt spouse is will to switch as he has appt scheduled for 10/28. Please advise thank you       Can the clinic reply by [facility]:NO         What Number to Call Back if not in [facility]:Telephone Information:Mobile [phone] if no answer please call  [phone]

## 2024-10-08 ENCOUNTER — HOSPITAL ENCOUNTER (OUTPATIENT)
Dept: RADIOLOGY | Facility: HOSPITAL | Age: 79
Discharge: HOME | End: 2024-10-08
Attending: INTERNAL MEDICINE
Payer: MEDICARE

## 2024-10-08 ENCOUNTER — OFFICE VISIT (OUTPATIENT)
Dept: GASTROENTEROLOGY | Facility: CLINIC | Age: 79
End: 2024-10-08
Payer: MEDICARE

## 2024-10-08 VITALS
WEIGHT: 147.38 LBS | SYSTOLIC BLOOD PRESSURE: 120 MMHG | BODY MASS INDEX: 24.55 KG/M2 | DIASTOLIC BLOOD PRESSURE: 62 MMHG | HEIGHT: 65 IN

## 2024-10-08 DIAGNOSIS — K44.9 HIATAL HERNIA: ICD-10-CM

## 2024-10-08 DIAGNOSIS — R19.7 DIARRHEA, UNSPECIFIED TYPE: Primary | ICD-10-CM

## 2024-10-08 DIAGNOSIS — R14.0 ABDOMINAL BLOATING: ICD-10-CM

## 2024-10-08 DIAGNOSIS — K21.9 GASTROESOPHAGEAL REFLUX DISEASE, UNSPECIFIED WHETHER ESOPHAGITIS PRESENT: ICD-10-CM

## 2024-10-08 DIAGNOSIS — R19.7 DIARRHEA, UNSPECIFIED TYPE: ICD-10-CM

## 2024-10-08 PROCEDURE — 3078F DIAST BP <80 MM HG: CPT | Mod: HCNC,CPTII,S$GLB, | Performed by: INTERNAL MEDICINE

## 2024-10-08 PROCEDURE — 1159F MED LIST DOCD IN RCRD: CPT | Mod: HCNC,CPTII,S$GLB, | Performed by: INTERNAL MEDICINE

## 2024-10-08 PROCEDURE — 3074F SYST BP LT 130 MM HG: CPT | Mod: HCNC,CPTII,S$GLB, | Performed by: INTERNAL MEDICINE

## 2024-10-08 PROCEDURE — 3288F FALL RISK ASSESSMENT DOCD: CPT | Mod: HCNC,CPTII,S$GLB, | Performed by: INTERNAL MEDICINE

## 2024-10-08 PROCEDURE — 1126F AMNT PAIN NOTED NONE PRSNT: CPT | Mod: HCNC,CPTII,S$GLB, | Performed by: INTERNAL MEDICINE

## 2024-10-08 PROCEDURE — 99214 OFFICE O/P EST MOD 30 MIN: CPT | Mod: HCNC,S$GLB,, | Performed by: INTERNAL MEDICINE

## 2024-10-08 PROCEDURE — 99999 PR PBB SHADOW E&M-EST. PATIENT-LVL IV: CPT | Mod: PBBFAC,HCNC,, | Performed by: INTERNAL MEDICINE

## 2024-10-08 PROCEDURE — 74018 RADEX ABDOMEN 1 VIEW: CPT | Mod: 26,HCNC,, | Performed by: RADIOLOGY

## 2024-10-08 PROCEDURE — 74018 RADEX ABDOMEN 1 VIEW: CPT | Mod: TC,HCNC

## 2024-10-08 PROCEDURE — 1101F PT FALLS ASSESS-DOCD LE1/YR: CPT | Mod: HCNC,CPTII,S$GLB, | Performed by: INTERNAL MEDICINE

## 2024-10-08 NOTE — PROGRESS NOTES
[facility]  Gastroenterology    PCP: Loli Erwin MD    10/8/24    Reason for Visit: Diarrhea    Subjective:   Connie Fields is a 79 y.o. female here for evaluation of diarrhea. Patient states that for the past month and a half she has had issues with diarrhea. States it comes on with urgency after meals but sometimes can be unassociated with meals. Mostly is watery diarrhea and other times may not be watery. Diarrhea is associated with bloating and abdominal fullness. Denies abdominal pain, recent antibiotic use or travel. Last colonoscopy in 2022 with diverticulosis and recall of 5 years recommended. Patient also has history of GERD, EGD in 01/2023 with small hiatal hernia and erosive gastropathy. She takes PPI daily. Reflux is mostly controlled, occasionally gets some reflux symptoms but not regularly.       Past Medical History:   Diagnosis Date    Anxiety     Anxiety     Cataract (lens) fragments in eye following cataract surgery, bilateral     DM (diabetes mellitus) 2013     10/21/2018    DM (diabetes mellitus) 2013     am 12/09/2019    DM2 (diabetes mellitus, type 2) 01/13    GERD (gastroesophageal reflux disease)     Hiatal hernia     Mild nonproliferative diabetic retinopathy of both eyes 1/22/15    OS>OD    Mild nonproliferative diabetic retinopathy of both eyes 1/22/2015    OS>OD     Osteoarthritis        Past Surgical History:   Procedure Laterality Date    AUGMENTATION OF BREAST      saline    BREAST SURGERY Bilateral 1992    saline implants    BUNIONECTOMY      CATARACT EXTRACTION W/  INTRAOCULAR LENS IMPLANT Bilateral     12/12/18 OD, 1/9/19 OS, Dr Higuera    CHOLECYSTECTOMY  05/12    COLONOSCOPY  08    1 POLYP    COLONOSCOPY N/A 12/6/2016    Procedure: COLONOSCOPY with biopsies;  Surgeon: Sarah Schmidt MD;  Location: [facility];  Service: Endoscopy;  Laterality: N/A;    COLONOSCOPY N/A 10/19/2022    Procedure: COLONOSCOPY;  Surgeon: Lindsey Gregory  MD;  Location: [facility];  Service: Endoscopy;  Laterality: N/A;    ESOPHAGOGASTRODUODENOSCOPY N/A 1/31/2023    Procedure: EGD (ESOPHAGOGASTRODUODENOSCOPY);  Surgeon: Harvinder Head MD;  Location: [facility];  Service: Endoscopy;  Laterality: N/A;    PCIOL  Right 12/12/2018    DR. JUAREZ    TONSILLECTOMY, ADENOIDECTOMY         Current Outpatient Medications on File Prior to Visit   Medication Sig Dispense Refill    aspirin (ECOTRIN) 81 MG EC tablet Take 81 mg by mouth once daily.      blood sugar diagnostic (BLOOD GLUCOSE TEST) Strp 1 each by Misc.(Non-Drug; Combo Route) route once daily. One touch verio  each 4    blood-glucose meter kit Use as instructed 1 each 0    buPROPion (WELLBUTRIN XL) 300 MG 24 hr tablet Take 1 tablet (300 mg total) by mouth once daily. 30 tablet 11    cholecalciferol, vitamin D3, (VITAMIN D3) 25 mcg (1,000 unit) capsule Take 1,000 Units by mouth once daily.      clonazePAM (KLONOPIN) 0.5 MG tablet PRN severe anxiety. 30 tablet 0    cyanocobalamin (VITAMIN B-12) 1000 MCG tablet Take 100 mcg by mouth once daily. Taking a B-complex      esomeprazole (NEXIUM) 40 MG capsule Take 1 capsule by mouth every morning.      fish oil-omega-3 fatty acids 300-1,000 mg capsule Take 2 g by mouth once daily.      fluticasone propionate (FLONASE) 50 mcg/actuation nasal spray 1 spray in each nostril 15.8 mL 2    lancets (ONE TOUCH DELICA LANCETS) 33 gauge Misc 1 lancet by Misc.(Non-Drug; Combo Route) route 2 (two) times daily. 200 each 3    levocetirizine (XYZAL) 5 MG tablet Take 1 tablet (5 mg total) by mouth every evening. 30 tablet 11    lysine 500 mg Tab Take 500 mg by mouth once daily.      metFORMIN (GLUCOPHAGE-XR) 500 MG ER 24hr tablet TAKE 2 TABLETS TWICE DAILY 360 tablet 1    montelukast (SINGULAIR) 10 mg tablet 1 tablet in the evening      multivitamin capsule Take 1 capsule by mouth once daily.      ondansetron (ZOFRAN) 4 MG tablet Take 1 tablet (4 mg total) by mouth every 6 (six)  hours as needed for Nausea. 20 tablet 0    oxybutynin (DITROPAN) 5 MG Tab TAKE 1 TABLET TWICE DAILY 180 tablet 1    pantoprazole (PROTONIX) 40 MG tablet TAKE 1 TABLET EVERY DAY 90 tablet 3    paroxetine (PAXIL) 30 MG tablet TAKE 1 TABLET EVERY MORNING 90 tablet 1    PHENAZOPYRIDINE HCL (AZO ORAL) Take 1 tablet by mouth daily as needed.       pravastatin (PRAVACHOL) 40 MG tablet TAKE 1 TABLET EVERY DAY 90 tablet 0    traZODone (DESYREL) 50 MG tablet Take 1 tablet (50 mg total) by mouth every evening. 90 tablet 3     No current facility-administered medications on file prior to visit.       Review of patient's allergies indicates:   Allergen Reactions    Hydrocodone Nausea And Vomiting     Once only - on empty stomach       Social History     Socioeconomic History    Marital status:    Tobacco Use    Smoking status: Never     Passive exposure: Never    Smokeless tobacco: Never   Substance and Sexual Activity    Alcohol use: Yes     Comment: occasionally    Drug use: No    Sexual activity: Yes     Partners: Male     Social Drivers of Health     Financial Resource Strain: Low Risk  (3/19/2021)    Overall Financial Resource Strain (CARDIA)     Difficulty of Paying Living Expenses: Not hard at all   Food Insecurity: No Food Insecurity (3/19/2021)    Hunger Vital Sign     Worried About Running Out of Food in the Last Year: Never true     Ran Out of Food in the Last Year: Never true   Transportation Needs: No Transportation Needs (3/19/2021)    PRAPARE - Transportation     Lack of Transportation (Medical): No     Lack of Transportation (Non-Medical): No   Physical Activity: Sufficiently Active (3/19/2021)    Exercise Vital Sign     Days of Exercise per Week: 5 days     Minutes of Exercise per Session: 60 min   Stress: No Stress Concern Present (3/19/2021)    [employer] - Occupational Stress Questionnaire     Feeling of Stress : Not at all   Housing Stability: Low Risk  (3/19/2021)     Housing Stability Vital Sign     Unable to Pay for Housing in the Last Year: No     Number of Places Lived in the Last Year: 1     Unstable Housing in the Last Year: No       Family History   Problem Relation Name Age of Onset    Heart failure Father      Suicide Father      Diabetes Mother      Heart failure Mother      Hypertension Mother      Stroke Mother      Heart failure Brother      Cancer Brother          full body    Hypertension Brother      Coronary artery disease Brother      Diabetes Maternal Grandmother      Hypertension Son      Suicide Son         Review of Systems   Constitutional:  Positive for appetite change. Negative for fever and unexpected weight change.   HENT:  Negative for postnasal drip, rhinorrhea, sneezing, sore throat and trouble swallowing.    Eyes:  Negative for visual disturbance.   Respiratory:  Negative for cough, shortness of breath and wheezing.    Cardiovascular:  Negative for chest pain, palpitations and leg swelling.   Gastrointestinal:  Positive for abdominal distention and diarrhea. Negative for abdominal pain, blood in stool, constipation, nausea and vomiting.   Genitourinary:  Negative for dysuria.   Musculoskeletal:  Negative for arthralgias, joint swelling and myalgias.   Skin:  Negative for color change, pallor and rash.   Neurological:  Negative for weakness, light-headedness, numbness and headaches.   Hematological:  Negative for adenopathy. Does not bruise/bleed easily.   Psychiatric/Behavioral:  Negative for agitation.        Objective:   Vitals:   Vitals:    10/08/24 0744   BP: 120/62       Physical Exam  Vitals reviewed.   Constitutional:       General: She is not in acute distress.     Appearance: She is not diaphoretic.   HENT:      Head: Normocephalic and atraumatic.      Mouth/Throat:      Pharynx: No oropharyngeal exudate.   Eyes:      General: No scleral icterus.        Right eye: No discharge.         Left eye: No discharge.      Conjunctiva/sclera:  Conjunctivae normal.      Pupils: Pupils are equal, round, and reactive to light.   Cardiovascular:      Rate and Rhythm: Normal rate and regular rhythm.   Abdominal:      General: There is no distension.      Palpations: Abdomen is soft. There is no mass.      Tenderness: There is no abdominal tenderness. There is no guarding.   Musculoskeletal:         General: Normal range of motion.      Cervical back: Normal range of motion.   Skin:     General: Skin is warm and dry.      Coloration: Skin is not pale.      Findings: No erythema or rash.   Neurological:      Mental Status: She is alert and oriented to person, place, and time.       IMPRESSION     Problem List Items Addressed This Visit    None  Visit Diagnoses       Diarrhea, unspecified type    -  Primary    Relevant Orders    Clostridium difficile EIA    Giardia / Cryptosporidum, EIA    Stool culture    Stool Exam-Ova,Cysts,Parasites    X-Ray Abdomen AP 1 View    Abdominal bloating        Gastroesophageal reflux disease, unspecified whether esophagitis present        Hiatal hernia                PLANS:    - Will obtain stool studies to r/o infection  - KUB to assess stool burden and r/o constipation with overflow  - If the above is negative, need to repeat colonoscopy with biopsies to r/o microscopic colitis  - Prior EGD and colonoscopy reviewed  - Continue with Protonix for history of GERD/hiatal hernia  - Reflux precautions  - Further recommendations pending the above    Diarrhea, unspecified type  -     Clostridium difficile EIA; Future; Expected date: 10/08/2024  -     Giardia / Cryptosporidum, EIA; Future; Expected date: 10/08/2024  -     Stool culture; Future; Expected date: 10/08/2024  -     Stool Exam-Ova,Cysts,Parasites; Future; Expected date: 10/08/2024  -     X-Ray Abdomen AP 1 View; Future; Expected date: 10/08/2024    Abdominal bloating    Gastroesophageal reflux disease, unspecified whether esophagitis present    Hiatal hernia        Lindsey  MD [facility]  Gastroenterology

## 2024-10-10 ENCOUNTER — LAB VISIT (OUTPATIENT)
Dept: LAB | Facility: HOSPITAL | Age: 79
End: 2024-10-10
Attending: INTERNAL MEDICINE
Payer: MEDICARE

## 2024-10-10 DIAGNOSIS — R19.7 DIARRHEA, UNSPECIFIED TYPE: ICD-10-CM

## 2024-10-10 LAB
C DIFF GDH STL QL: NEGATIVE
C DIFF TOX A+B STL QL IA: NEGATIVE

## 2024-10-10 PROCEDURE — 87046 STOOL CULTR AEROBIC BACT EA: CPT | Mod: HCNC | Performed by: INTERNAL MEDICINE

## 2024-10-10 PROCEDURE — 87045 FECES CULTURE AEROBIC BACT: CPT | Mod: HCNC | Performed by: INTERNAL MEDICINE

## 2024-10-10 PROCEDURE — 87427 SHIGA-LIKE TOXIN AG IA: CPT | Mod: 59,HCNC | Performed by: INTERNAL MEDICINE

## 2024-10-10 PROCEDURE — 87329 GIARDIA AG IA: CPT | Mod: HCNC | Performed by: INTERNAL MEDICINE

## 2024-10-10 PROCEDURE — 87449 NOS EACH ORGANISM AG IA: CPT | Mod: 91,HCNC | Performed by: INTERNAL MEDICINE

## 2024-10-10 PROCEDURE — 87449 NOS EACH ORGANISM AG IA: CPT | Mod: HCNC | Performed by: INTERNAL MEDICINE

## 2024-10-10 PROCEDURE — 87324 CLOSTRIDIUM AG IA: CPT | Mod: HCNC | Performed by: INTERNAL MEDICINE

## 2024-10-10 PROCEDURE — 87328 CRYPTOSPORIDIUM AG IA: CPT | Mod: HCNC | Performed by: INTERNAL MEDICINE

## 2024-10-10 PROCEDURE — 87177 OVA AND PARASITES SMEARS: CPT | Mod: HCNC | Performed by: INTERNAL MEDICINE

## 2024-10-10 PROCEDURE — 87209 SMEAR COMPLEX STAIN: CPT | Mod: HCNC | Performed by: INTERNAL MEDICINE

## 2024-10-11 LAB
CRYPTOSP AG STL QL IA: NEGATIVE
E COLI SXT1 STL QL IA: NEGATIVE
E COLI SXT2 STL QL IA: NEGATIVE
G LAMBLIA AG STL QL IA: NEGATIVE

## 2024-10-12 LAB — BACTERIA STL CULT: NORMAL

## 2024-10-17 DIAGNOSIS — K21.9 GASTROESOPHAGEAL REFLUX DISEASE, UNSPECIFIED WHETHER ESOPHAGITIS PRESENT: ICD-10-CM

## 2024-10-17 LAB — O+P STL MICRO: NORMAL

## 2024-10-17 RX ORDER — PANTOPRAZOLE SODIUM 40 MG/1
40 TABLET, DELAYED RELEASE ORAL
Qty: 90 TABLET | Refills: 1 | Status: SHIPPED | OUTPATIENT
Start: 2024-10-17

## 2024-10-17 NOTE — TELEPHONE ENCOUNTER
Refill Decision Note   Connie Diamond  is requesting a refill authorization.  Brief Assessment and Rationale for Refill:  Approve     Medication Therapy Plan:  FLOS      Pharmacist review requested: Yes   Extended chart review required: Yes   Comments:     Note composed:12:44 PM 10/17/2024

## 2024-10-17 NOTE — TELEPHONE ENCOUNTER
Care Due:                  Date            Visit Type   Department     Provider  --------------------------------------------------------------------------------                                MYCHART                              FOLLOWUP/OF  [facility] INTERNAL  Last Visit: 05-      FICE VISIT   MEDICINE       Loli Erwin                              EP -                              PRIMARY      [facility] INTERNAL  Next Visit: 11-      CARE ([facility])   [facility]       Loli Erwin                                                            Last  Test          Frequency    Reason                     Performed    Due Date  --------------------------------------------------------------------------------    HBA1C.......  6 months...  metFORMIN................  05-   11-    [employer] Embedded Care Due Messages. Reference number: [id number].   10/17/2024 2:07:53 AM CDT

## 2024-10-17 NOTE — TELEPHONE ENCOUNTER
Refill Routing Note   Medication(s) are not appropriate for processing by [facility] for the following reason(s):        Drug-drug interaction    [facility] action(s):  Defer        Medication Therapy Plan: Drug drug: pantoprazole and esomeprazole; FLOS    Pharmacist review requested: Yes     Appointments  past 12m or future 3m with PCP    Date Provider   Last Visit   5/9/2024 Loli Erwin MD   Next Visit   11/14/2024 Loli Erwin MD   ED visits in past 90 days: 0        Note composed:11:23 AM 10/17/2024

## 2024-10-25 DIAGNOSIS — F41.1 GENERALIZED ANXIETY DISORDER: ICD-10-CM

## 2024-10-25 NOTE — TELEPHONE ENCOUNTER
No care due was identified.  Health system Embedded Care Due Messages. Reference number: [id number].   10/25/2024 11:56:57 AM CDT

## 2024-10-26 DIAGNOSIS — G47.00 INSOMNIA, UNSPECIFIED TYPE: ICD-10-CM

## 2024-10-26 DIAGNOSIS — N39.46 MIXED STRESS AND URGE URINARY INCONTINENCE: ICD-10-CM

## 2024-10-26 RX ORDER — PAROXETINE 30 MG/1
TABLET, FILM COATED ORAL
Qty: 90 TABLET | Refills: 0 | OUTPATIENT
Start: 2024-10-26

## 2024-10-26 NOTE — TELEPHONE ENCOUNTER
Refill Decision Note   Connie Fields  is requesting a refill authorization.  Brief Assessment and Rationale for Refill:  Quick Discontinue     Medication Therapy Plan:   Pharmacy is requesting new scripts for the following medications without required information, (sig/ frequency/qty/etc)                 Comments: Pharmacies have been requesting medications for patients without required information, (sig, frequency, qty, etc.). In addition, requests are sent for medication(s) pt. are currently not taking, and medications patients have never taken.    We have spoken to the pharmacies about these request types and advised their teams previously that we are unable to assess these New Script requests and require all details for these requests. This is a known issue and has been reported.      Note composed:12:19 PM 10/26/2024

## 2024-10-27 RX ORDER — METFORMIN HYDROCHLORIDE 500 MG/1
1000 TABLET, EXTENDED RELEASE ORAL 2 TIMES DAILY
Qty: 360 TABLET | Refills: 0 | Status: SHIPPED | OUTPATIENT
Start: 2024-10-27

## 2024-10-27 RX ORDER — OXYBUTYNIN CHLORIDE 5 MG/1
5 TABLET ORAL 2 TIMES DAILY
Qty: 180 TABLET | Refills: 1 | Status: SHIPPED | OUTPATIENT
Start: 2024-10-27

## 2024-10-27 RX ORDER — TRAZODONE HYDROCHLORIDE 50 MG/1
50 TABLET ORAL NIGHTLY
Qty: 90 TABLET | Refills: 1 | Status: SHIPPED | OUTPATIENT
Start: 2024-10-27

## 2024-11-04 ENCOUNTER — PATIENT OUTREACH (OUTPATIENT)
Dept: ADMINISTRATIVE | Facility: HOSPITAL | Age: 79
End: 2024-11-04
Payer: MEDICARE

## 2024-11-11 ENCOUNTER — LAB VISIT (OUTPATIENT)
Dept: LAB | Facility: HOSPITAL | Age: 79
End: 2024-11-11
Attending: FAMILY MEDICINE
Payer: MEDICARE

## 2024-11-11 DIAGNOSIS — E11.69 HYPERLIPIDEMIA ASSOCIATED WITH TYPE 2 DIABETES MELLITUS: ICD-10-CM

## 2024-11-11 DIAGNOSIS — E11.8 CONTROLLED TYPE 2 DIABETES MELLITUS WITH COMPLICATION, WITHOUT LONG-TERM CURRENT USE OF INSULIN: ICD-10-CM

## 2024-11-11 DIAGNOSIS — E78.5 HYPERLIPIDEMIA ASSOCIATED WITH TYPE 2 DIABETES MELLITUS: ICD-10-CM

## 2024-11-11 DIAGNOSIS — Z00.00 ANNUAL PHYSICAL EXAM: ICD-10-CM

## 2024-11-11 LAB
ALBUMIN SERPL BCP-MCNC: 3.9 G/DL (ref 3.5–5.2)
ALBUMIN/CREAT UR: NORMAL UG/MG (ref 0–30)
ALP SERPL-CCNC: 62 U/L (ref 40–150)
ALT SERPL W/O P-5'-P-CCNC: 16 U/L (ref 10–44)
ANION GAP SERPL CALC-SCNC: 7 MMOL/L (ref 8–16)
AST SERPL-CCNC: 20 U/L (ref 10–40)
BILIRUB SERPL-MCNC: 0.9 MG/DL (ref 0.1–1)
BUN SERPL-MCNC: 14 MG/DL (ref 8–23)
CALCIUM SERPL-MCNC: 9.4 MG/DL (ref 8.7–10.5)
CHLORIDE SERPL-SCNC: 106 MMOL/L (ref 95–110)
CHOLEST SERPL-MCNC: 148 MG/DL (ref 120–199)
CHOLEST/HDLC SERPL: 2.9 {RATIO} (ref 2–5)
CO2 SERPL-SCNC: 28 MMOL/L (ref 23–29)
CREAT SERPL-MCNC: 0.7 MG/DL (ref 0.5–1.4)
CREAT UR-MCNC: 59 MG/DL (ref 15–325)
EST. GFR  (NO RACE VARIABLE): >60 ML/MIN/1.73 M^2
ESTIMATED AVG GLUCOSE: 114 MG/DL (ref 68–131)
GLUCOSE SERPL-MCNC: 92 MG/DL (ref 70–110)
HBA1C MFR BLD: 5.6 % (ref 4–5.6)
HDLC SERPL-MCNC: 51 MG/DL (ref 40–75)
HDLC SERPL: 34.5 % (ref 20–50)
LDLC SERPL CALC-MCNC: 78 MG/DL (ref 63–159)
MICROALBUMIN UR DL<=1MG/L-MCNC: <5 UG/ML
NONHDLC SERPL-MCNC: 97 MG/DL
POTASSIUM SERPL-SCNC: 4.1 MMOL/L (ref 3.5–5.1)
PROT SERPL-MCNC: 6.4 G/DL (ref 6–8.4)
SODIUM SERPL-SCNC: 141 MMOL/L (ref 136–145)
TRIGL SERPL-MCNC: 95 MG/DL (ref 30–150)

## 2024-11-11 PROCEDURE — 80061 LIPID PANEL: CPT | Mod: HCNC | Performed by: FAMILY MEDICINE

## 2024-11-11 PROCEDURE — 83036 HEMOGLOBIN GLYCOSYLATED A1C: CPT | Mod: HCNC | Performed by: FAMILY MEDICINE

## 2024-11-11 PROCEDURE — 82570 ASSAY OF URINE CREATININE: CPT | Mod: HCNC | Performed by: FAMILY MEDICINE

## 2024-11-11 PROCEDURE — 80053 COMPREHEN METABOLIC PANEL: CPT | Mod: HCNC | Performed by: FAMILY MEDICINE

## 2024-11-14 ENCOUNTER — HOSPITAL ENCOUNTER (OUTPATIENT)
Dept: RADIOLOGY | Facility: HOSPITAL | Age: 79
Discharge: HOME | End: 2024-11-14
Attending: FAMILY MEDICINE
Payer: MEDICARE

## 2024-11-14 ENCOUNTER — OFFICE VISIT (OUTPATIENT)
Dept: INTERNAL MEDICINE | Facility: CLINIC | Age: 79
End: 2024-11-14
Payer: MEDICARE

## 2024-11-14 VITALS
SYSTOLIC BLOOD PRESSURE: 134 MMHG | RESPIRATION RATE: 16 BRPM | WEIGHT: 147.69 LBS | HEART RATE: 98 BPM | DIASTOLIC BLOOD PRESSURE: 72 MMHG | TEMPERATURE: 97 F | BODY MASS INDEX: 24.58 KG/M2

## 2024-11-14 DIAGNOSIS — S09.90XA HEAD TRAUMA, INITIAL ENCOUNTER: ICD-10-CM
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
## 2024-11-14 DIAGNOSIS — E11.69 HYPERLIPIDEMIA ASSOCIATED WITH TYPE 2 DIABETES MELLITUS: ICD-10-CM

## 2024-11-14 DIAGNOSIS — E11.9 DIABETES MELLITUS TYPE 2 WITHOUT RETINOPATHY: Primary | ICD-10-CM

## 2024-11-14 DIAGNOSIS — E78.5 HYPERLIPIDEMIA ASSOCIATED WITH TYPE 2 DIABETES MELLITUS: ICD-10-CM

## 2024-11-14 DIAGNOSIS — F33.1 MAJOR DEPRESSIVE DISORDER, RECURRENT EPISODE, MODERATE: ICD-10-CM

## 2024-11-14 DIAGNOSIS — E78.2 MIXED HYPERLIPIDEMIA: ICD-10-CM

## 2024-11-14 PROCEDURE — 70450 CT HEAD/BRAIN W/O DYE: CPT | Mod: TC,HCNC

## 2024-11-14 PROCEDURE — G2211 COMPLEX E/M VISIT ADD ON: HCPCS | Mod: HCNC,S$GLB,, | Performed by: FAMILY MEDICINE

## 2024-11-14 PROCEDURE — 70450 CT HEAD/BRAIN W/O DYE: CPT | Mod: 26,HCNC,, | Performed by: RADIOLOGY

## 2024-11-14 PROCEDURE — 99214 OFFICE O/P EST MOD 30 MIN: CPT | Mod: HCNC,S$GLB,, | Performed by: FAMILY MEDICINE

## 2024-11-14 PROCEDURE — 3075F SYST BP GE 130 - 139MM HG: CPT | Mod: HCNC,CPTII,S$GLB, | Performed by: FAMILY MEDICINE

## 2024-11-14 PROCEDURE — 1159F MED LIST DOCD IN RCRD: CPT | Mod: HCNC,CPTII,S$GLB, | Performed by: FAMILY MEDICINE

## 2024-11-14 PROCEDURE — 3078F DIAST BP <80 MM HG: CPT | Mod: HCNC,CPTII,S$GLB, | Performed by: FAMILY MEDICINE

## 2024-11-14 PROCEDURE — 3288F FALL RISK ASSESSMENT DOCD: CPT | Mod: HCNC,CPTII,S$GLB, | Performed by: FAMILY MEDICINE

## 2024-11-14 PROCEDURE — 1100F PTFALLS ASSESS-DOCD GE2>/YR: CPT | Mod: HCNC,CPTII,S$GLB, | Performed by: FAMILY MEDICINE

## 2024-11-14 PROCEDURE — 1160F RVW MEDS BY RX/DR IN RCRD: CPT | Mod: HCNC,CPTII,S$GLB, | Performed by: FAMILY MEDICINE

## 2024-11-14 PROCEDURE — 1126F AMNT PAIN NOTED NONE PRSNT: CPT | Mod: HCNC,CPTII,S$GLB, | Performed by: FAMILY MEDICINE

## 2024-11-14 PROCEDURE — 99999 PR PBB SHADOW E&M-EST. PATIENT-LVL IV: CPT | Mod: PBBFAC,HCNC,, | Performed by: FAMILY MEDICINE

## 2024-11-14 RX ORDER — METFORMIN HYDROCHLORIDE 500 MG/1
1000 TABLET, EXTENDED RELEASE ORAL 2 TIMES DAILY
Qty: 360 TABLET | Refills: 2 | Status: SHIPPED | OUTPATIENT
Start: 2024-11-14

## 2024-11-14 RX ORDER — PRAVASTATIN SODIUM 40 MG/1
40 TABLET ORAL DAILY
Qty: 90 TABLET | Refills: 3 | Status: SHIPPED | OUTPATIENT
Start: 2024-11-14

## 2024-11-14 NOTE — PROGRESS NOTES
Connie Fields  11/14/2024  [id number]    Loli Erwin MD  Patient Care Team:  Loli Erwin MD as PCP - General (Family Medicine)  Juventino Higuera MD as Consulting Physician (Ophthalmology)  Edilia Vasquez LCSW as  (Psychiatry)  Tito Anderson, PENNY as Consulting Physician (Optometry)          Visit Type:a scheduled routine follow-up visit    Chief Complaint:  No chief complaint on file.      History of Present Illness:    History of Present Illness                 The following were reviewed at this visit: active problem list, medication list, allergies, family history, social history, and health maintenance.  History:  Past Medical History:   Diagnosis Date    Anxiety     Anxiety     Cataract (lens) fragments in eye following cataract surgery, bilateral     DM (diabetes mellitus) 2013     10/21/2018    DM (diabetes mellitus) 2013     am 12/09/2019    DM2 (diabetes mellitus, type 2) 01/13    GERD (gastroesophageal reflux disease)     Hiatal hernia     Mild nonproliferative diabetic retinopathy of both eyes 1/22/15    OS>OD    Mild nonproliferative diabetic retinopathy of both eyes 1/22/2015    OS>OD     Osteoarthritis      Past Surgical History:   Procedure Laterality Date    AUGMENTATION OF BREAST      saline    BREAST SURGERY Bilateral 1992    saline implants    BUNIONECTOMY      CATARACT EXTRACTION W/  INTRAOCULAR LENS IMPLANT Bilateral     12/12/18 OD, 1/9/19 OS, Dr Higuera    CHOLECYSTECTOMY  05/12    COLONOSCOPY  08    1 POLYP    COLONOSCOPY N/A 12/6/2016    Procedure: COLONOSCOPY with biopsies;  Surgeon: Sarah Schmidt MD;  Location: [facility];  Service: Endoscopy;  Laterality: N/A;    COLONOSCOPY N/A 10/19/2022    Procedure: COLONOSCOPY;  Surgeon: Lindsey Gregory MD;  Location: [facility];  Service: Endoscopy;  Laterality: N/A;    ESOPHAGOGASTRODUODENOSCOPY N/A 1/31/2023    Procedure: EGD (ESOPHAGOGASTRODUODENOSCOPY);  Surgeon: Harvinder CASTRO  MD Sonido;  Location: [facility];  Service: Endoscopy;  Laterality: N/A;    PCIOL  Right 12/12/2018    DR. JUAREZ    TONSILLECTOMY, ADENOIDECTOMY       Patient Active Problem List   Diagnosis    Generalized anxiety disorder    History of diabetic retinopathy Mild nonproliferative diabetic retinopathy of both eyes (None on eye exam 11/26/2018)    Mixed stress and urge urinary incontinence    Controlled type 2 diabetes mellitus with complication, without long-term current use of insulin    Nuclear sclerosis of both eyes    Diabetes mellitus type 2 without retinopathy    Hyperlipidemia associated with type 2 diabetes mellitus    Primary snoring    Nocturnal hypoxemia    Major depressive disorder, recurrent episode, moderate    Personal history of colonic polyps    Nausea       Medications:  Current Outpatient Medications on File Prior to Visit   Medication Sig Dispense Refill    aspirin (ECOTRIN) 81 MG EC tablet Take 81 mg by mouth once daily.      blood sugar diagnostic (BLOOD GLUCOSE TEST) Strp 1 each by Misc.(Non-Drug; Combo Route) route once daily. One touch verio  each 4    blood-glucose meter kit Use as instructed 1 each 0    buPROPion (WELLBUTRIN XL) 300 MG 24 hr tablet Take 1 tablet (300 mg total) by mouth once daily. 30 tablet 11    cholecalciferol, vitamin D3, (VITAMIN D3) 25 mcg (1,000 unit) capsule Take 1,000 Units by mouth once daily.      clonazePAM (KLONOPIN) 0.5 MG tablet PRN severe anxiety. 30 tablet 0    cyanocobalamin (VITAMIN B-12) 1000 MCG tablet Take 100 mcg by mouth once daily. Taking a B-complex      fish oil-omega-3 fatty acids 300-1,000 mg capsule Take 2 g by mouth once daily.      fluticasone propionate (FLONASE) 50 mcg/actuation nasal spray 1 spray in each nostril 15.8 mL 2    lancets (ONE TOUCH DELICA LANCETS) 33 gauge Misc 1 lancet by Misc.(Non-Drug; Combo Route) route 2 (two) times daily. 200 each 3    levocetirizine (XYZAL) 5 MG tablet Take 1 tablet (5 mg total) by mouth every  evening. 30 tablet 11    lysine 500 mg Tab Take 500 mg by mouth once daily.      metFORMIN (GLUCOPHAGE-XR) 500 MG ER 24hr tablet TAKE 2 TABLETS TWICE DAILY 360 tablet 0    montelukast (SINGULAIR) 10 mg tablet 1 tablet in the evening      multivitamin capsule Take 1 capsule by mouth once daily.      ondansetron (ZOFRAN) 4 MG tablet Take 1 tablet (4 mg total) by mouth every 6 (six) hours as needed for Nausea. 20 tablet 0    oxybutynin (DITROPAN) 5 MG Tab TAKE 1 TABLET TWICE DAILY 180 tablet 1    pantoprazole (PROTONIX) 40 MG tablet TAKE 1 TABLET EVERY DAY 90 tablet 1    paroxetine (PAXIL) 30 MG tablet TAKE 1 TABLET EVERY MORNING 90 tablet 1    PHENAZOPYRIDINE HCL (AZO ORAL) Take 1 tablet by mouth daily as needed.       pravastatin (PRAVACHOL) 40 MG tablet TAKE 1 TABLET EVERY DAY 90 tablet 0    traZODone (DESYREL) 50 MG tablet TAKE 1 TABLET EVERY EVENING 90 tablet 1     No current facility-administered medications on file prior to visit.       Medications have been reviewed and reconciled with patient at this visit.    Exam:  Wt Readings from Last 3 Encounters:   10/08/24 66.8 kg (147 lb 6 oz)   05/09/24 65.6 kg (144 lb 10 oz)   10/18/23 60.4 kg (133 lb 2.5 oz)     Temp Readings from Last 3 Encounters:   05/09/24 97.2 °F (36.2 °C)   10/18/23 98.6 °F (37 °C) (Tympanic)   03/08/23 97.9 °F (36.6 °C)     BP Readings from Last 3 Encounters:   10/08/24 120/62   05/09/24 120/66   10/18/23 128/64     Pulse Readings from Last 3 Encounters:   05/09/24 104   10/18/23 79   03/08/23 86     There is no height or weight on file to calculate BMI.      ROS  Physical Exam  Nursing note reviewed.   Pulmonary:      Effort: Pulmonary effort is normal. No respiratory distress.   Neurological:      Mental Status: She is alert and oriented to person, place, and time.   Psychiatric:         Mood and Affect: Mood normal.         Behavior: Behavior normal.         Thought Content: Thought content normal.         Judgment: Judgment normal.        Physical Exam             Laboratory Reviewed ({Yes)  Lab Results   Component Value Date    WBC 5.60 08/19/2022    HGB 13.4 08/19/2022    HCT 39.9 08/19/2022     08/19/2022    CHOL 148 11/11/2024    TRIG 95 11/11/2024    HDL 51 11/11/2024    ALT 16 11/11/2024    AST 20 11/11/2024     11/11/2024    K 4.1 11/11/2024     11/11/2024    CREATININE 0.7 11/11/2024    BUN 14 11/11/2024    CO2 28 11/11/2024    HGBA1C 5.6 11/11/2024       Diagnoses and all orders for this visit:    Diabetes mellitus type 2 without retinopathy    Hyperlipidemia associated with type 2 diabetes mellitus    Major depressive disorder, recurrent episode, moderate          Assessment & Plan                 Care Plan/Goals: Reviewed     Follow up: No follow-ups on file.    After visit summary was printed and given to patient upon discharge today.  Patient goals and care plan are included in After Visit Summary.
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
## 2024-11-25 NOTE — PROGRESS NOTES
Connie Fields  03/02/2022  [id number]    Loli Erwin MD  Patient Care Team:  Loli Erwin MD as PCP - General (Family Medicine)  Juventino Higuera MD as Consulting Physician (Ophthalmology)  Edilia Vasquez LCSW as  (Psychiatry)  Tito Anderson, OD as Consulting Physician (Optometry)  Has the patient seen any provider outside of the [facility] network since the last visit? (no). If yes, HIPPA forms completed and records requested.        Visit Type:a scheduled routine follow-up visit    Chief Complaint:  Chief Complaint   Patient presents with    Follow-up       History of Present Illness:    76 year old female presents today for a 6m F/U  She states that she has no complaints today  She walks two miles 3 days a week,she bowls, and she is active with working with [employer]    Her  recently made her start taking a med for memory improvement and she states that it has helped    She states that her anxiety has been under control  No SI or HI thoughts  No recent falls     History:  Past Medical History:   Diagnosis Date    Anxiety     Anxiety     Cataract (lens) fragments in eye following cataract surgery, bilateral     DM (diabetes mellitus) 2013     10/21/2018    DM (diabetes mellitus) 2013     am 12/09/2019    DM2 (diabetes mellitus, type 2) 01/13    GERD (gastroesophageal reflux disease)     Hiatal hernia     Mild nonproliferative diabetic retinopathy of both eyes 1/22/15    OS>OD    Mild nonproliferative diabetic retinopathy of both eyes 1/22/2015    OS>OD     Osteoarthritis      Past Surgical History:   Procedure Laterality Date    AUGMENTATION OF BREAST      saline    BREAST SURGERY Bilateral 1992    saline implants    BUNIONECTOMY      CATARACT EXTRACTION W/  INTRAOCULAR LENS IMPLANT Bilateral     12/12/18 OD, 1/9/19 OS, Dr Higuera    CHOLECYSTECTOMY  05/12    COLONOSCOPY  08    1 POLYP    COLONOSCOPY N/A 12/6/2016    Procedure:  COLONOSCOPY with biopsies;  Surgeon: Sarah Schmidt MD;  [facility];  Service: Endoscopy;  Laterality: N/A;    PCIOL  Right 12/12/2018    DR. JUAREZ    TONSILLECTOMY, ADENOIDECTOMY       Family History   Problem Relation Age of Onset    Heart failure Father     Suicide Father     Diabetes Mother     Heart failure Mother     Hypertension Mother     Stroke Mother     Heart failure Brother     Cancer Brother         full body    Hypertension Brother     Coronary artery disease Brother     Diabetes Maternal Grandmother     Hypertension Son     Suicide Son      Social History     Socioeconomic History    Marital status:    Tobacco Use    Smoking status: Never Smoker    Smokeless tobacco: Never Used   Substance and Sexual Activity    Alcohol use: Yes     Comment: occasionally    Drug use: No    Sexual activity: Yes     Partners: Male     Social Determinants of Health     Financial Resource Strain: Low Risk     Difficulty of Paying Living Expenses: Not hard at all   Food Insecurity: No Food Insecurity    Worried About Running Out of Food in the Last Year: Never true    Ran Out of Food in the Last Year: Never true   Transportation Needs: No Transportation Needs    Lack of Transportation (Medical): No    Lack of Transportation (Non-Medical): No   Physical Activity: Sufficiently Active    Days of Exercise per Week: 5 days    Minutes of Exercise per Session: 60 min   Stress: No Stress Concern Present    Feeling of Stress : Not at all   Social Connections: Socially Integrated    Frequency of Communication with Friends and Family: More than three times a week    Frequency of Social Gatherings with Friends and Family: Once a week    Attends [employer] Services: More than 4 times per year    Active Member of Clubs or Organizations: Yes    Attends Club or Organization Meetings: More than 4 times per year    Marital Status:    Housing Stability: Low Risk     Unable to  Pay for Housing in the Last Year: No    Number of Places Lived in the Last Year: 1    Unstable Housing in the Last Year: No     Patient Active Problem List   Diagnosis    Generalized anxiety disorder    History of diabetic retinopathy Mild nonproliferative diabetic retinopathy of both eyes (None on eye exam 11/26/2018)    Mixed stress and urge urinary incontinence    Controlled type 2 diabetes mellitus with complication, without long-term current use of insulin    Nuclear sclerosis of both eyes    Diabetes mellitus type 2 without retinopathy    Hyperlipidemia associated with type 2 diabetes mellitus    Primary snoring    Nocturnal hypoxemia    Major depressive disorder, recurrent episode, moderate     Review of patient's allergies indicates:   Allergen Reactions    Hydrocodone Nausea And Vomiting     Once only - on empty stomach       The following were reviewed at this visit: active problem list, medication list, allergies, family history, social history, and health maintenance.    Medications:  Current Outpatient Medications on File Prior to Visit   Medication Sig Dispense Refill    aspirin (ECOTRIN) 81 MG EC tablet Take 81 mg by mouth once daily.      blood sugar diagnostic (BLOOD GLUCOSE TEST) Strp 1 each by Misc.(Non-Drug; Combo Route) route once daily. One touch verio  each 4    blood-glucose meter kit Use as instructed 1 each 0    buPROPion (WELLBUTRIN XL) 150 MG TB24 tablet Take 1 tablet (150 mg total) by mouth once daily. 90 tablet 3    cholecalciferol, vitamin D3, (VITAMIN D3) 25 mcg (1,000 unit) capsule Take 1,000 Units by mouth once daily.      clonazePAM (KLONOPIN) 0.5 MG tablet 1/2 to 1 up to BID prn severe anxiety 30 tablet 0    cyanocobalamin (VITAMIN B-12) 1000 MCG tablet Take 100 mcg by mouth once daily. Taking a B-complex      famotidine (PEPCID) 40 MG tablet Take 1 tablet (40 mg total) by mouth once daily. 90 tablet 3    fish oil-omega-3 fatty acids 300-1,000 mg  capsule Take 2 g by mouth once daily.      lancets (ONE TOUCH DELICA LANCETS) 33 gauge Misc 1 lancet by Misc.(Non-Drug; Combo Route) route 2 (two) times daily. 200 each 3    lysine 500 mg Tab Take 500 mg by mouth once daily.      metFORMIN (GLUCOPHAGE-XR) 500 MG ER 24hr tablet Two tablets  tablet 3    multivitamin capsule Take 1 capsule by mouth once daily.      omeprazole (PRILOSEC) 20 MG capsule Take 20 mg by mouth once daily.      oxybutynin (DITROPAN) 5 MG Tab Take 1 tablet (5 mg total) by mouth 2 (two) times daily. 180 tablet 3    paroxetine (PAXIL) 30 MG tablet Take 1 tablet (30 mg total) by mouth every morning. 90 tablet 1    PHENAZOPYRIDINE HCL (AZO ORAL) Take 1 tablet by mouth daily as needed.       pravastatin (PRAVACHOL) 40 MG tablet TAKE 1 TABLET EVERY DAY 90 tablet 3    traZODone (DESYREL) 50 MG tablet Take 1 tablet (50 mg total) by mouth every evening. 90 tablet 3     No current facility-administered medications on file prior to visit.       Medications have been reviewed and reconciled with patient at this visit.  Barriers to medications reviewed with patient.    Adverse reactions to current medications reviewed with patient..    Over the counter medications reviewed and reconciled with patient.    Exam:  Wt Readings from Last 3 Encounters:   03/02/22 62.5 kg (137 lb 14.4 oz)   12/15/21 64 kg (141 lb 1.5 oz)   11/15/21 64 kg (141 lb 1.5 oz)     Temp Readings from Last 3 Encounters:   03/02/22 99 °F (37.2 °C) (Tympanic)   11/15/21 96.4 °F (35.8 °C) (Tympanic)   08/25/21 97.7 °F (36.5 °C)     BP Readings from Last 3 Encounters:   03/02/22 130/72   11/15/21 122/70   08/25/21 124/62     Pulse Readings from Last 3 Encounters:   03/02/22 108   11/15/21 93   08/25/21 101     Body mass index is 22.95 kg/m².      Review of Systems   Constitutional: Negative.  Negative for chills and fever.   HENT: Negative.  Negative for congestion, sinus pain and sore throat.    Eyes: Negative for blurred  vision and double vision.   Respiratory: Negative for cough, sputum production, shortness of breath and wheezing.    Cardiovascular: Negative for chest pain, palpitations and leg swelling.   Gastrointestinal: Negative for abdominal pain, constipation, diarrhea, heartburn, nausea and vomiting.   Genitourinary: Negative.    Musculoskeletal: Negative.    Skin: Negative.  Negative for rash.   Neurological: Negative.    Endo/Heme/Allergies: Negative.  Negative for polydipsia. Does not bruise/bleed easily.   Psychiatric/Behavioral: Positive for memory loss. Negative for depression and substance abuse.     Physical Exam  Vitals and nursing note reviewed.   Constitutional:       General: She is not in acute distress.     Appearance: She is well-developed. She is not diaphoretic.   HENT:      Head: Normocephalic and atraumatic.      Right Ear: External ear normal.      Left Ear: External ear normal.      Nose: Nose normal.   Eyes:      General:         Right eye: No discharge.         Left eye: No discharge.      Conjunctiva/sclera: Conjunctivae normal.      Pupils: Pupils are equal, round, and reactive to light.   Neck:      Thyroid: No thyromegaly.   Cardiovascular:      Rate and Rhythm: Normal rate and regular rhythm.      Pulses: Normal pulses.      Heart sounds: Normal heart sounds. No murmur heard.  Pulmonary:      Effort: Pulmonary effort is normal. No respiratory distress.      Breath sounds: Normal breath sounds. No wheezing.   Abdominal:      General: Bowel sounds are normal.   Musculoskeletal:         General: Normal range of motion.      Cervical back: Normal range of motion and neck supple.   Lymphadenopathy:      Cervical: No cervical adenopathy.   Skin:     General: Skin is warm and dry.      Capillary Refill: Capillary refill takes less than 2 seconds.   Neurological:      Mental Status: She is alert and oriented to person, place, and time.      Cranial Nerves: No cranial nerve deficit.   Psychiatric:          Behavior: Behavior normal.         Thought Content: Thought content normal.         Judgment: Judgment normal.         Laboratory Reviewed ({Yes)  Lab Results   Component Value Date    WBC 7.91 08/18/2020    HGB 13.4 08/18/2020    HCT 41.5 08/18/2020     08/18/2020    CHOL 130 08/18/2021    TRIG 74 08/18/2021    HDL 54 08/18/2021    ALT 20 08/18/2021    AST 18 08/18/2021     08/18/2021    K 4.0 08/18/2021     08/18/2021    CREATININE 0.7 08/18/2021    BUN 17 08/18/2021    CO2 32 (H) 08/18/2021    HGBA1C 5.6 02/23/2022       Connie was seen today for follow-up.    Diagnoses and all orders for this visit:    Controlled type 2 diabetes mellitus with complication, without long-term current use of insulin  -     CBC Auto Differential; Future  -     Comprehensive Metabolic Panel; Future  -     Microalbumin/Creatinine Ratio, Urine; Future  -     Hemoglobin A1C; Future    Hyperlipidemia associated with type 2 diabetes mellitus  -     Lipid Panel; Future  -     Hemoglobin A1C; Future    Major depressive disorder, recurrent episode, moderate   Currently stable. Will cont with meds    Colon cancer screening  -     Case Request Endoscopy: COLONOSCOPY    History of colon polyps  -     Case Request Endoscopy: COLONOSCOPY    Memory impairment   She is taking a medicine to help with her memory     6m appt with Dr. Erwin  Will get labs one week prior to appt       Care Plan/Goals: Reviewed    Goals    None         Follow up: No follow-ups on file.    After visit summary was printed and given to patient upon discharge today.  Patient goals and care plan are included in After Visit Summary.       0 (no pain/absence of nonverbal indicators of pain)

## 2025-01-03 ENCOUNTER — TELEPHONE (OUTPATIENT)
Dept: INTERNAL MEDICINE | Facility: CLINIC | Age: 80
End: 2025-01-03
Payer: MEDICARE

## 2025-01-03 NOTE — TELEPHONE ENCOUNTER
----- Message from Lauren sent at 1/3/2025 11:15 AM CST -----  Contact: [phone]  Would like to receive medical advice.    Pt called with questions about insurance and medications.     Would they like a call back or a response via [facility]:  call    Additional information:  Please call to advise.

## 2025-01-04 DIAGNOSIS — E11.69 HYPERLIPIDEMIA ASSOCIATED WITH TYPE 2 DIABETES MELLITUS: ICD-10-CM

## 2025-01-04 DIAGNOSIS — K21.9 GASTROESOPHAGEAL REFLUX DISEASE, UNSPECIFIED WHETHER ESOPHAGITIS PRESENT: ICD-10-CM

## 2025-01-04 DIAGNOSIS — E78.5 HYPERLIPIDEMIA ASSOCIATED WITH TYPE 2 DIABETES MELLITUS: ICD-10-CM

## 2025-01-04 DIAGNOSIS — E78.2 MIXED HYPERLIPIDEMIA: ICD-10-CM

## 2025-01-04 NOTE — TELEPHONE ENCOUNTER
No care due was identified.  [facility] Embedded Care Due Messages. Reference number: [id number].   1/04/2025 10:20:42 AM CST

## 2025-01-05 DIAGNOSIS — E78.5 HYPERLIPIDEMIA ASSOCIATED WITH TYPE 2 DIABETES MELLITUS: ICD-10-CM

## 2025-01-05 DIAGNOSIS — E78.2 MIXED HYPERLIPIDEMIA: ICD-10-CM

## 2025-01-05 DIAGNOSIS — K21.9 GASTROESOPHAGEAL REFLUX DISEASE, UNSPECIFIED WHETHER ESOPHAGITIS PRESENT: ICD-10-CM

## 2025-01-05 DIAGNOSIS — E11.69 HYPERLIPIDEMIA ASSOCIATED WITH TYPE 2 DIABETES MELLITUS: ICD-10-CM

## 2025-01-05 RX ORDER — PANTOPRAZOLE SODIUM 40 MG/1
40 TABLET, DELAYED RELEASE ORAL DAILY
Qty: 90 TABLET | Refills: 3 | Status: SHIPPED | OUTPATIENT
Start: 2025-01-05

## 2025-01-05 RX ORDER — METFORMIN HYDROCHLORIDE 500 MG/1
1000 TABLET, EXTENDED RELEASE ORAL 2 TIMES DAILY
Qty: 360 TABLET | Refills: 1 | Status: SHIPPED | OUTPATIENT
Start: 2025-01-05

## 2025-01-05 RX ORDER — PRAVASTATIN SODIUM 40 MG/1
40 TABLET ORAL DAILY
Qty: 90 TABLET | Refills: 3 | Status: SHIPPED | OUTPATIENT
Start: 2025-01-05

## 2025-01-05 RX ORDER — METFORMIN HYDROCHLORIDE 500 MG/1
TABLET, EXTENDED RELEASE ORAL
Qty: 30 TABLET | Refills: 0 | OUTPATIENT
Start: 2025-01-05

## 2025-01-05 RX ORDER — PRAVASTATIN SODIUM 40 MG/1
TABLET ORAL
Qty: 30 TABLET | Refills: 0 | OUTPATIENT
Start: 2025-01-05

## 2025-01-05 RX ORDER — PANTOPRAZOLE SODIUM 40 MG/1
TABLET, DELAYED RELEASE ORAL
Qty: 30 TABLET | Refills: 0 | OUTPATIENT
Start: 2025-01-05

## 2025-01-06 DIAGNOSIS — G47.00 INSOMNIA, UNSPECIFIED TYPE: ICD-10-CM

## 2025-01-06 RX ORDER — TRAZODONE HYDROCHLORIDE 50 MG/1
50 TABLET ORAL NIGHTLY
Qty: 90 TABLET | Refills: 1 | Status: SHIPPED | OUTPATIENT
Start: 2025-01-06

## 2025-01-06 NOTE — TELEPHONE ENCOUNTER
No care due was identified.  [facility] Embedded Care Due Messages. Reference number: [id number].   1/05/2025 6:47:35 PM CST

## 2025-01-06 NOTE — TELEPHONE ENCOUNTER
Refill Decision Note   Connie Fields  is requesting a refill authorization.  Brief Assessment and Rationale for Refill:  Approve     Medication Therapy Plan:         Comments:     Note composed:6:54 PM 01/05/2025             Appointments     Last Visit   11/14/2024 Loli Erwin MD   Next Visit   Visit date not found Loli Erwin MD

## 2025-01-06 NOTE — TELEPHONE ENCOUNTER
Refill Decision Note   Connie Fields  is requesting a refill authorization.  Brief Assessment and Rationale for Refill:        Medication Therapy Plan:  Pharmacy is requesting new scripts for the following medications without required information, (sig/ frequency/qty/etc)           Comments: Pharmacies have been requesting medications for patients without required information, (sig, frequency, qty, etc.). In addition, requests are sent for medication(s) pt. are currently not taking, and medications patients have never taken.    We have spoken to the pharmacies about these request types and advised their teams previously that we are unable to assess these New Script requests and require all details for these requests. This is a known issue and has been reported.      No Care Gaps recommended.     Note composed:6:13 PM 01/05/2025

## 2025-01-06 NOTE — TELEPHONE ENCOUNTER
No care due was identified.  [facility] Embedded Care Due Messages. Reference number: [id number].   1/06/2025 1:03:14 PM CST

## 2025-01-06 NOTE — TELEPHONE ENCOUNTER
----- Message from Radha sent at 1/6/2025 10:56 AM CST -----  Contact: GREGORY ANNA [[id number]]  Patient called in stating her refills were denied and needs them approved and sent to [facility] for home delivery     [facility] [address]  [address] [address]  Phone: [phone] Fax: [phone]    .Type:  RX Refill Request    Who Called:   Refill or New Rx:Refill   RX Name and Strength:traZODone (DESYREL) 50 MG tablet  How is the patient currently taking it? (ex. 1XDay):  Is this a 30 day or 90 day RX:90  Preferred Pharmacy with phone number:.    [facility] [id number] [address]  Phone: [phone] Fax: [phone]     Local or Mail Order:Mail  Ordering Provider:Rip  Would the patient rather a call back or a response via [facility]? Call back  Best Call Back Number:.[phone] (home)    Additional Information:

## 2025-01-17 ENCOUNTER — TELEPHONE (OUTPATIENT)
Dept: INTERNAL MEDICINE | Facility: CLINIC | Age: 80
End: 2025-01-17
Payer: MEDICARE

## 2025-01-17 NOTE — TELEPHONE ENCOUNTER
THis requires office visit, virtual is fine.  She currently has PAxil on med list, which is an antidepressant, is she taking.    Again, requires a visit, not a phone message

## 2025-01-17 NOTE — TELEPHONE ENCOUNTER
----- Message from [employer] sent at 1/17/2025 11:18 AM CST -----  Contact: GREGORY ANNA [[id number]]  ..Type:  Patient Requesting Call    Who Called:GREGORY ANNA [[id number]]  Does the patient know what this is regarding?:requesting a call from the nurse  Would the patient rather a call back or a response via [facility]? call  Best Call Back Number:[phone]  Additional Information: medication

## 2025-01-17 NOTE — TELEPHONE ENCOUNTER
She is not currently taking paxil or wellbutrin.  Can she see someone else or do you want to see her for this

## 2025-01-17 NOTE — TELEPHONE ENCOUNTER
Spoke with patient and she stated that she is having some depression problems at the moment  No life changes to cause this feeling  She is still currently taking clonazepam for the anxiety but nothing for the depression.   Please advise.

## 2025-01-24 ENCOUNTER — OFFICE VISIT (OUTPATIENT)
Dept: INTERNAL MEDICINE | Facility: CLINIC | Age: 80
End: 2025-01-24
Payer: MEDICARE

## 2025-01-24 VITALS
HEIGHT: 65 IN | WEIGHT: 146.38 LBS | SYSTOLIC BLOOD PRESSURE: 135 MMHG | HEART RATE: 90 BPM | OXYGEN SATURATION: 96 % | DIASTOLIC BLOOD PRESSURE: 78 MMHG | BODY MASS INDEX: 24.39 KG/M2

## 2025-01-24 DIAGNOSIS — F43.0 STRESS REACTION: ICD-10-CM

## 2025-01-24 DIAGNOSIS — F33.1 MAJOR DEPRESSIVE DISORDER, RECURRENT EPISODE, MODERATE: Primary | ICD-10-CM

## 2025-01-24 DIAGNOSIS — F41.1 GENERALIZED ANXIETY DISORDER: ICD-10-CM

## 2025-01-24 PROCEDURE — 3288F FALL RISK ASSESSMENT DOCD: CPT | Mod: CPTII,S$GLB,, | Performed by: PHYSICIAN ASSISTANT

## 2025-01-24 PROCEDURE — 99214 OFFICE O/P EST MOD 30 MIN: CPT | Mod: S$GLB,,, | Performed by: PHYSICIAN ASSISTANT

## 2025-01-24 PROCEDURE — 1101F PT FALLS ASSESS-DOCD LE1/YR: CPT | Mod: CPTII,S$GLB,, | Performed by: PHYSICIAN ASSISTANT

## 2025-01-24 PROCEDURE — 3072F LOW RISK FOR RETINOPATHY: CPT | Mod: CPTII,S$GLB,, | Performed by: PHYSICIAN ASSISTANT

## 2025-01-24 PROCEDURE — 3075F SYST BP GE 130 - 139MM HG: CPT | Mod: CPTII,S$GLB,, | Performed by: PHYSICIAN ASSISTANT

## 2025-01-24 PROCEDURE — 99999 PR PBB SHADOW E&M-EST. PATIENT-LVL IV: CPT | Mod: PBBFAC,,, | Performed by: PHYSICIAN ASSISTANT

## 2025-01-24 PROCEDURE — 3078F DIAST BP <80 MM HG: CPT | Mod: CPTII,S$GLB,, | Performed by: PHYSICIAN ASSISTANT

## 2025-01-24 PROCEDURE — 1126F AMNT PAIN NOTED NONE PRSNT: CPT | Mod: CPTII,S$GLB,, | Performed by: PHYSICIAN ASSISTANT

## 2025-01-24 PROCEDURE — 1159F MED LIST DOCD IN RCRD: CPT | Mod: CPTII,S$GLB,, | Performed by: PHYSICIAN ASSISTANT

## 2025-01-24 RX ORDER — BUPROPION HYDROCHLORIDE 150 MG/1
150 TABLET ORAL DAILY
Qty: 90 TABLET | Refills: 0 | Status: SHIPPED | OUTPATIENT
Start: 2025-01-24 | End: 2025-04-24

## 2025-02-22 DIAGNOSIS — Z00.00 ENCOUNTER FOR MEDICARE ANNUAL WELLNESS EXAM: ICD-10-CM

## 2025-02-26 ENCOUNTER — OFFICE VISIT (OUTPATIENT)
Dept: INTERNAL MEDICINE | Facility: CLINIC | Age: 80
End: 2025-02-26
Payer: MEDICARE

## 2025-02-26 VITALS
SYSTOLIC BLOOD PRESSURE: 132 MMHG | WEIGHT: 145.63 LBS | OXYGEN SATURATION: 95 % | BODY MASS INDEX: 24.26 KG/M2 | DIASTOLIC BLOOD PRESSURE: 74 MMHG | HEART RATE: 86 BPM | HEIGHT: 65 IN

## 2025-02-26 DIAGNOSIS — F41.1 GENERALIZED ANXIETY DISORDER: Primary | ICD-10-CM

## 2025-02-26 DIAGNOSIS — R00.0 TACHYCARDIA: ICD-10-CM

## 2025-02-26 DIAGNOSIS — F51.01 PRIMARY INSOMNIA: ICD-10-CM

## 2025-02-26 DIAGNOSIS — R19.7 DIARRHEA, UNSPECIFIED TYPE: ICD-10-CM

## 2025-02-26 DIAGNOSIS — F33.1 MAJOR DEPRESSIVE DISORDER, RECURRENT EPISODE, MODERATE: ICD-10-CM

## 2025-02-26 DIAGNOSIS — G47.00 INSOMNIA, UNSPECIFIED TYPE: ICD-10-CM

## 2025-02-26 PROCEDURE — 1101F PT FALLS ASSESS-DOCD LE1/YR: CPT | Mod: CPTII,S$GLB,, | Performed by: PHYSICIAN ASSISTANT

## 2025-02-26 PROCEDURE — 1159F MED LIST DOCD IN RCRD: CPT | Mod: CPTII,S$GLB,, | Performed by: PHYSICIAN ASSISTANT

## 2025-02-26 PROCEDURE — 3078F DIAST BP <80 MM HG: CPT | Mod: CPTII,S$GLB,, | Performed by: PHYSICIAN ASSISTANT

## 2025-02-26 PROCEDURE — 1160F RVW MEDS BY RX/DR IN RCRD: CPT | Mod: CPTII,S$GLB,, | Performed by: PHYSICIAN ASSISTANT

## 2025-02-26 PROCEDURE — G2211 COMPLEX E/M VISIT ADD ON: HCPCS | Mod: S$GLB,,, | Performed by: PHYSICIAN ASSISTANT

## 2025-02-26 PROCEDURE — 99214 OFFICE O/P EST MOD 30 MIN: CPT | Mod: S$GLB,,, | Performed by: PHYSICIAN ASSISTANT

## 2025-02-26 PROCEDURE — 3288F FALL RISK ASSESSMENT DOCD: CPT | Mod: CPTII,S$GLB,, | Performed by: PHYSICIAN ASSISTANT

## 2025-02-26 PROCEDURE — 1126F AMNT PAIN NOTED NONE PRSNT: CPT | Mod: CPTII,S$GLB,, | Performed by: PHYSICIAN ASSISTANT

## 2025-02-26 PROCEDURE — 3072F LOW RISK FOR RETINOPATHY: CPT | Mod: CPTII,S$GLB,, | Performed by: PHYSICIAN ASSISTANT

## 2025-02-26 PROCEDURE — 99999 PR PBB SHADOW E&M-EST. PATIENT-LVL V: CPT | Mod: PBBFAC,,, | Performed by: PHYSICIAN ASSISTANT

## 2025-02-26 PROCEDURE — 3075F SYST BP GE 130 - 139MM HG: CPT | Mod: CPTII,S$GLB,, | Performed by: PHYSICIAN ASSISTANT

## 2025-02-26 RX ORDER — BUPROPION HYDROCHLORIDE 300 MG/1
300 TABLET ORAL DAILY
Qty: 30 TABLET | Refills: 11 | Status: SHIPPED | OUTPATIENT
Start: 2025-02-26 | End: 2026-02-26

## 2025-02-26 RX ORDER — TRAZODONE HYDROCHLORIDE 50 MG/1
100 TABLET ORAL NIGHTLY
Qty: 90 TABLET | Refills: 1 | Status: SHIPPED | OUTPATIENT
Start: 2025-02-26

## 2025-02-26 NOTE — PROGRESS NOTES
Subjective:       Patient ID: Connie Fields is a 79 y.o. female.    CHIEF COMPLAINT:  - Connie presents for follow-up of depression and anxiety, with concerns about heart rate irregularities and chronic diarrhea.    HPI:  - Connie reports improvement in well-being but states she is not fully recovered regarding her depression and anxiety. She describes ongoing symptoms of depression, including lack of motivation to get up at times. Her depression has worsened since her son's death. Her  has observed that she is constantly anxious and advises her to reduce her pace.  - She reports episodes of elevated heart rate, even while sitting still, with rates increasing to 120-130 BPM, and sometimes up to 140 BPM. She has not previously been evaluated by a cardiologist for this issue.  - She describes chronic diarrhea for the past 2-3 months, occurring unpredictably and immediately after eating, regardless of food type. The episodes can occur in various locations, prompting her to carry a pad at all times. She was evaluated by a gastroenterologist, Dr. Robert Gregory, in October for this issue. Dr. Gregory recommended a colonoscopy, which she is reluctant to undergo as she had one 2 years ago with no polyps found. She uses Imodium to manage her symptoms, typically taking 2 doses when an episode occurs, which provides relief for the rest of the day.  - She reports difficulty sleeping and has increased her dosage of Trazodone from 1-2 tablets before bed over the last week, along with melatonin. This combination has improved her sleep. She denies feeling palpitations.    MEDICATIONS:  - Wellbutrin 150 mg, daily in the morning, for depression  - Trazodone, 2 tablets at bedtime, for sleep  - Melatonin, at bedtime, for sleep  - Imodium, 2 tablets as needed, for diarrhea  - Wellbutrin increased from 150 mg to 300 mg daily by provider in current visit  - Trazodone increased from 1 tablet to 2 tablets at bedtime by  patient in the last week    MEDICAL HISTORY:  - Depression: Since before son's death, worsened after son's death  - Anxiety  - Possible atrial fibrillation (AFib)  - Possible irritable bowel syndrome or colitis    FAMILY HISTORY:  - Son:     SURGICAL HISTORY:  - Colonoscopy: Approximately 2 years ago, no polyps found    TEST RESULTS:  - Stool studies: October    IMAGING:  - Colonoscopy: Approximately 2 years ago, no polyps found    SOCIAL HISTORY:  - Marital status:          Review of Systems   Constitutional:  Negative for chills, fatigue, fever and unexpected weight change.   HENT:  Negative for congestion, dental problem, ear pain, hearing loss, rhinorrhea and trouble swallowing.    Eyes:  Negative for pain and visual disturbance.   Respiratory:  Negative for cough and shortness of breath.    Cardiovascular:  Positive for palpitations (intermittent). Negative for chest pain and leg swelling.   Gastrointestinal:  Positive for diarrhea. Negative for abdominal distention, abdominal pain, blood in stool, constipation, nausea and vomiting.   Genitourinary:  Negative for difficulty urinating and pelvic pain.   Musculoskeletal:  Negative for arthralgias and myalgias.   Skin:  Negative for rash.   Neurological:  Negative for dizziness, weakness, numbness and headaches.   Hematological:  Negative for adenopathy. Does not bruise/bleed easily.   Psychiatric/Behavioral:  Positive for dysphoric mood and sleep disturbance. The patient is nervous/anxious.        Objective:      Physical Exam  Vitals reviewed.   Constitutional:       General: She is not in acute distress.     Appearance: Normal appearance. She is well-developed and normal weight. She is not ill-appearing or toxic-appearing.   HENT:      Head: Normocephalic and atraumatic.   Eyes:      General: Lids are normal. No scleral icterus.     Extraocular Movements: Extraocular movements intact.      Conjunctiva/sclera: Conjunctivae normal.      Pupils:  Pupils are equal, round, and reactive to light.   Cardiovascular:      Rate and Rhythm: Normal rate and regular rhythm.      Heart sounds: Normal heart sounds. No murmur heard.     No friction rub. No gallop.   Pulmonary:      Effort: Pulmonary effort is normal.      Breath sounds: Normal breath sounds. No decreased breath sounds, wheezing, rhonchi or rales.   Abdominal:      General: Bowel sounds are normal. There is no distension.      Palpations: Abdomen is soft.      Tenderness: There is no abdominal tenderness. There is no guarding or rebound.   Neurological:      General: No focal deficit present.      Mental Status: She is alert and oriented to person, place, and time. Mental status is at baseline.      Cranial Nerves: No cranial nerve deficit.      Gait: Gait normal.   Psychiatric:         Mood and Affect: Mood and affect normal.         Behavior: Behavior normal.         Thought Content: Thought content normal.         Judgment: Judgment normal.         Assessment:       1. Generalized anxiety disorder    2. Major depressive disorder, recurrent episode, moderate    3. Insomnia, unspecified type    4. Primary insomnia    5. Diarrhea, unspecified type    6. Tachycardia        Plan:   1. Generalized anxiety disorder  Overview:  See ASHLYN RAY    Orders:  -     buPROPion (WELLBUTRIN XL) 300 MG 24 hr tablet; Take 1 tablet (300 mg total) by mouth once daily.  Dispense: 30 tablet; Refill: 11    2. Major depressive disorder, recurrent episode, moderate  Overview:  SeeASHLYN Alcala    Orders:  -     buPROPion (WELLBUTRIN XL) 300 MG 24 hr tablet; Take 1 tablet (300 mg total) by mouth once daily.  Dispense: 30 tablet; Refill: 11    3. Insomnia, unspecified type  -     traZODone (DESYREL) 50 MG tablet; Take 2 tablets (100 mg total) by mouth every evening.  Dispense: 90 tablet; Refill: 1    4. Primary insomnia    5. Diarrhea, unspecified type    6. Tachycardia  Comments:  patient reports intermittent tachycardia  into 120s-140s, denies symptoms but noticed it on her watch  Overview:  patient reports intermittent tachycardia into 120s-140s, denies symptoms but noticed it on her watch    Orders:  -     Ambulatory referral/consult to Cardiology; Future; Expected date: 03/05/2025        Assessment & Plan    IMPRESSION:  - Assessed patient's well-being trend, noting improvement but not full resolution  - Evaluated current medication regimen, deciding to increase Wellbutrin dosage  - Considered potential restart of Paxil next month depending on response to Wellbutrin increase  - Assessed reported elevated heart rate, suspecting possible atrial fibrillation (AFib) or other arrhythmia.   - Reviewed GI symptoms, noting previous evaluation by Dr. Robert Gregory    DEPRESSION:   Increased Wellbutrin dosage from 150mg to 300mg daily.   Noted improved well-being in the patient, but full recovery has not been achieved.   Observed ongoing depression, particularly since the patient's son's death.   Acknowledged improvement but recognized the need for further treatment.   Scheduled a follow-up visit in 1 month, with potential for further medication adjustments.   Noted the patient's mention of increased depression since son's death.   Considered this factor in the overall assessment and treatment plan for depression.    ANXIETY:   Discussed the relationship between anxiety and elevated heart rate with the patient.   Noted the patient's reports of experiencing anxiety.   Evaluated the patient's elevated heart rate, potentially due to anxiety.   Considered anxiety as a possible cause for elevated heart rate.  - Patient states she still has the PRN Klonopin prescribed by Dr. Faye.     POSSIBLE ARRHYTHMIAS OR ATRIAL FIBRILLATION:   Referred the patient to a cardiologist for further evaluation of elevated heart rate.   Explained atrial fibrillation (AFib) and its potential risks to the patient, including increased stroke risk due to blood clot  formation.   Noted the patient's report of elevated heart rate while at rest.   Measured current heart rate at 86, which is considered normal.   Referred the patient to cardiology for evaluation of INTERMITTENT tachycardia and possible atrial fibrillation.    DIARRHEA:   Continued over the counter Loperamide (Imodium) as needed for diarrhea management.   Noted the patient's report of experiencing frequent, unpredictable diarrhea for 2-3 months.   Evaluated that diarrhea is not consistent or related to specific foods.   Considered possible diagnoses including irritable bowel syndrome and colitis.   Acknowledged previous gastroenterologist consultation and tests performed.   follow-up with gastroenterologist Dr. Robert Gregory for ongoing diarrhea management and further evaluation of treatment options.    INSOMNIA:   Increased Trazodone dosage to 2 tablets at bedtime as needed for sleep.   Continued OTC melatonin as currently used.   Noted the patient's report of trouble sleeping.   Evaluated the effectiveness of current sleep medication regimen.    COLONOSCOPY:   Noted that the patient had a colonoscopy 2 years ago with no polyps found.   Acknowledged gastroenterologist's recommendation for another colonoscopy.   Discussed the patient's reluctance to undergo another colonoscopy.   Suggested discussing alternative options with the gastroenterologist.    FOLLOW UP:   Follow up in 1 month for in-person appointment to reassess medication efficacy and symptoms.   Follow up on May 7th as scheduled with Dr. Erwin.               This note was generated with the assistance of ambient listening technology. Verbal consent was obtained by the patient and accompanying visitor(s) for the recording of patient appointment to facilitate this note. I attest to having reviewed and edited the generated note for accuracy, though some syntax or spelling errors may persist. Please contact the author of this note for any  clarification.      Visit today included increased complexity associated with the care of the episodic problem T2DM, which was addressed while instituting co-management of the longitudinal care of the patient due to the serious and/or complex managed problem(s) .    I have evaluated and discussed management associated with medical care services that serve as the continuing focal point for all needed health care services and/or with medical care services that are part of ongoing care related to my patient's single, serious condition or a complex condition(s).    I am providing ongoing care and I am the primary care provider for this patient, and they are being managed, monitored, and/or observed for their chronic conditions over time.     I have addressed their ongoing health maintenance requirements and needs for all health care services and reviewed co-management plans provided by specialty providers when available.    Health Maintenance Due   Topic Date Due    TETANUS VACCINE  Never done    RSV Vaccine (Age 60+ and Pregnant patients) (1 - 1-dose 75+ series) Never done    Diabetic Eye Exam  05/23/2025

## 2025-03-17 DIAGNOSIS — Z00.00 ROUTINE HEALTH MAINTENANCE: ICD-10-CM

## 2025-03-17 DIAGNOSIS — R00.0 TACHYCARDIA: Primary | ICD-10-CM

## 2025-03-18 ENCOUNTER — OFFICE VISIT (OUTPATIENT)
Dept: CARDIOLOGY | Facility: CLINIC | Age: 80
End: 2025-03-18
Payer: MEDICARE

## 2025-03-18 ENCOUNTER — HOSPITAL ENCOUNTER (OUTPATIENT)
Dept: CARDIOLOGY | Facility: HOSPITAL | Age: 80
Discharge: HOME | End: 2025-03-18
Attending: INTERNAL MEDICINE
Payer: MEDICARE

## 2025-03-18 VITALS — WEIGHT: 143.06 LBS | BODY MASS INDEX: 23.81 KG/M2

## 2025-03-18 VITALS
DIASTOLIC BLOOD PRESSURE: 70 MMHG | HEART RATE: 81 BPM | WEIGHT: 143.31 LBS | SYSTOLIC BLOOD PRESSURE: 128 MMHG | BODY MASS INDEX: 23.85 KG/M2 | OXYGEN SATURATION: 95 %

## 2025-03-18 DIAGNOSIS — Z82.49 FAMILY HISTORY OF CARDIAC DISORDER: ICD-10-CM

## 2025-03-18 DIAGNOSIS — E11.8 CONTROLLED TYPE 2 DIABETES MELLITUS WITH COMPLICATION, WITHOUT LONG-TERM CURRENT USE OF INSULIN: ICD-10-CM

## 2025-03-18 DIAGNOSIS — E78.5 HYPERLIPIDEMIA ASSOCIATED WITH TYPE 2 DIABETES MELLITUS: ICD-10-CM

## 2025-03-18 DIAGNOSIS — Z00.00 ROUTINE HEALTH MAINTENANCE: ICD-10-CM

## 2025-03-18 DIAGNOSIS — R00.0 TACHYCARDIA: ICD-10-CM

## 2025-03-18 DIAGNOSIS — R94.31 ABNORMAL ECG: Primary | ICD-10-CM

## 2025-03-18 DIAGNOSIS — E11.69 HYPERLIPIDEMIA ASSOCIATED WITH TYPE 2 DIABETES MELLITUS: ICD-10-CM

## 2025-03-18 DIAGNOSIS — F41.1 GENERALIZED ANXIETY DISORDER: ICD-10-CM

## 2025-03-18 LAB
OHS QRS DURATION: 88 MS
OHS QTC CALCULATION: 422 MS

## 2025-03-18 PROCEDURE — 99205 OFFICE O/P NEW HI 60 MIN: CPT | Mod: S$GLB,,, | Performed by: INTERNAL MEDICINE

## 2025-03-18 PROCEDURE — 3078F DIAST BP <80 MM HG: CPT | Mod: CPTII,S$GLB,, | Performed by: INTERNAL MEDICINE

## 2025-03-18 PROCEDURE — 1160F RVW MEDS BY RX/DR IN RCRD: CPT | Mod: CPTII,S$GLB,, | Performed by: INTERNAL MEDICINE

## 2025-03-18 PROCEDURE — 1159F MED LIST DOCD IN RCRD: CPT | Mod: CPTII,S$GLB,, | Performed by: INTERNAL MEDICINE

## 2025-03-18 PROCEDURE — 3072F LOW RISK FOR RETINOPATHY: CPT | Mod: CPTII,S$GLB,, | Performed by: INTERNAL MEDICINE

## 2025-03-18 PROCEDURE — 3288F FALL RISK ASSESSMENT DOCD: CPT | Mod: CPTII,S$GLB,, | Performed by: INTERNAL MEDICINE

## 2025-03-18 PROCEDURE — 93005 ELECTROCARDIOGRAM TRACING: CPT

## 2025-03-18 PROCEDURE — 3074F SYST BP LT 130 MM HG: CPT | Mod: CPTII,S$GLB,, | Performed by: INTERNAL MEDICINE

## 2025-03-18 PROCEDURE — 1101F PT FALLS ASSESS-DOCD LE1/YR: CPT | Mod: CPTII,S$GLB,, | Performed by: INTERNAL MEDICINE

## 2025-03-18 PROCEDURE — 99999 PR PBB SHADOW E&M-EST. PATIENT-LVL IV: CPT | Mod: PBBFAC,,, | Performed by: INTERNAL MEDICINE

## 2025-03-18 PROCEDURE — 93010 ELECTROCARDIOGRAM REPORT: CPT | Mod: ,,, | Performed by: INTERNAL MEDICINE

## 2025-03-18 PROCEDURE — G2211 COMPLEX E/M VISIT ADD ON: HCPCS | Mod: S$GLB,,, | Performed by: INTERNAL MEDICINE

## 2025-03-18 NOTE — PROGRESS NOTES
Subjective:   Patient ID:  Connie Fields is a 79 y.o. female who presents for cardiac consult of No chief complaint on file.      Referral by: Loli Duarte Pa-c  [id number] [place] [address]     Reason for consult: tachycardia, palpitations       HPI  The patient came in today for cardiac consult of No chief complaint on file.    3/18/25  Connie Fields is a 79 y.o. female pt with HLD, DM2, nocturnal hypoxemia, snoring, KATHERINE, depression presents for CVD eval of tachycardia.     Per recent visit at PCP office - She reports episodes of elevated heart rate, even while sitting still, with rates increasing to 120-130 BPM, and sometimes up to 140 BPM. She has not previously been evaluated by a cardiologist for this issue.     BP and HR stable. 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     ECG - NSR, 1st deg AVB, LAD, poor RWP       FH - mother -  of MI, father - had CVD, brother  of MI       No results found for this or any previous visit.      No results found for this or any previous visit.      No results found for this or any previous visit.      No cardiac monitor results found for the past 12 months         Past Medical History:   Diagnosis Date    Anxiety     Anxiety     Cataract (lens) fragments in eye following cataract surgery, bilateral     DM (diabetes mellitus)      10/21/2018    DM (diabetes mellitus)      am 2019    DM2 (diabetes mellitus, type 2)     GERD (gastroesophageal reflux disease)     Hiatal hernia     Mild nonproliferative diabetic retinopathy of both eyes 1/22/15    OS>OD    Mild nonproliferative diabetic retinopathy of both eyes 2015    OS>OD     Osteoarthritis        Past Surgical History:   Procedure Laterality Date    AUGMENTATION OF BREAST      saline    BREAST SURGERY Bilateral 1992    saline implants     BUNIONECTOMY      CATARACT EXTRACTION W/  INTRAOCULAR LENS IMPLANT Bilateral     12/12/18 OD, 1/9/19 OS, Dr Higuera    CHOLECYSTECTOMY  05/12    COLONOSCOPY  08    1 POLYP    COLONOSCOPY N/A 12/6/2016    Procedure: COLONOSCOPY with biopsies;  Surgeon: Sarah Schmidt MD;  Location: [facility];  Service: Endoscopy;  Laterality: N/A;    COLONOSCOPY N/A 10/19/2022    Procedure: COLONOSCOPY;  Surgeon: Lindsey Gregory MD;  Location: [facility];  Service: Endoscopy;  Laterality: N/A;    ESOPHAGOGASTRODUODENOSCOPY N/A 1/31/2023    Procedure: EGD (ESOPHAGOGASTRODUODENOSCOPY);  Surgeon: Harvinder Head MD;  Location: [facility];  Service: Endoscopy;  Laterality: N/A;    PCIOL  Right 12/12/2018    DR. HIGUERA    TONSILLECTOMY, ADENOIDECTOMY         Social History[1]    Family History   Problem Relation Name Age of Onset    Heart failure Father      Suicide Father      Diabetes Mother      Heart failure Mother      Hypertension Mother      Stroke Mother      Heart failure Brother      Cancer Brother          full body    Hypertension Brother      Coronary artery disease Brother      Diabetes Maternal Grandmother      Hypertension Son      Suicide Son         Patient's Medications   New Prescriptions    No medications on file   Previous Medications    ASPIRIN (ECOTRIN) 81 MG EC TABLET    Take 81 mg by mouth once daily.    BLOOD SUGAR DIAGNOSTIC (BLOOD GLUCOSE TEST) STRP    1 each by Misc.(Non-Drug; Combo Route) route once daily. One touch verio IQ    BLOOD-GLUCOSE METER KIT    Use as instructed    BUPROPION (WELLBUTRIN XL) 300 MG 24 HR TABLET    Take 1 tablet (300 mg total) by mouth once daily.    CHOLECALCIFEROL, VITAMIN D3, (VITAMIN D3) 25 MCG (1,000 UNIT) CAPSULE    Take 1,000 Units by mouth once daily.    CLONAZEPAM (KLONOPIN) 0.5 MG TABLET    PRN severe anxiety.    CYANOCOBALAMIN (VITAMIN B-12) 1000 MCG TABLET    Take 100 mcg by mouth once daily. Taking a B-complex    FISH OIL-OMEGA-3 FATTY ACIDS 300-1,000  MG CAPSULE    Take 2 g by mouth once daily.    FLUTICASONE PROPIONATE (FLONASE) 50 MCG/ACTUATION NASAL SPRAY    1 spray in each nostril    LANCETS (ONE TOUCH DELICA LANCETS) 33 GAUGE MISC    1 lancet by Misc.(Non-Drug; Combo Route) route 2 (two) times daily.    LEVOCETIRIZINE (XYZAL) 5 MG TABLET    Take 1 tablet (5 mg total) by mouth every evening.    LYSINE 500 MG TAB    Take 500 mg by mouth once daily.    METFORMIN (GLUCOPHAGE-XR) 500 MG ER 24HR TABLET    Take 2 tablets (1,000 mg total) by mouth 2 (two) times daily.    MONTELUKAST (SINGULAIR) 10 MG TABLET    1 tablet in the evening    MULTIVITAMIN CAPSULE    Take 1 capsule by mouth once daily.    ONDANSETRON (ZOFRAN) 4 MG TABLET    Take 1 tablet (4 mg total) by mouth every 6 (six) hours as needed for Nausea.    OXYBUTYNIN (DITROPAN) 5 MG TAB    Take 1 tablet (5 mg total) by mouth 2 (two) times daily.    PANTOPRAZOLE (PROTONIX) 40 MG TABLET    Take 1 tablet (40 mg total) by mouth once daily.    PHENAZOPYRIDINE HCL (AZO ORAL)    Take 1 tablet by mouth daily as needed.     PRAVASTATIN (PRAVACHOL) 40 MG TABLET    Take 1 tablet (40 mg total) by mouth once daily.    TRAZODONE (DESYREL) 50 MG TABLET    Take 2 tablets (100 mg total) by mouth every evening.   Modified Medications    No medications on file   Discontinued Medications    No medications on file       Review of Systems   Constitutional:  Positive for malaise/fatigue.   HENT: Negative.     Eyes: Negative.    Respiratory: Negative.     Cardiovascular:  Positive for palpitations.   Gastrointestinal: Negative.    Genitourinary: Negative.    Musculoskeletal: Negative.    Skin: Negative.    Neurological: Negative.    Endo/Heme/Allergies: Negative.    Psychiatric/Behavioral: Negative.     All 12 systems otherwise negative.      Wt Readings from Last 3 Encounters:   03/18/25 65 kg (143 lb 4.8 oz)   03/18/25 64.9 kg (143 lb 1.3 oz)   02/26/25 66 kg (145 lb 9.8 oz)     Temp Readings from Last 3 Encounters:   11/14/24  96.8 °F (36 °C) (Tympanic)   05/09/24 97.2 °F (36.2 °C)   10/18/23 98.6 °F (37 °C) (Tympanic)     BP Readings from Last 3 Encounters:   03/18/25 128/70   02/26/25 132/74   01/24/25 135/78     Pulse Readings from Last 3 Encounters:   03/18/25 81   02/26/25 86   01/24/25 90       /70 (BP Location: Right arm, Patient Position: Sitting)   Pulse 81   Wt 65 kg (143 lb 4.8 oz)   SpO2 95%   BMI 23.85 kg/m²     Objective:   Physical Exam  Vitals and nursing note reviewed.   Constitutional:       General: She is not in acute distress.     Appearance: She is well-developed. She is not diaphoretic.   HENT:      Head: Normocephalic and atraumatic.      Nose: Nose normal.   Eyes:      General: No scleral icterus.     Conjunctiva/sclera: Conjunctivae normal.   Neck:      Thyroid: No thyromegaly.      Vascular: No JVD.   Cardiovascular:      Rate and Rhythm: Normal rate and regular rhythm.      Heart sounds: S1 normal and S2 normal. No murmur heard.     No friction rub. No gallop. No S3 or S4 sounds.   Pulmonary:      Effort: Pulmonary effort is normal. No respiratory distress.      Breath sounds: Normal breath sounds. No stridor. No wheezing or rales.   Chest:      Chest wall: No tenderness.   Abdominal:      General: Bowel sounds are normal. There is no distension.      Palpations: Abdomen is soft. There is no mass.      Tenderness: There is no abdominal tenderness. There is no rebound.   Genitourinary:     Comments: Deferred  Musculoskeletal:         General: No tenderness or deformity. Normal range of motion.      Cervical back: Normal range of motion and neck supple.   Lymphadenopathy:      Cervical: No cervical adenopathy.   Skin:     General: Skin is warm and dry.      Coloration: Skin is not pale.      Findings: No erythema or rash.   Neurological:      Mental Status: She is alert and oriented to person, place, and time.      Motor: No abnormal muscle tone.      Coordination: Coordination normal.   Psychiatric:     "     Behavior: Behavior normal.         Thought Content: Thought content normal.         Judgment: Judgment normal.         Lab Results   Component Value Date     11/11/2024    K 4.1 11/11/2024     11/11/2024    CO2 28 11/11/2024    BUN 14 11/11/2024    CREATININE 0.7 11/11/2024    GLU 92 11/11/2024    HGBA1C 5.6 11/11/2024    AST 20 11/11/2024    ALT 16 11/11/2024    ALBUMIN 3.9 11/11/2024    PROT 6.4 11/11/2024    BILITOT 0.9 11/11/2024    WBC 5.60 08/19/2022    HGB 13.4 08/19/2022    HCT 39.9 08/19/2022    MCV 85 08/19/2022     08/19/2022    CHOL 148 11/11/2024    HDL 51 11/11/2024    LDLCALC 78.0 11/11/2024    TRIG 95 11/11/2024         No results found for: "BNP", "INR"       Assessment:      1. Abnormal ECG    2. Tachycardia    3. Generalized anxiety disorder    4. Controlled type 2 diabetes mellitus with complication, without long-term current use of insulin    5. Hyperlipidemia associated with type 2 diabetes mellitus    6. Family history of cardiac disorder        Plan:   Tachycardia ,; FH CVD , palpitations   - order exercise nuclear stress test and ECHO  - order 7 day vital   - will discuss further workup     2. HLD  - cont statin    3. KATHERINE, depression  - cont tx per PCP    4. DM2  - cont tx - on Metformin    5. GERD  - cont PPI    Visit today included increased complexity associated with the care of the episodic problem tachycardia addressed and managing the longitudinal care of the patient due to the serious and/or complex managed problem(s) .      Thank you for allowing me to participate in this patient's care. Please do not hesitate to contact me with any questions or concerns. Consult note has been forwarded to the referral physician.          [1]   Social History  Tobacco Use    Smoking status: Never     Passive exposure: Never    Smokeless tobacco: Never   Substance Use Topics    Alcohol use: Yes     Comment: occasionally    Drug use: No     "

## 2025-03-26 ENCOUNTER — OFFICE VISIT (OUTPATIENT)
Dept: INTERNAL MEDICINE | Facility: CLINIC | Age: 80
End: 2025-03-26
Payer: MEDICARE

## 2025-03-26 VITALS
HEIGHT: 65 IN | BODY MASS INDEX: 23.32 KG/M2 | WEIGHT: 140 LBS | SYSTOLIC BLOOD PRESSURE: 133 MMHG | HEART RATE: 103 BPM | OXYGEN SATURATION: 96 % | DIASTOLIC BLOOD PRESSURE: 70 MMHG

## 2025-03-26 DIAGNOSIS — F41.1 GENERALIZED ANXIETY DISORDER: Primary | ICD-10-CM

## 2025-03-26 DIAGNOSIS — F33.1 MAJOR DEPRESSIVE DISORDER, RECURRENT EPISODE, MODERATE: ICD-10-CM

## 2025-03-26 DIAGNOSIS — F51.01 PRIMARY INSOMNIA: ICD-10-CM

## 2025-03-26 PROCEDURE — 3075F SYST BP GE 130 - 139MM HG: CPT | Mod: CPTII,S$GLB,, | Performed by: PHYSICIAN ASSISTANT

## 2025-03-26 PROCEDURE — 1126F AMNT PAIN NOTED NONE PRSNT: CPT | Mod: CPTII,S$GLB,, | Performed by: PHYSICIAN ASSISTANT

## 2025-03-26 PROCEDURE — 1159F MED LIST DOCD IN RCRD: CPT | Mod: CPTII,S$GLB,, | Performed by: PHYSICIAN ASSISTANT

## 2025-03-26 PROCEDURE — 99999 PR PBB SHADOW E&M-EST. PATIENT-LVL V: CPT | Mod: PBBFAC,,, | Performed by: PHYSICIAN ASSISTANT

## 2025-03-26 PROCEDURE — 3078F DIAST BP <80 MM HG: CPT | Mod: CPTII,S$GLB,, | Performed by: PHYSICIAN ASSISTANT

## 2025-03-26 PROCEDURE — 99214 OFFICE O/P EST MOD 30 MIN: CPT | Mod: S$GLB,,, | Performed by: PHYSICIAN ASSISTANT

## 2025-03-26 PROCEDURE — 3288F FALL RISK ASSESSMENT DOCD: CPT | Mod: CPTII,S$GLB,, | Performed by: PHYSICIAN ASSISTANT

## 2025-03-26 PROCEDURE — 3072F LOW RISK FOR RETINOPATHY: CPT | Mod: CPTII,S$GLB,, | Performed by: PHYSICIAN ASSISTANT

## 2025-03-26 PROCEDURE — 1160F RVW MEDS BY RX/DR IN RCRD: CPT | Mod: CPTII,S$GLB,, | Performed by: PHYSICIAN ASSISTANT

## 2025-03-26 PROCEDURE — 1101F PT FALLS ASSESS-DOCD LE1/YR: CPT | Mod: CPTII,S$GLB,, | Performed by: PHYSICIAN ASSISTANT

## 2025-03-26 PROCEDURE — G2211 COMPLEX E/M VISIT ADD ON: HCPCS | Mod: S$GLB,,, | Performed by: PHYSICIAN ASSISTANT

## 2025-03-26 RX ORDER — BUPROPION HYDROCHLORIDE 300 MG/1
300 TABLET ORAL DAILY
Qty: 90 TABLET | Refills: 3 | Status: SHIPPED | OUTPATIENT
Start: 2025-03-26 | End: 2026-03-26

## 2025-03-26 NOTE — PATIENT INSTRUCTIONS
You can get your RSV vaccine at the pharmacy.      It's time to update your Tetanus vaccination. You can do this at any pharmacy and it's good for 10 years!

## 2025-03-26 NOTE — PROGRESS NOTES
Subjective:       Patient ID: Connie Fields is a 79 y.o. female.    CHIEF COMPLAINT:  - Connie presents for a follow-up visit to assess the effectiveness of medication adjustments made a month ago, particularly focusing on depression and anxiety management.    HPI:  - Connie reports a decrease in heart rate since the last visit, though it remains elevated. Sleep has improved, attributed to the increased dose of trazodone. She now sleeps approximately 8-10 hours per night, typically going to bed around 9:00-9:30 PM.  - Regarding depression and anxiety, she reports improvement in symptoms since increasing the Wellbutrin dose a month ago. She has fatigue, particularly in the afternoons, and is uncertain if this is related to doubling the Wellbutrin dose.  - Her current sleep regimen includes trazodone, melatonin, and 2 Tylenol PM tablets. She acknowledges watching TV before bed, which may affect sleep quality.  - She bowls 3 times per week, indicating some level of physical activity.  - She denies morning fatigue and difficulty falling asleep.    MEDICATIONS:  - Trazodone, 2 pills, at bedtime for sleep  - Melatonin, 3 x 10 mg, at bedtime for sleep  - Wellbutrin, 300 mg daily, for depression/anxiety, patient reports feeling tired in the afternoon  - Trazodone, increased dose, helping with sleep  - Wellbutrin, increased to 300 mg daily 1 month ago for depression/anxiety  - Discontinued Tylenol PM (2 pills nightly) for sleep, as per provider's recommendation during this visit    MEDICAL HISTORY:  - Depression  - Anxiety  - Diabetes    IMAGING:  - Echocardiogram: scheduled for next month  - Stress test: scheduled for next month  - Heart monitor: scheduled for next month, to be worn for a week         Review of Systems   Constitutional:  Negative for chills, fatigue, fever and unexpected weight change.   HENT:  Negative for congestion, dental problem, ear pain, hearing loss, rhinorrhea and trouble swallowing.     Eyes:  Negative for pain and visual disturbance.   Respiratory:  Negative for cough and shortness of breath.    Cardiovascular:  Negative for chest pain, palpitations and leg swelling.   Gastrointestinal:  Negative for abdominal distention, abdominal pain, blood in stool, constipation, diarrhea, nausea and vomiting.   Genitourinary:  Negative for difficulty urinating and pelvic pain.   Musculoskeletal:  Negative for arthralgias and myalgias.   Skin:  Negative for rash.   Neurological:  Negative for dizziness, weakness, numbness and headaches.   Hematological:  Negative for adenopathy. Does not bruise/bleed easily.   Psychiatric/Behavioral:  Positive for sleep disturbance (improving). Negative for dysphoric mood (improving). The patient is not nervous/anxious (improving).        Objective:      Physical Exam  Vitals reviewed.   Constitutional:       General: She is not in acute distress.     Appearance: Normal appearance. She is well-developed and normal weight. She is not ill-appearing or toxic-appearing.   HENT:      Head: Normocephalic and atraumatic.   Eyes:      General: Lids are normal. No scleral icterus.     Extraocular Movements: Extraocular movements intact.      Conjunctiva/sclera: Conjunctivae normal.      Pupils: Pupils are equal, round, and reactive to light.   Cardiovascular:      Rate and Rhythm: Normal rate.      Heart sounds: No murmur heard.     No friction rub. No gallop.   Pulmonary:      Effort: Pulmonary effort is normal. No respiratory distress.      Breath sounds: No stridor. No decreased breath sounds, wheezing, rhonchi or rales.   Neurological:      General: No focal deficit present.      Mental Status: She is alert and oriented to person, place, and time. Mental status is at baseline.      Cranial Nerves: No cranial nerve deficit.      Gait: Gait normal.   Psychiatric:         Mood and Affect: Mood and affect normal.         Behavior: Behavior normal.         Thought Content: Thought  content normal.         Judgment: Judgment normal.         Assessment:       1. Generalized anxiety disorder    2. Major depressive disorder, recurrent episode, moderate    3. Primary insomnia        Plan:   1. Generalized anxiety disorder  Overview:  See ASHLYN RAY    Orders:  -     buPROPion (WELLBUTRIN XL) 300 MG 24 hr tablet; Take 1 tablet (300 mg total) by mouth once daily.  Dispense: 90 tablet; Refill: 3    2. Major depressive disorder, recurrent episode, moderate  Overview:  Sees ASHLYN RAY    Orders:  -     buPROPion (WELLBUTRIN XL) 300 MG 24 hr tablet; Take 1 tablet (300 mg total) by mouth once daily.  Dispense: 90 tablet; Refill: 3    3. Primary insomnia        Assessment & Plan    IMPRESSION:  - Continued Wellbutrin at current dose, as previous dose was insufficient but patient reports some tiredness, with potential future adjustment (e.g. splitting dose) if fatigue persists. Changed prescription to 90-day supply.  - Maintained trazodone at current dose, as it appears to be helping with sleep.  - Recommend discontinuation of nightly Tylenol PM use due to concerns about long-term effects.    MAJOR DEPRESSIVE DISORDER:   Continue Wellbutrin at current dose, as previous dose was insufficient but the patient reports some fatigue.   Changed prescription to 90-day supply.   Consider potential future adjustment (e.g., splitting dose) if fatigue persists.   Schedule follow-up on May 7th to discuss potential Wellbutrin dosage adjustments.   Connie reports improvement in depression symptoms.   Acknowledge that the increased Wellbutrin dose from a month ago may need more time to balance.    ANXIETY DISORDER:   Note that the patient reports improvement in anxiety symptoms.    TYPE 2 DIABETES:   Remind the patient of the upcoming ophthalmological exam due to diabetes.    INSOMNIA:   Discuss the composition (acetaminophen + diphenhydramine) and potential long-term effects of Tylenol PM.   Recommend  discontinuation of nightly Tylenol PM use due to concerns about long-term effects.   Maintain trazodone at current dose, as it appears to be effective for sleep.   Advise the patient to contact the office if sleep issues persist or if experiencing significant afternoon fatigue.   Note that the patient reports improved sleep with increased trazodone dose, sleeping 8-10 hours per night.   Advise against watching television before bed and suggest alternative activities.   Review current sleep medication regimen: trazodone, melatonin, and Tylenol PM.   Recommend discontinuing Tylenol PM and adjusting sleep medication as necessary.   Explained the impact of blue light from screens on sleep quality.   Connie to discontinue watching TV before bed.   Recommend trying reading, knitting, cross-stitching, or puzzling before bed instead of screen time.   Connie to use blue light filtering glasses when watching TV before bed if necessary.    TACHYCARDIA:   Note that the patient reports decreased but still elevated heart rate.   Perform heart and lung exam.  in clinic.    Refer the patient to cardiologist Dr. Metzger for further evaluation of tachycardia.    GENERAL FOLLOW-UP AND EDUCATION:   Instruct the patient to use [employer] to message Dr. Erwin's staff about potential lab work before the upcoming appointment.   Explained RSV vaccine benefits   Educated on the importance of tetanus vaccination and its 10-year protection period.   -Follow up scheduled with Dr. Erwin in May.                   This note was generated with the assistance of ambient listening technology. Verbal consent was obtained by the patient and accompanying visitor(s) for the recording of patient appointment to facilitate this note. I attest to having reviewed and edited the generated note for accuracy, though some syntax or spelling errors may persist. Please contact the author of this note for any clarification.      Visit today included increased  complexity associated with the care of the episodic problem anxiety and depression, which was addressed while instituting co-management of the longitudinal care of the patient due to the serious and/or complex managed problem(s) .    I have evaluated and discussed management associated with medical care services that serve as the continuing focal point for all needed health care services and/or with medical care services that are part of ongoing care related to my patient's single, serious condition or a complex condition(s).    I am providing ongoing care and I am the primary care provider for this patient, and they are being managed, monitored, and/or observed for their chronic conditions over time.     I have addressed their ongoing health maintenance requirements and needs for all health care services and reviewed co-management plans provided by specialty providers when available.    Health Maintenance Due   Topic Date Due    TETANUS VACCINE  Never done    RSV Vaccine (Age 60+ and Pregnant patients) (1 - 1-dose 75+ series) Never done    Diabetic Eye Exam  05/23/2025

## 2025-04-21 ENCOUNTER — HOSPITAL ENCOUNTER (OUTPATIENT)
Dept: CARDIOLOGY | Facility: HOSPITAL | Age: 80
Discharge: HOME | End: 2025-04-21
Attending: INTERNAL MEDICINE
Payer: MEDICARE

## 2025-04-21 ENCOUNTER — HOSPITAL ENCOUNTER (OUTPATIENT)
Dept: RADIOLOGY | Facility: HOSPITAL | Age: 80
Discharge: HOME | End: 2025-04-21
Attending: INTERNAL MEDICINE
Payer: MEDICARE

## 2025-04-21 ENCOUNTER — HOSPITAL ENCOUNTER (OUTPATIENT)
Dept: PULMONOLOGY | Facility: HOSPITAL | Age: 80
Discharge: HOME | End: 2025-04-21
Attending: INTERNAL MEDICINE
Payer: MEDICARE

## 2025-04-21 VITALS
SYSTOLIC BLOOD PRESSURE: 144 MMHG | HEIGHT: 65 IN | HEART RATE: 75 BPM | HEIGHT: 65 IN | DIASTOLIC BLOOD PRESSURE: 82 MMHG | BODY MASS INDEX: 23.16 KG/M2 | WEIGHT: 139 LBS | WEIGHT: 139 LBS | BODY MASS INDEX: 23.16 KG/M2

## 2025-04-21 DIAGNOSIS — E78.5 HYPERLIPIDEMIA ASSOCIATED WITH TYPE 2 DIABETES MELLITUS: ICD-10-CM

## 2025-04-21 DIAGNOSIS — E11.69 HYPERLIPIDEMIA ASSOCIATED WITH TYPE 2 DIABETES MELLITUS: ICD-10-CM

## 2025-04-21 DIAGNOSIS — F41.1 GENERALIZED ANXIETY DISORDER: ICD-10-CM

## 2025-04-21 DIAGNOSIS — Z82.49 FAMILY HISTORY OF CARDIAC DISORDER: ICD-10-CM

## 2025-04-21 DIAGNOSIS — E11.8 CONTROLLED TYPE 2 DIABETES MELLITUS WITH COMPLICATION, WITHOUT LONG-TERM CURRENT USE OF INSULIN: ICD-10-CM

## 2025-04-21 DIAGNOSIS — R00.0 TACHYCARDIA: ICD-10-CM

## 2025-04-21 DIAGNOSIS — R94.31 ABNORMAL ECG: ICD-10-CM

## 2025-04-21 LAB
AORTIC ROOT ANNULUS: 3.1 CM
ASCENDING AORTA: 2.89 CM
AV INDEX (PROSTH): 0.83
AV MEAN GRADIENT: 5 MMHG
AV PEAK GRADIENT: 9 MMHG
AV REGURGITATION PRESSURE HALF TIME: 837 MS
AV VALVE AREA BY VELOCITY RATIO: 2.3 CM²
AV VALVE AREA: 2.6 CM²
AV VELOCITY RATIO: 0.73
BSA FOR ECHO PROCEDURE: 1.7 M2
CV ECHO LV RWT: 0.55 CM
DOP CALC AO PEAK VEL: 1.5 M/S
DOP CALC AO VTI: 31.3 CM
DOP CALC LVOT AREA: 3.1 CM2
DOP CALC LVOT DIAMETER: 2 CM
DOP CALC LVOT PEAK VEL: 1.1 M/S
DOP CALC LVOT STROKE VOLUME: 81.6 CM3
DOP CALC RVOT PEAK VEL: 0.91 M/S
DOP CALC RVOT VTI: 14.4 CM
DOP CALCLVOT PEAK VEL VTI: 26 CM
E WAVE DECELERATION TIME: 152 MSEC
E/A RATIO: 0.67
E/E' RATIO: 11 M/S
ECHO LV POSTERIOR WALL: 1.2 CM (ref 0.6–1.1)
EJECTION FRACTION: 60 %
FRACTIONAL SHORTENING: 34.1 % (ref 28–44)
INTERVENTRICULAR SEPTUM: 1.1 CM (ref 0.6–1.1)
IVC DIAMETER: 1.67 CM
IVRT: 80 MSEC
LA MAJOR: 4.6 CM
LA MINOR: 4.5 CM
LA WIDTH: 3.4 CM
LEFT ATRIUM SIZE: 3.3 CM
LEFT ATRIUM VOLUME INDEX: 26 ML/M2
LEFT ATRIUM VOLUME: 43 CM3
LEFT INTERNAL DIMENSION IN SYSTOLE: 2.9 CM (ref 2.1–4)
LEFT VENTRICLE DIASTOLIC VOLUME INDEX: 50.89 ML/M2
LEFT VENTRICLE DIASTOLIC VOLUME: 86 ML
LEFT VENTRICLE MASS INDEX: 106.5 G/M2
LEFT VENTRICLE SYSTOLIC VOLUME INDEX: 18.3 ML/M2
LEFT VENTRICLE SYSTOLIC VOLUME: 31 ML
LEFT VENTRICULAR INTERNAL DIMENSION IN DIASTOLE: 4.4 CM (ref 3.5–6)
LEFT VENTRICULAR MASS: 180 G
LV LATERAL E/E' RATIO: 12.2 M/S
LV SEPTAL E/E' RATIO: 10.2 M/S
LVED V (TEICH): 86.06 ML
LVES V (TEICH): 31.19 ML
LVOT MG: 3 MMHG
LVOT MV: 0.84 CM/S
MV PEAK A VEL: 0.91 M/S
MV PEAK E VEL: 0.61 M/S
MV STENOSIS PRESSURE HALF TIME: 44.11 MS
MV VALVE AREA P 1/2 METHOD: 4.99 CM2
PISA AR MAX VEL: 3.75 M/S
PISA MRMAX VEL: 2.57 M/S
PISA TR MAX VEL: 2.4 M/S
PV MEAN GRADIENT: 2 MMHG
RA MAJOR: 3.86 CM
RA PRESSURE ESTIMATED: 3 MMHG
RA WIDTH: 2.4 CM
RV TB RVSP: 5 MMHG
STJ: 2.85 CM
TDI LATERAL: 0.05 M/S
TDI SEPTAL: 0.06 M/S
TDI: 0.06 M/S
TR MAX PG: 23 MMHG
TR MEAN GRADIENT: 20 MMHG
TRICUSPID ANNULAR PLANE SYSTOLIC EXCURSION: 1.98 CM
TV REST PULMONARY ARTERY PRESSURE: 26 MMHG
Z-SCORE OF LEFT VENTRICULAR DIMENSION IN END DIASTOLE: -0.67
Z-SCORE OF LEFT VENTRICULAR DIMENSION IN END SYSTOLE: -0.04

## 2025-04-21 PROCEDURE — 93017 CV STRESS TEST TRACING ONLY: CPT

## 2025-04-21 PROCEDURE — A9502 TC99M TETROFOSMIN: HCPCS | Performed by: INTERNAL MEDICINE

## 2025-04-21 PROCEDURE — 63600175 PHARM REV CODE 636 W HCPCS: Performed by: INTERNAL MEDICINE

## 2025-04-21 PROCEDURE — 93306 TTE W/DOPPLER COMPLETE: CPT | Mod: 26,,, | Performed by: INTERNAL MEDICINE

## 2025-04-21 PROCEDURE — 93306 TTE W/DOPPLER COMPLETE: CPT

## 2025-04-21 RX ORDER — REGADENOSON 0.08 MG/ML
0.4 INJECTION, SOLUTION INTRAVENOUS ONCE
Status: COMPLETED | OUTPATIENT
Start: 2025-04-21 | End: 2025-04-21

## 2025-04-21 RX ADMIN — TETROFOSMIN 32.2 MILLICURIE: 1.38 INJECTION, POWDER, LYOPHILIZED, FOR SOLUTION INTRAVENOUS at 09:04

## 2025-04-21 RX ADMIN — TETROFOSMIN 9.8 MILLICURIE: 1.38 INJECTION, POWDER, LYOPHILIZED, FOR SOLUTION INTRAVENOUS at 08:04

## 2025-04-21 RX ADMIN — REGADENOSON 0.4 MG: 0.08 INJECTION, SOLUTION INTRAVENOUS at 09:04

## 2025-04-22 ENCOUNTER — RESULTS FOLLOW-UP (OUTPATIENT)
Dept: CARDIOLOGY | Facility: CLINIC | Age: 80
End: 2025-04-22

## 2025-04-22 ENCOUNTER — TELEPHONE (OUTPATIENT)
Dept: CARDIOLOGY | Facility: CLINIC | Age: 80
End: 2025-04-22
Payer: MEDICARE

## 2025-04-22 LAB
CV STRESS BASE HR: 74 BPM
DIASTOLIC BLOOD PRESSURE: 82 MMHG
EJECTION FRACTION- HIGH: 73 %
END DIASTOLIC INDEX-HIGH: 165 ML/M2
END DIASTOLIC INDEX-LOW: 101 ML/M2
END SYSTOLIC INDEX-HIGH: 64 ML/M2
END SYSTOLIC INDEX-LOW: 28 ML/M2
NUC REST EJECTION FRACTION: 63
NUC STRESS EJECTION FRACTION: 69 %
OHS CV CPX 85 PERCENT MAX PREDICTED HEART RATE MALE: 120
OHS CV CPX MAX PREDICTED HEART RATE: 141
OHS CV CPX PATIENT IS FEMALE: 1
OHS CV CPX PATIENT IS MALE: 0
OHS CV CPX PEAK DIASTOLIC BLOOD PRESSURE: 76 MMHG
OHS CV CPX PEAK HEAR RATE: 115 BPM
OHS CV CPX PEAK RATE PRESSURE PRODUCT: NORMAL
OHS CV CPX PEAK SYSTOLIC BLOOD PRESSURE: 146 MMHG
OHS CV CPX PERCENT MAX PREDICTED HEART RATE ACHIEVED: 84
OHS CV CPX RATE PRESSURE PRODUCT PRESENTING: NORMAL
OHS CV INITIAL DOSE: 9.8 MCG/KG/MIN
OHS CV PEAK DOSE: 32.2 MCG/KG/MIN
RETIRED EF AND QEF - SEE NOTES: 59 %
SYSTOLIC BLOOD PRESSURE: 144 MMHG

## 2025-04-22 RX ORDER — NITROGLYCERIN 0.4 MG/1
0.4 TABLET SUBLINGUAL EVERY 5 MIN PRN
Qty: 30 TABLET | Refills: 0 | Status: SHIPPED | OUTPATIENT
Start: 2025-04-22 | End: 2026-04-22

## 2025-04-22 RX ORDER — ASPIRIN 81 MG/1
81 TABLET ORAL DAILY
Qty: 90 TABLET | Refills: 1 | Status: SHIPPED | OUTPATIENT
Start: 2025-04-22

## 2025-04-22 NOTE — TELEPHONE ENCOUNTER
Spoke with patient to advise that Per Dr. Metzger: results of nuclear stress test reveal an area of mild to moderate plaque along with scar - will continue medical management - take aspirin 81 mg daily it not already doing so, need to monitor BP and pulse - if any severe chest pain/angina - recommend taking nitroglycerin - I have sent to local pharmacy. Follow up with me as scheduled or sooner to discuss further. Patient voiced understanding.      ----- Message from Anival Metzger MD sent at 4/22/2025  1:05 PM CDT -----  Please contact the patient and let them know that their results of nuclear stress test reveal an area of mild to moderate plaque along with scar - will continue medical management - take aspirin 81 mg daily it not already doing so, need to monitor BP and pulse - if any severe chest pain/angina - recommend taking nitroglycerin - I have sent to local pharmacy. Follow up with me as scheduled or   sooner to discuss further.   ----- Message -----  From: Og Rodriguez MD  Sent: 4/22/2025  12:20 PM CDT  To: Anival Metzger MD

## 2025-04-29 ENCOUNTER — OFFICE VISIT (OUTPATIENT)
Dept: GASTROENTEROLOGY | Facility: CLINIC | Age: 80
End: 2025-04-29
Payer: MEDICARE

## 2025-04-29 VITALS
DIASTOLIC BLOOD PRESSURE: 73 MMHG | HEART RATE: 91 BPM | BODY MASS INDEX: 23.69 KG/M2 | HEIGHT: 65 IN | SYSTOLIC BLOOD PRESSURE: 113 MMHG | WEIGHT: 142.19 LBS

## 2025-04-29 DIAGNOSIS — K21.9 GASTROESOPHAGEAL REFLUX DISEASE, UNSPECIFIED WHETHER ESOPHAGITIS PRESENT: ICD-10-CM

## 2025-04-29 DIAGNOSIS — R10.13 EPIGASTRIC PAIN: ICD-10-CM

## 2025-04-29 DIAGNOSIS — R07.2 SUBSTERNAL CHEST PAIN: ICD-10-CM

## 2025-04-29 DIAGNOSIS — K21.9 GASTROESOPHAGEAL REFLUX DISEASE, UNSPECIFIED WHETHER ESOPHAGITIS PRESENT: Primary | ICD-10-CM

## 2025-04-29 PROCEDURE — 3074F SYST BP LT 130 MM HG: CPT | Mod: CPTII,S$GLB,, | Performed by: NURSE PRACTITIONER

## 2025-04-29 PROCEDURE — 1160F RVW MEDS BY RX/DR IN RCRD: CPT | Mod: CPTII,S$GLB,, | Performed by: NURSE PRACTITIONER

## 2025-04-29 PROCEDURE — 3288F FALL RISK ASSESSMENT DOCD: CPT | Mod: CPTII,S$GLB,, | Performed by: NURSE PRACTITIONER

## 2025-04-29 PROCEDURE — 3072F LOW RISK FOR RETINOPATHY: CPT | Mod: CPTII,S$GLB,, | Performed by: NURSE PRACTITIONER

## 2025-04-29 PROCEDURE — 1101F PT FALLS ASSESS-DOCD LE1/YR: CPT | Mod: CPTII,S$GLB,, | Performed by: NURSE PRACTITIONER

## 2025-04-29 PROCEDURE — 1159F MED LIST DOCD IN RCRD: CPT | Mod: CPTII,S$GLB,, | Performed by: NURSE PRACTITIONER

## 2025-04-29 PROCEDURE — 1126F AMNT PAIN NOTED NONE PRSNT: CPT | Mod: CPTII,S$GLB,, | Performed by: NURSE PRACTITIONER

## 2025-04-29 PROCEDURE — 99999 PR PBB SHADOW E&M-EST. PATIENT-LVL V: CPT | Mod: PBBFAC,,, | Performed by: NURSE PRACTITIONER

## 2025-04-29 PROCEDURE — 3078F DIAST BP <80 MM HG: CPT | Mod: CPTII,S$GLB,, | Performed by: NURSE PRACTITIONER

## 2025-04-29 PROCEDURE — 99214 OFFICE O/P EST MOD 30 MIN: CPT | Mod: S$GLB,,, | Performed by: NURSE PRACTITIONER

## 2025-04-29 RX ORDER — SUCRALFATE 1 G/1
1 TABLET ORAL
Qty: 120 TABLET | Refills: 0 | Status: SHIPPED | OUTPATIENT
Start: 2025-04-29 | End: 2025-04-30

## 2025-04-29 NOTE — PROGRESS NOTES
Clinic Follow Up:  [facility] Follow Up Note    Reason for Follow Up:  The primary encounter diagnosis was Gastroesophageal reflux disease, unspecified whether esophagitis present. Diagnoses of Epigastric pain and Substernal chest pain were also pertinent to this visit.    PCP: Loli Erwin       HPI:  This is a 79 y.o. female here for follow up.   She has seen ELSA Cole and Dr. Gregory in the past. She is new to me.   She has chronic GI issues-- likely compounded by stress.   However, she has had progressively worsening symptoms over the last 6 months. She has nausea. This nausea is severe at times. She will feel hungry but when she goes to eat, she gets very nauseated even just looking at it or with eating. She also reports worsening indigestion and heartburn. She had burning substernal chest pain. Has had stress test with cardiology recently.   She has been taking Aleve once a day. She denies any overt GI bleeding. No hematochezia or melena.   Colonoscopy (2022) with polyps. Recall in 5 years   EGD (2023) with erosive gastropathy with recent stigmata of bleeding. Negative for HP.   She has been taking pantoprazole 40 mg daily.     Review of Systems   Constitutional:  Negative for activity change and appetite change.        As per interval history above   Respiratory:  Negative for cough and shortness of breath.    Cardiovascular:  Positive for chest pain.   Gastrointestinal:  Positive for abdominal pain and nausea.   Skin:  Negative for color change and rash.       Medical History:  Past Medical History:   Diagnosis Date    Anxiety     Anxiety     Cataract (lens) fragments in eye following cataract surgery, bilateral     DM (diabetes mellitus) 2013     10/21/2018    DM (diabetes mellitus) 2013     am 12/09/2019    DM2 (diabetes mellitus, type 2) 01/13    GERD (gastroesophageal reflux disease)     Hiatal hernia     Mild nonproliferative diabetic retinopathy of both  "eyes 1/22/15    OS>OD    Mild nonproliferative diabetic retinopathy of both eyes 1/22/2015    OS>OD     Osteoarthritis        Surgical History:   Past Surgical History:   Procedure Laterality Date    AUGMENTATION OF BREAST      saline    BREAST SURGERY Bilateral 1992    saline implants    BUNIONECTOMY      CATARACT EXTRACTION W/  INTRAOCULAR LENS IMPLANT Bilateral     12/12/18 OD, 1/9/19 OS, Dr Higuera    CHOLECYSTECTOMY  05/12    COLONOSCOPY  08    1 POLYP    COLONOSCOPY N/A 12/6/2016    Procedure: COLONOSCOPY with biopsies;  Surgeon: Sarah Schmidt MD;  Location: [facility];  Service: Endoscopy;  Laterality: N/A;    COLONOSCOPY N/A 10/19/2022    Procedure: COLONOSCOPY;  Surgeon: Lindsey Gregory MD;  Location: [facility];  Service: Endoscopy;  Laterality: N/A;    ESOPHAGOGASTRODUODENOSCOPY N/A 1/31/2023    Procedure: EGD (ESOPHAGOGASTRODUODENOSCOPY);  Surgeon: Harvinder Head MD;  Location: [facility];  Service: Endoscopy;  Laterality: N/A;    PCIOL  Right 12/12/2018    DR. HIGUERA    TONSILLECTOMY, ADENOIDECTOMY         Family History:   Family History   Problem Relation Name Age of Onset    Heart failure Father      Suicide Father      Diabetes Mother      Heart failure Mother      Hypertension Mother      Stroke Mother      Heart failure Brother      Cancer Brother          full body    Hypertension Brother      Coronary artery disease Brother      Diabetes Maternal Grandmother      Hypertension Son      Suicide Son         Social History:   Social History[1]    Allergies: Review of patient's allergies indicates:  No Active Allergies    Home Medications:  Medications Ordered Prior to Encounter[2]    /73 (BP Location: Right arm, Patient Position: Sitting)   Pulse 91   Ht 5' 5" (1.651 m)   Wt 64.5 kg (142 lb 3.2 oz)   BMI 23.66 kg/m²   Body mass index is 23.66 kg/m².  Physical Exam  Constitutional:       General: She is not in acute distress.  HENT:      Head: Normocephalic. "   Cardiovascular:      Rate and Rhythm: Normal rate and regular rhythm.      Heart sounds: Normal heart sounds. No murmur heard.  Pulmonary:      Effort: Pulmonary effort is normal. No respiratory distress.      Breath sounds: Normal breath sounds.   Abdominal:      General: Bowel sounds are normal.   Neurological:      General: No focal deficit present.      Mental Status: She is alert.   Psychiatric:         Mood and Affect: Mood normal.         Behavior: Behavior normal.         Judgment: Judgment normal.         Labs: Pertinent labs reviewed.  CRC Screening: UTD    Assessment:   1. Gastroesophageal reflux disease, unspecified whether esophagitis present    2. Epigastric pain    3. Substernal chest pain        Recommendations:   - continue pantoprazole daily  - add Carafate with meals  - needs repeat EGD  - avoid all NSAIDs. Ok to take tylenol  - GERD diet discussed.     Gastroesophageal reflux disease, unspecified whether esophagitis present  -     Ambulatory referral/consult to Endo Procedure   -     sucralfate (CARAFATE) 1 gram tablet; Take 1 tablet (1 g total) by mouth 4 (four) times daily before meals and nightly.  Dispense: 120 tablet; Refill: 0    Epigastric pain  -     Ambulatory referral/consult to Endo Procedure   -     sucralfate (CARAFATE) 1 gram tablet; Take 1 tablet (1 g total) by mouth 4 (four) times daily before meals and nightly.  Dispense: 120 tablet; Refill: 0    Substernal chest pain  -     Ambulatory referral/consult to Endo Procedure   -     sucralfate (CARAFATE) 1 gram tablet; Take 1 tablet (1 g total) by mouth 4 (four) times daily before meals and nightly.  Dispense: 120 tablet; Refill: 0    Return to Clinic:  Follow up to be determined after results/ procedure(s).    Thank you for the opportunity to participate in the care of this patient.  JA Connolly             [1]   Social History  Tobacco Use    Smoking status: Never     Passive exposure: Never     Smokeless tobacco: Never   Substance Use Topics    Alcohol use: Yes     Comment: occasionally    Drug use: No   [2]   Current Outpatient Medications on File Prior to Visit   Medication Sig Dispense Refill    aspirin (ECOTRIN) 81 MG EC tablet Take 1 tablet (81 mg total) by mouth once daily. 90 tablet 1    blood sugar diagnostic (BLOOD GLUCOSE TEST) Strp 1 each by Misc.(Non-Drug; Combo Route) route once daily. One touch verio  each 4    blood-glucose meter kit Use as instructed 1 each 0    buPROPion (WELLBUTRIN XL) 300 MG 24 hr tablet Take 1 tablet (300 mg total) by mouth once daily. 90 tablet 3    cholecalciferol, vitamin D3, (VITAMIN D3) 25 mcg (1,000 unit) capsule Take 1,000 Units by mouth once daily.      clonazePAM (KLONOPIN) 0.5 MG tablet PRN severe anxiety. 30 tablet 0    cyanocobalamin (VITAMIN B-12) 1000 MCG tablet Take 100 mcg by mouth once daily. Taking a B-complex      fish oil-omega-3 fatty acids 300-1,000 mg capsule Take 2 g by mouth once daily.      fluticasone propionate (FLONASE) 50 mcg/actuation nasal spray 1 spray in each nostril 15.8 mL 2    lancets (ONE TOUCH DELICA LANCETS) 33 gauge Misc 1 lancet by Misc.(Non-Drug; Combo Route) route 2 (two) times daily. 200 each 3    lysine 500 mg Tab Take 500 mg by mouth once daily.      metFORMIN (GLUCOPHAGE-XR) 500 MG ER 24hr tablet Take 2 tablets (1,000 mg total) by mouth 2 (two) times daily. 360 tablet 1    montelukast (SINGULAIR) 10 mg tablet 1 tablet in the evening      multivitamin capsule Take 1 capsule by mouth once daily.      nitroGLYCERIN (NITROSTAT) 0.4 MG SL tablet Place 1 tablet (0.4 mg total) under the tongue every 5 (five) minutes as needed for Chest pain. 30 tablet 0    ondansetron (ZOFRAN) 4 MG tablet Take 1 tablet (4 mg total) by mouth every 6 (six) hours as needed for Nausea. 20 tablet 0    oxybutynin (DITROPAN) 5 MG Tab Take 1 tablet (5 mg total) by mouth 2 (two) times daily. 180 tablet 3    pantoprazole (PROTONIX) 40 MG tablet Take  1 tablet (40 mg total) by mouth once daily. 90 tablet 3    PHENAZOPYRIDINE HCL (AZO ORAL) Take 1 tablet by mouth daily as needed.       pravastatin (PRAVACHOL) 40 MG tablet Take 1 tablet (40 mg total) by mouth once daily. 90 tablet 3    traZODone (DESYREL) 50 MG tablet Take 2 tablets (100 mg total) by mouth every evening. 90 tablet 1    levocetirizine (XYZAL) 5 MG tablet Take 1 tablet (5 mg total) by mouth every evening. 30 tablet 11     No current facility-administered medications on file prior to visit.

## 2025-04-29 NOTE — H&P (VIEW-ONLY)
Clinic Follow Up:  [facility] Follow Up Note    Reason for Follow Up:  The primary encounter diagnosis was Gastroesophageal reflux disease, unspecified whether esophagitis present. Diagnoses of Epigastric pain and Substernal chest pain were also pertinent to this visit.    PCP: Loli Erwin       HPI:  This is a 79 y.o. female here for follow up.   She has seen ELSA Cole and Dr. Gregory in the past. She is new to me.   She has chronic GI issues-- likely compounded by stress.   However, she has had progressively worsening symptoms over the last 6 months. She has nausea. This nausea is severe at times. She will feel hungry but when she goes to eat, she gets very nauseated even just looking at it or with eating. She also reports worsening indigestion and heartburn. She had burning substernal chest pain. Has had stress test with cardiology recently.   She has been taking Aleve once a day. She denies any overt GI bleeding. No hematochezia or melena.   Colonoscopy (2022) with polyps. Recall in 5 years   EGD (2023) with erosive gastropathy with recent stigmata of bleeding. Negative for HP.   She has been taking pantoprazole 40 mg daily.     Review of Systems   Constitutional:  Negative for activity change and appetite change.        As per interval history above   Respiratory:  Negative for cough and shortness of breath.    Cardiovascular:  Positive for chest pain.   Gastrointestinal:  Positive for abdominal pain and nausea.   Skin:  Negative for color change and rash.       Medical History:  Past Medical History:   Diagnosis Date    Anxiety     Anxiety     Cataract (lens) fragments in eye following cataract surgery, bilateral     DM (diabetes mellitus) 2013     10/21/2018    DM (diabetes mellitus) 2013     am 12/09/2019    DM2 (diabetes mellitus, type 2) 01/13    GERD (gastroesophageal reflux disease)     Hiatal hernia     Mild nonproliferative diabetic retinopathy of both  "eyes 1/22/15    OS>OD    Mild nonproliferative diabetic retinopathy of both eyes 1/22/2015    OS>OD     Osteoarthritis        Surgical History:   Past Surgical History:   Procedure Laterality Date    AUGMENTATION OF BREAST      saline    BREAST SURGERY Bilateral 1992    saline implants    BUNIONECTOMY      CATARACT EXTRACTION W/  INTRAOCULAR LENS IMPLANT Bilateral     12/12/18 OD, 1/9/19 OS, Dr Higuera    CHOLECYSTECTOMY  05/12    COLONOSCOPY  08    1 POLYP    COLONOSCOPY N/A 12/6/2016    Procedure: COLONOSCOPY with biopsies;  Surgeon: Sarah Schmidt MD;  Location: [facility];  Service: Endoscopy;  Laterality: N/A;    COLONOSCOPY N/A 10/19/2022    Procedure: COLONOSCOPY;  Surgeon: Lindsey Gregory MD;  Location: [facility];  Service: Endoscopy;  Laterality: N/A;    ESOPHAGOGASTRODUODENOSCOPY N/A 1/31/2023    Procedure: EGD (ESOPHAGOGASTRODUODENOSCOPY);  Surgeon: Harvinder Head MD;  Location: [facility];  Service: Endoscopy;  Laterality: N/A;    PCIOL  Right 12/12/2018    DR. HIGUERA    TONSILLECTOMY, ADENOIDECTOMY         Family History:   Family History   Problem Relation Name Age of Onset    Heart failure Father      Suicide Father      Diabetes Mother      Heart failure Mother      Hypertension Mother      Stroke Mother      Heart failure Brother      Cancer Brother          full body    Hypertension Brother      Coronary artery disease Brother      Diabetes Maternal Grandmother      Hypertension Son      Suicide Son         Social History:   Social History[1]    Allergies: Review of patient's allergies indicates:  No Active Allergies    Home Medications:  Medications Ordered Prior to Encounter[2]    /73 (BP Location: Right arm, Patient Position: Sitting)   Pulse 91   Ht 5' 5" (1.651 m)   Wt 64.5 kg (142 lb 3.2 oz)   BMI 23.66 kg/m²   Body mass index is 23.66 kg/m².  Physical Exam  Constitutional:       General: She is not in acute distress.  HENT:      Head: Normocephalic. "   Cardiovascular:      Rate and Rhythm: Normal rate and regular rhythm.      Heart sounds: Normal heart sounds. No murmur heard.  Pulmonary:      Effort: Pulmonary effort is normal. No respiratory distress.      Breath sounds: Normal breath sounds.   Abdominal:      General: Bowel sounds are normal.   Neurological:      General: No focal deficit present.      Mental Status: She is alert.   Psychiatric:         Mood and Affect: Mood normal.         Behavior: Behavior normal.         Judgment: Judgment normal.         Labs: Pertinent labs reviewed.  CRC Screening: UTD    Assessment:   1. Gastroesophageal reflux disease, unspecified whether esophagitis present    2. Epigastric pain    3. Substernal chest pain        Recommendations:   - continue pantoprazole daily  - add Carafate with meals  - needs repeat EGD  - avoid all NSAIDs. Ok to take tylenol  - GERD diet discussed.     Gastroesophageal reflux disease, unspecified whether esophagitis present  -     Ambulatory referral/consult to Endo Procedure   -     sucralfate (CARAFATE) 1 gram tablet; Take 1 tablet (1 g total) by mouth 4 (four) times daily before meals and nightly.  Dispense: 120 tablet; Refill: 0    Epigastric pain  -     Ambulatory referral/consult to Endo Procedure   -     sucralfate (CARAFATE) 1 gram tablet; Take 1 tablet (1 g total) by mouth 4 (four) times daily before meals and nightly.  Dispense: 120 tablet; Refill: 0    Substernal chest pain  -     Ambulatory referral/consult to Endo Procedure   -     sucralfate (CARAFATE) 1 gram tablet; Take 1 tablet (1 g total) by mouth 4 (four) times daily before meals and nightly.  Dispense: 120 tablet; Refill: 0    Return to Clinic:  Follow up to be determined after results/ procedure(s).    Thank you for the opportunity to participate in the care of this patient.  JA Connolly             [1]   Social History  Tobacco Use    Smoking status: Never     Passive exposure: Never     Smokeless tobacco: Never   Substance Use Topics    Alcohol use: Yes     Comment: occasionally    Drug use: No   [2]   Current Outpatient Medications on File Prior to Visit   Medication Sig Dispense Refill    aspirin (ECOTRIN) 81 MG EC tablet Take 1 tablet (81 mg total) by mouth once daily. 90 tablet 1    blood sugar diagnostic (BLOOD GLUCOSE TEST) Strp 1 each by Misc.(Non-Drug; Combo Route) route once daily. One touch verio  each 4    blood-glucose meter kit Use as instructed 1 each 0    buPROPion (WELLBUTRIN XL) 300 MG 24 hr tablet Take 1 tablet (300 mg total) by mouth once daily. 90 tablet 3    cholecalciferol, vitamin D3, (VITAMIN D3) 25 mcg (1,000 unit) capsule Take 1,000 Units by mouth once daily.      clonazePAM (KLONOPIN) 0.5 MG tablet PRN severe anxiety. 30 tablet 0    cyanocobalamin (VITAMIN B-12) 1000 MCG tablet Take 100 mcg by mouth once daily. Taking a B-complex      fish oil-omega-3 fatty acids 300-1,000 mg capsule Take 2 g by mouth once daily.      fluticasone propionate (FLONASE) 50 mcg/actuation nasal spray 1 spray in each nostril 15.8 mL 2    lancets (ONE TOUCH DELICA LANCETS) 33 gauge Misc 1 lancet by Misc.(Non-Drug; Combo Route) route 2 (two) times daily. 200 each 3    lysine 500 mg Tab Take 500 mg by mouth once daily.      metFORMIN (GLUCOPHAGE-XR) 500 MG ER 24hr tablet Take 2 tablets (1,000 mg total) by mouth 2 (two) times daily. 360 tablet 1    montelukast (SINGULAIR) 10 mg tablet 1 tablet in the evening      multivitamin capsule Take 1 capsule by mouth once daily.      nitroGLYCERIN (NITROSTAT) 0.4 MG SL tablet Place 1 tablet (0.4 mg total) under the tongue every 5 (five) minutes as needed for Chest pain. 30 tablet 0    ondansetron (ZOFRAN) 4 MG tablet Take 1 tablet (4 mg total) by mouth every 6 (six) hours as needed for Nausea. 20 tablet 0    oxybutynin (DITROPAN) 5 MG Tab Take 1 tablet (5 mg total) by mouth 2 (two) times daily. 180 tablet 3    pantoprazole (PROTONIX) 40 MG tablet Take  1 tablet (40 mg total) by mouth once daily. 90 tablet 3    PHENAZOPYRIDINE HCL (AZO ORAL) Take 1 tablet by mouth daily as needed.       pravastatin (PRAVACHOL) 40 MG tablet Take 1 tablet (40 mg total) by mouth once daily. 90 tablet 3    traZODone (DESYREL) 50 MG tablet Take 2 tablets (100 mg total) by mouth every evening. 90 tablet 1    levocetirizine (XYZAL) 5 MG tablet Take 1 tablet (5 mg total) by mouth every evening. 30 tablet 11     No current facility-administered medications on file prior to visit.

## 2025-04-29 NOTE — PATIENT INSTRUCTIONS
Lifestyle modifications  for acid reflux/GERD    These include weight loss, proper elevation of the head of the bed, eating small frequent meals, avoiding foods that trigger reflux, avoiding late meals and staying upright for at least 1.5 hours after meals.    Foods that trigger reflux and should be avoided:  - citrus foods such as tomato based products/red sauces.   - citrus drinks such as orange juice  - spicy foods  - high fat foods  - alcohol  - caffeine  - chocolate  - spearmint/peppermint     Stop all anti-inflammatory medications. May use tylenol products as needed.

## 2025-04-30 ENCOUNTER — PATIENT MESSAGE (OUTPATIENT)
Dept: INTERNAL MEDICINE | Facility: CLINIC | Age: 80
End: 2025-04-30
Payer: MEDICARE

## 2025-04-30 RX ORDER — SUCRALFATE 1 G/1
TABLET ORAL
Qty: 360 TABLET | Refills: 0 | Status: SHIPPED | OUTPATIENT
Start: 2025-04-30

## 2025-05-02 ENCOUNTER — HOSPITAL ENCOUNTER (OUTPATIENT)
Dept: PREADMISSION TESTING | Facility: HOSPITAL | Age: 80
Discharge: HOME | End: 2025-05-02
Attending: FAMILY MEDICINE
Payer: MEDICARE

## 2025-05-02 DIAGNOSIS — K21.9 GASTROESOPHAGEAL REFLUX DISEASE, UNSPECIFIED WHETHER ESOPHAGITIS PRESENT: Primary | ICD-10-CM

## 2025-05-02 DIAGNOSIS — R07.2 SUBSTERNAL CHEST PAIN: ICD-10-CM

## 2025-05-02 DIAGNOSIS — R10.13 EPIGASTRIC PAIN: ICD-10-CM

## 2025-05-03 ENCOUNTER — E-CONSULT (OUTPATIENT)
Dept: CARDIOLOGY | Facility: CLINIC | Age: 80
End: 2025-05-03
Payer: MEDICARE

## 2025-05-03 DIAGNOSIS — Z01.810 PREOP CARDIOVASCULAR EXAM: Primary | ICD-10-CM

## 2025-05-03 NOTE — CONSULTS
[facility]  Response for E-Consult     Patient Name: Connie Fields  MRN: [id number]  Primary Care Provider: Loli Erwin MD   Requesting Provider: Mirella Sherwood NP  E-Consult to General Cardiology  Consult performed by: Og Rodriguez MD  Consult ordered by: Mirella Sherwood NP          E consult for preop clearance of colonoscopy  The chart reviewed.  PMH hld dm  04/25 phar Nuclear stress test showed anterior scar with trace ischemia  EF nml.    Plan  Elevated periop risk of CV events for non-high risk procedure.  Ok to proceed the scheduled procedure without further cardiac study.  OK to hold ASA 5 days before the procedure and resume postop once hemodynamically stable      Total time of Consultation: 10 minute    I did not speak to the requesting provider verbally about this.     *This eConsult is based on the clinical data available to me and is furnished without benefit of a physical examination. The eConsult will need to be interpreted in light of any clinical issues or changes in patient status not available to me at the time of filing this eConsults. Significant changes in patient condition or level of acuity should result in immediate formal consultation and reevaluation. Please alert me if you have further questions.    Thank you for this eConsult referral.     Og Rodriguez MD  [facility]

## 2025-05-05 ENCOUNTER — LAB VISIT (OUTPATIENT)
Dept: LAB | Facility: HOSPITAL | Age: 80
End: 2025-05-05
Attending: FAMILY MEDICINE
Payer: MEDICARE

## 2025-05-05 DIAGNOSIS — E11.69 HYPERLIPIDEMIA ASSOCIATED WITH TYPE 2 DIABETES MELLITUS: ICD-10-CM

## 2025-05-05 DIAGNOSIS — E11.9 DIABETES MELLITUS TYPE 2 WITHOUT RETINOPATHY: ICD-10-CM

## 2025-05-05 DIAGNOSIS — E78.5 HYPERLIPIDEMIA ASSOCIATED WITH TYPE 2 DIABETES MELLITUS: ICD-10-CM

## 2025-05-05 LAB
ALBUMIN SERPL BCP-MCNC: 3.8 G/DL (ref 3.5–5.2)
ALP SERPL-CCNC: 52 UNIT/L (ref 40–150)
ALT SERPL W/O P-5'-P-CCNC: 16 UNIT/L (ref 10–44)
ANION GAP (OHS): 11 MMOL/L (ref 8–16)
AST SERPL-CCNC: 16 UNIT/L (ref 11–45)
BILIRUB SERPL-MCNC: 0.8 MG/DL (ref 0.1–1)
BUN SERPL-MCNC: 13 MG/DL (ref 8–23)
CALCIUM SERPL-MCNC: 9.1 MG/DL (ref 8.7–10.5)
CHLORIDE SERPL-SCNC: 104 MMOL/L (ref 95–110)
CO2 SERPL-SCNC: 27 MMOL/L (ref 23–29)
CREAT SERPL-MCNC: 0.7 MG/DL (ref 0.5–1.4)
EAG (OHS): 114 MG/DL (ref 68–131)
GFR SERPLBLD CREATININE-BSD FMLA CKD-EPI: >60 ML/MIN/1.73/M2
GLUCOSE SERPL-MCNC: 112 MG/DL (ref 70–110)
HBA1C MFR BLD: 5.6 % (ref 4–5.6)
POTASSIUM SERPL-SCNC: 4.4 MMOL/L (ref 3.5–5.1)
PROT SERPL-MCNC: 6.2 GM/DL (ref 6–8.4)
SODIUM SERPL-SCNC: 142 MMOL/L (ref 136–145)

## 2025-05-05 PROCEDURE — 83036 HEMOGLOBIN GLYCOSYLATED A1C: CPT

## 2025-05-05 PROCEDURE — 36415 COLL VENOUS BLD VENIPUNCTURE: CPT

## 2025-05-05 PROCEDURE — 84520 ASSAY OF UREA NITROGEN: CPT

## 2025-05-06 ENCOUNTER — RESULTS FOLLOW-UP (OUTPATIENT)
Dept: INTERNAL MEDICINE | Facility: CLINIC | Age: 80
End: 2025-05-06

## 2025-05-07 ENCOUNTER — OFFICE VISIT (OUTPATIENT)
Dept: INTERNAL MEDICINE | Facility: CLINIC | Age: 80
End: 2025-05-07
Payer: MEDICARE

## 2025-05-07 VITALS
OXYGEN SATURATION: 99 % | DIASTOLIC BLOOD PRESSURE: 66 MMHG | HEIGHT: 65 IN | BODY MASS INDEX: 23.21 KG/M2 | HEART RATE: 71 BPM | TEMPERATURE: 97 F | WEIGHT: 139.31 LBS | SYSTOLIC BLOOD PRESSURE: 114 MMHG

## 2025-05-07 DIAGNOSIS — E11.8 CONTROLLED TYPE 2 DIABETES MELLITUS WITH COMPLICATION, WITHOUT LONG-TERM CURRENT USE OF INSULIN: Primary | ICD-10-CM

## 2025-05-07 DIAGNOSIS — E11.69 HYPERLIPIDEMIA ASSOCIATED WITH TYPE 2 DIABETES MELLITUS: ICD-10-CM

## 2025-05-07 DIAGNOSIS — Z86.39 HISTORY OF DIABETIC RETINOPATHY: ICD-10-CM

## 2025-05-07 DIAGNOSIS — Z00.00 ANNUAL PHYSICAL EXAM: ICD-10-CM

## 2025-05-07 DIAGNOSIS — E11.9 DIABETES MELLITUS TYPE 2 WITHOUT RETINOPATHY: ICD-10-CM

## 2025-05-07 DIAGNOSIS — E78.5 HYPERLIPIDEMIA ASSOCIATED WITH TYPE 2 DIABETES MELLITUS: ICD-10-CM

## 2025-05-07 DIAGNOSIS — F51.01 PRIMARY INSOMNIA: ICD-10-CM

## 2025-05-07 PROBLEM — R11.0 NAUSEA: Status: RESOLVED | Noted: 2023-01-31 | Resolved: 2025-05-07

## 2025-05-07 PROBLEM — Z01.810 PREOP CARDIOVASCULAR EXAM: Status: RESOLVED | Noted: 2025-05-03 | Resolved: 2025-05-07

## 2025-05-07 PROCEDURE — 99999 PR PBB SHADOW E&M-EST. PATIENT-LVL IV: CPT | Mod: PBBFAC,,, | Performed by: FAMILY MEDICINE

## 2025-05-07 RX ORDER — METFORMIN HYDROCHLORIDE 500 MG/1
1000 TABLET, EXTENDED RELEASE ORAL 2 TIMES DAILY
Qty: 360 TABLET | Refills: 1 | Status: SHIPPED | OUTPATIENT
Start: 2025-05-07

## 2025-05-07 NOTE — PROGRESS NOTES
Connie Fields  05/07/2025  [id number]    Loli Erwin MD  Patient Care Team:  Loli Erwin MD as PCP - General (Family Medicine)  Juventino Higuera MD as Consulting Physician (Ophthalmology)  Edilia Vasquez LCSW as  (Psychiatry)  Tito Anderson OD as Consulting Physician (Optometry)          Visit Type:a scheduled routine follow-up visit    Chief Complaint:  Chief Complaint   Patient presents with    Annual Exam       History of Present Illness:    History of Present Illness    CHIEF COMPLAINT:  Ms. Fields presents today for annual checkup and follow up on blood sugar.    LABS:  Fasting glucose was 112, Hemoglobin A1C was 5.6, and kidney and liver function tests were normal.    CARDIOVASCULAR:  She experiences elevated heart rate and fatigue. Nuclear stress test on April 22nd showed an area of mild to moderate plaque with scar. Holter monitor revealed nine episodes of supraventricular tachycardia (SVT), with the longest episode lasting 22 beats and fastest heart rate reaching 133 BPM. She reports heart rate increases to 135-140 BPM during bowling or with anxiety, but can lower it through breathing techniques. She denies severe chest pain. Her first cardiology evaluation was at age 80 for preventive care. She denies history of hypertension.  She will see Cards this month to discuss.  She has not had chest pain.    GERD:  She experiences heartburn and nausea, with nocturnal symptoms severe enough to cause sensation of suffocation and wake her from sleep. She reports limited relief with OTC Pepto Bismol. She is currently taking medication prescribed by gastroenterologist 4 times daily before meals with some improvement in symptoms.      She will have EDG    DIABETES  Well controlled.  Plan to continue metformin        The following were reviewed at this visit: active problem list, medication list, allergies, family history, social history, and health  maintenance.  History:  Past Medical History:   Diagnosis Date    Anxiety     Anxiety     Cataract (lens) fragments in eye following cataract surgery, bilateral     DM (diabetes mellitus) 2013     10/21/2018    DM (diabetes mellitus) 2013     am 12/09/2019    DM2 (diabetes mellitus, type 2) 01/13    GERD (gastroesophageal reflux disease)     Hiatal hernia     Mild nonproliferative diabetic retinopathy of both eyes 1/22/15    OS>OD    Mild nonproliferative diabetic retinopathy of both eyes 1/22/2015    OS>OD     Osteoarthritis      Past Surgical History:   Procedure Laterality Date    AUGMENTATION OF BREAST      saline    BREAST SURGERY Bilateral 1992    saline implants    BUNIONECTOMY      CATARACT EXTRACTION W/  INTRAOCULAR LENS IMPLANT Bilateral     12/12/18 OD, 1/9/19 OS, Dr Higuera    CHOLECYSTECTOMY  05/12    COLONOSCOPY  08    1 POLYP    COLONOSCOPY N/A 12/6/2016    Procedure: COLONOSCOPY with biopsies;  Surgeon: Sarah Schmidt MD;  Location: [facility];  Service: Endoscopy;  Laterality: N/A;    COLONOSCOPY N/A 10/19/2022    Procedure: COLONOSCOPY;  Surgeon: Lindsey Gregory MD;  Location: [facility];  Service: Endoscopy;  Laterality: N/A;    ESOPHAGOGASTRODUODENOSCOPY N/A 1/31/2023    Procedure: EGD (ESOPHAGOGASTRODUODENOSCOPY);  Surgeon: Harvinder Head MD;  Location: [facility];  Service: Endoscopy;  Laterality: N/A;    PCIOL  Right 12/12/2018    DR. HIGUERA    TONSILLECTOMY, ADENOIDECTOMY       Problem List[1]    Medications:  Medications Ordered Prior to Encounter[2]    Medications have been reviewed and reconciled with patient at this visit.    Exam:  Wt Readings from Last 3 Encounters:   05/07/25 63.2 kg (139 lb 5.3 oz)   04/29/25 64.5 kg (142 lb 3.2 oz)   04/21/25 63 kg (139 lb)     Temp Readings from Last 3 Encounters:   05/07/25 96.7 °F (35.9 °C) (Tympanic)   11/14/24 96.8 °F (36 °C) (Tympanic)   05/09/24 97.2 °F (36.2 °C)     BP Readings from Last 3 Encounters:   05/07/25  114/66   04/29/25 113/73   04/21/25 (!) 144/82     Pulse Readings from Last 3 Encounters:   05/07/25 71   04/29/25 91   04/21/25 75     Body mass index is 23.19 kg/m².      Review of Systems   Constitutional: Negative.  Negative for chills and fever.   HENT: Negative.  Negative for congestion, sinus pain and sore throat.    Eyes:  Negative for blurred vision and double vision.   Respiratory:  Negative for cough, sputum production, shortness of breath and wheezing.    Cardiovascular:  Negative for chest pain, palpitations and leg swelling.   Gastrointestinal:  Negative for abdominal pain, constipation, diarrhea, heartburn, nausea and vomiting.   Genitourinary: Negative.    Musculoskeletal: Negative.    Skin: Negative.  Negative for rash.   Neurological: Negative.    Endo/Heme/Allergies: Negative.  Negative for polydipsia. Does not bruise/bleed easily.   Psychiatric/Behavioral:  Negative for depression and substance abuse.      Physical Exam  Nursing note reviewed.   Cardiovascular:      Rate and Rhythm: Normal rate and regular rhythm.   Pulmonary:      Effort: Pulmonary effort is normal. No respiratory distress.   Neurological:      Mental Status: She is alert and oriented to person, place, and time.   Psychiatric:         Mood and Affect: Mood normal.         Behavior: Behavior normal.         Thought Content: Thought content normal.         Judgment: Judgment normal.       Physical Exam             Laboratory Reviewed ({Yes)    Lab Results   Component Value Date    WBC 5.60 08/19/2022    HGB 13.4 08/19/2022    HCT 39.9 08/19/2022     08/19/2022    CHOL 148 11/11/2024    TRIG 95 11/11/2024    HDL 51 11/11/2024    ALT 16 05/05/2025    AST 16 05/05/2025     05/05/2025    K 4.4 05/05/2025     05/05/2025    CREATININE 0.7 05/05/2025    BUN 13 05/05/2025    CO2 27 05/05/2025    HGBA1C 5.6 05/05/2025       Assessment & Plan    Annual   Gastro-esophageal reflux disease without esophagitis  Diabetes type  2  Insmonia      Reviewed recent lab results: glucose slightly elevated at 112, hemoglobin A1C at 5.6 (WNL).  Assessed recent cardiology workup: nuclear stress test revealed mild to moderate plaque with scar, but normal heart function and valves. EF of 60-65%, indicating normal heart pumping function.  Holter monitor results showed 9 episodes of supraventricular tachycardia (SVT), with longest episode lasting 22 beats and maximum heart rate of 133 bpm.  Considered reports of heartburn and nausea; awaiting results of scheduled upper endoscopy.  Determined low risk for RSV but recommended vaccination due to age and provided information on its importance for seniors.    ## ATHEROSCLEROTIC HEART DISEASE:  - Nuclear stress test revealed mild to moderate plaque along with scarring, with normal heart function and valves.  - EF is 60-65%, which is within normal range.  - Discussed the possibility of heart catheterization to check for true blockages, but determined it is not recommended due to lack of significant symptoms.  - Explained EF significance, the nature of myocardial infarctions and their impact on cardiac muscle, and clarified the concept of breast attenuation in nuclear stress test for female patients.  - Prescribed nitroglycerin as needed for significant chest pain.  - Advised to continue medical management with aspirin 81 mg daily.    ## SUPRAVENTRICULAR TACHYCARDIA:  - Ms. Fields has been experiencing higher heart rate and fatigue, with 9 episodes of supraventricular tachycardia (SVT) in a week.  - The longest episode lasted 22 beats and the fastest was 133 bpm.  - Evaluated that these intermittent episodes are not considered adverse.  - Educated the patient on the typically benign nature of SVT.  - Advised to avoid caffeine and manage anxiety to reduce episodes, and recommended practicing breathing techniques when experiencing tachycardia to help regulate heart rate.    ## GASTROESOPHAGEAL REFLUX DISEASE:  -  Ms. Fields has been experiencing heartburn and nausea, with symptoms severe enough to cause nighttime awakening.  - Scheduled an upper endoscopy to investigate reflux symptoms.  - Prescribed Carafate to be taken 4 times daily, 1 hour before meals, as recommended by gastroenterologist.    ## PREDIABETES:  - Fasting blood sugar was 112, indicating elevated glucose levels.  - However, hemoglobin A1C is well controlled at 5.6, indicating good glycemic management.    ## LONG-TERM ASPIRIN USE:  - Prescribed aspirin 81 mg daily for continued use.    ## FOLLOW-UP:  - Ms. Fields to continue exercising, including walking.  - Ordered labs to be completed prior to next visit. In 6 months      Connie was seen today for annual exam.    Diagnoses and all orders for this visit:    Controlled type 2 diabetes mellitus with complication, without long-term current use of insulin  -     CBC Auto Differential; Future  -     Comprehensive Metabolic Panel; Future  -     Lipid Panel; Future  -     Hemoglobin A1C; Future  -     Microalbumin/Creatinine Ratio, Urine; Future    Diabetes mellitus type 2 without retinopathy  -     Comprehensive Metabolic Panel; Future  -     Lipid Panel; Future  -     Hemoglobin A1C; Future  -     Microalbumin/Creatinine Ratio, Urine; Future    History of diabetic retinopathy Mild nonproliferative diabetic retinopathy of both eyes (None on eye exam 11/26/2018)  -     Hemoglobin A1C; Future    Hyperlipidemia associated with type 2 diabetes mellitus  -     Comprehensive Metabolic Panel; Future  -     Lipid Panel; Future    Annual physical exam  -     CBC Auto Differential; Future  -     Comprehensive Metabolic Panel; Future  -     Lipid Panel; Future    Primary insomnia    Other orders  -     metFORMIN (GLUCOPHAGE-XR) 500 MG ER 24hr tablet; Take 2 tablets (1,000 mg total) by mouth 2 (two) times daily.                Care Plan/Goals: Reviewed     Follow up: Follow up in about 6 months (around 11/7/2025).    After  visit summary was printed and given to patient upon discharge today.  Patient goals and care plan are included in After Visit Summary.    This note was generated with the assistance of ambient listening technology. Verbal consent was obtained by the patient and accompanying visitor(s) for the recording of patient appointment to facilitate this note. I attest to having reviewed and edited the generated note for accuracy, though some syntax or spelling errors may persist. Please contact the author of this note for any clarification.            [1]   Patient Active Problem List  Diagnosis    Generalized anxiety disorder    History of diabetic retinopathy Mild nonproliferative diabetic retinopathy of both eyes (None on eye exam 11/26/2018)    Diarrhea    Mixed stress and urge urinary incontinence    Controlled type 2 diabetes mellitus with complication, without long-term current use of insulin    Nuclear sclerosis of both eyes    Diabetes mellitus type 2 without retinopathy    Hyperlipidemia associated with type 2 diabetes mellitus    Primary snoring    Nocturnal hypoxemia    Major depressive disorder, recurrent episode, moderate    Personal history of colonic polyps    Primary insomnia    Tachycardia   [2]   Current Outpatient Medications on File Prior to Visit   Medication Sig Dispense Refill    aspirin (ECOTRIN) 81 MG EC tablet Take 1 tablet (81 mg total) by mouth once daily. 90 tablet 1    buPROPion (WELLBUTRIN XL) 300 MG 24 hr tablet Take 1 tablet (300 mg total) by mouth once daily. 90 tablet 3    levocetirizine (XYZAL) 5 MG tablet Take 1 tablet (5 mg total) by mouth every evening. 30 tablet 11    lysine 500 mg Tab Take 500 mg by mouth once daily.      multivitamin capsule Take 1 capsule by mouth once daily.      oxybutynin (DITROPAN) 5 MG Tab Take 1 tablet (5 mg total) by mouth 2 (two) times daily. 180 tablet 3    pantoprazole (PROTONIX) 40 MG tablet Take 1 tablet (40 mg total) by mouth once daily. 90 tablet 3     PHENAZOPYRIDINE HCL (AZO ORAL) Take 1 tablet by mouth daily as needed.       pravastatin (PRAVACHOL) 40 MG tablet Take 1 tablet (40 mg total) by mouth once daily. 90 tablet 3    traZODone (DESYREL) 50 MG tablet Take 2 tablets (100 mg total) by mouth every evening. 90 tablet 1    [DISCONTINUED] metFORMIN (GLUCOPHAGE-XR) 500 MG ER 24hr tablet Take 2 tablets (1,000 mg total) by mouth 2 (two) times daily. 360 tablet 1    blood sugar diagnostic (BLOOD GLUCOSE TEST) Strp 1 each by Misc.(Non-Drug; Combo Route) route once daily. One touch verio IQ (Patient not taking: Reported on 5/7/2025) 100 each 4    blood-glucose meter kit Use as instructed (Patient not taking: Reported on 5/7/2025) 1 each 0    lancets (ONE TOUCH DELICA LANCETS) 33 gauge Misc 1 lancet by Misc.(Non-Drug; Combo Route) route 2 (two) times daily. (Patient not taking: Reported on 5/7/2025) 200 each 3    montelukast (SINGULAIR) 10 mg tablet 1 tablet in the evening (Patient not taking: Reported on 5/7/2025)      [DISCONTINUED] cholecalciferol, vitamin D3, (VITAMIN D3) 25 mcg (1,000 unit) capsule Take 1,000 Units by mouth once daily. (Patient not taking: Reported on 5/5/2025)      [DISCONTINUED] clonazePAM (KLONOPIN) 0.5 MG tablet PRN severe anxiety. (Patient not taking: Reported on 5/7/2025) 30 tablet 0    [DISCONTINUED] cyanocobalamin (VITAMIN B-12) 1000 MCG tablet Take 100 mcg by mouth once daily. Taking a B-complex (Patient not taking: Reported on 5/5/2025)      [DISCONTINUED] fish oil-omega-3 fatty acids 300-1,000 mg capsule Take 2 g by mouth once daily. (Patient not taking: Reported on 5/5/2025)      [DISCONTINUED] fluticasone propionate (FLONASE) 50 mcg/actuation nasal spray 1 spray in each nostril (Patient not taking: Reported on 5/7/2025) 15.8 mL 2    [DISCONTINUED] nitroGLYCERIN (NITROSTAT) 0.4 MG SL tablet Place 1 tablet (0.4 mg total) under the tongue every 5 (five) minutes as needed for Chest pain. (Patient not taking: Reported on 5/5/2025) 30  tablet 0    [DISCONTINUED] ondansetron (ZOFRAN) 4 MG tablet Take 1 tablet (4 mg total) by mouth every 6 (six) hours as needed for Nausea. (Patient not taking: Reported on 5/5/2025) 20 tablet 0    [DISCONTINUED] sucralfate (CARAFATE) 1 gram tablet TAKE 1 TABLET(1 GRAM) BY MOUTH FOUR TIMES DAILY BEFORE MEALS AND AT NIGHT (Patient not taking: Reported on 5/5/2025) 360 tablet 0     No current facility-administered medications on file prior to visit.

## 2025-05-08 ENCOUNTER — ANESTHESIA (OUTPATIENT)
Dept: ENDOSCOPY | Facility: HOSPITAL | Age: 80
End: 2025-05-08
Payer: MEDICARE

## 2025-05-08 ENCOUNTER — HOSPITAL ENCOUNTER (OUTPATIENT)
Dept: ENDOSCOPY | Facility: HOSPITAL | Age: 80
Discharge: HOME | End: 2025-05-08
Attending: INTERNAL MEDICINE | Admitting: INTERNAL MEDICINE
Payer: MEDICARE

## 2025-05-08 ENCOUNTER — ANESTHESIA EVENT (OUTPATIENT)
Dept: ENDOSCOPY | Facility: HOSPITAL | Age: 80
End: 2025-05-08
Payer: MEDICARE

## 2025-05-08 DIAGNOSIS — K21.9 GASTROESOPHAGEAL REFLUX DISEASE, UNSPECIFIED WHETHER ESOPHAGITIS PRESENT: ICD-10-CM

## 2025-05-08 DIAGNOSIS — R10.13 EPIGASTRIC PAIN: ICD-10-CM

## 2025-05-08 DIAGNOSIS — R07.2 SUBSTERNAL CHEST PAIN: ICD-10-CM

## 2025-05-08 LAB — POCT GLUCOSE: 117 MG/DL (ref 70–110)

## 2025-05-08 PROCEDURE — 63600175 PHARM REV CODE 636 W HCPCS

## 2025-05-08 RX ORDER — SUCRALFATE 1 G/1
1 TABLET ORAL 4 TIMES DAILY
COMMUNITY
End: 2025-05-08

## 2025-05-08 RX ORDER — LIDOCAINE HYDROCHLORIDE 20 MG/ML
INJECTION INTRAVENOUS
Status: DISCONTINUED | OUTPATIENT
Start: 2025-05-08 | End: 2025-05-08

## 2025-05-08 RX ORDER — PROPOFOL 10 MG/ML
VIAL (ML) INTRAVENOUS
Status: DISCONTINUED | OUTPATIENT
Start: 2025-05-08 | End: 2025-05-08

## 2025-05-08 RX ORDER — SUCRALFATE 1 G/1
1 TABLET ORAL 2 TIMES DAILY
Qty: 180 TABLET | Refills: 3 | Status: SHIPPED | OUTPATIENT
Start: 2025-05-08

## 2025-05-08 RX ADMIN — PROPOFOL 80 MG: 10 INJECTION, EMULSION INTRAVENOUS at 08:05

## 2025-05-08 RX ADMIN — PROPOFOL 30 MG: 10 INJECTION, EMULSION INTRAVENOUS at 08:05

## 2025-05-08 RX ADMIN — LIDOCAINE HYDROCHLORIDE 100 MG: 20 INJECTION INTRAVENOUS at 08:05

## 2025-05-08 NOTE — PLAN OF CARE
Dr. Colbert at  to discuss findings. VSS. No  Pain, no GI bleeding. Pt to be discharged from unit.

## 2025-05-08 NOTE — TRANSFER OF CARE
Anesthesia Transfer of Care Note    Patient: Connie Ramos Diamond    Procedure(s) Performed: * No procedures listed *    Patient location: GI    Anesthesia Type: MAC    Transport from OR: Transported from OR on room air with adequate spontaneous ventilation    Post pain: adequate analgesia    Post assessment: no apparent anesthetic complications    Post vital signs: stable    Level of consciousness: responds to stimulation    Nausea/Vomiting: no nausea/vomiting    Complications: none    Transfer of care protocol was followed      Last vitals: Visit Vitals  BP (!) 161/77   Pulse 72   Temp 36.6 °C (97.9 °F)   Resp 18   Wt 59.4 kg (131 lb)   SpO2 95%   Breastfeeding No   BMI 21.80 kg/m²

## 2025-05-08 NOTE — ANESTHESIA PREPROCEDURE EVALUATION
05/08/2025  Connie Fields is a 79 y.o., female.    Problem List[1]  Past Medical History:   Diagnosis Date    Anxiety     Anxiety     Cataract (lens) fragments in eye following cataract surgery, bilateral     DM (diabetes mellitus) 2013     10/21/2018    DM (diabetes mellitus) 2013     am 12/09/2019    DM2 (diabetes mellitus, type 2) 01/13    GERD (gastroesophageal reflux disease)     Hiatal hernia     Mild nonproliferative diabetic retinopathy of both eyes 1/22/15    OS>OD    Mild nonproliferative diabetic retinopathy of both eyes 1/22/2015    OS>OD     Osteoarthritis      Past Surgical History:   Procedure Laterality Date    AUGMENTATION OF BREAST      saline    BREAST SURGERY Bilateral 1992    saline implants    BUNIONECTOMY      CATARACT EXTRACTION W/  INTRAOCULAR LENS IMPLANT Bilateral     12/12/18 OD, 1/9/19 OS, Dr Higuera    CHOLECYSTECTOMY  05/12    COLONOSCOPY  08    1 POLYP    COLONOSCOPY N/A 12/6/2016    Procedure: COLONOSCOPY with biopsies;  Surgeon: Sarah Schmidt MD;  Location: [facility];  Service: Endoscopy;  Laterality: N/A;    COLONOSCOPY N/A 10/19/2022    Procedure: COLONOSCOPY;  Surgeon: Lindsey Gregory MD;  Location: [facility];  Service: Endoscopy;  Laterality: N/A;    ESOPHAGOGASTRODUODENOSCOPY N/A 1/31/2023    Procedure: EGD (ESOPHAGOGASTRODUODENOSCOPY);  Surgeon: Harvinder Head MD;  Location: [facility];  Service: Endoscopy;  Laterality: N/A;    PCIOL  Right 12/12/2018    DR. HIGUERA    TONSILLECTOMY, ADENOIDECTOMY         Pre-op Assessment    I have reviewed the Patient Summary Reports.     I have reviewed the Nursing Notes. I have reviewed the NPO Status.   I have reviewed the Medications.     Review of Systems  Anesthesia Hx:               Denies Personal Hx of Anesthesia complications.                    Social:  Non-Smoker, Alcohol Use        Cardiovascular:                hyperlipidemia                               Pulmonary:         Nocturnal hypoxemia               Hepatic/GI:    Hiatal Hernia, GERD                Musculoskeletal:  Arthritis               Endocrine:  Diabetes           Psych:   anxiety               Results for orders placed or performed during the hospital encounter of 03/18/25   SCHEDULED EKG 12-LEAD (to Muse)    Collection Time: 03/18/25  9:54 AM   Result Value Ref Range    QRS Duration 88 ms    [facility] QTC Calculation 422 ms    Narrative    Test Reason : R00.0,Z00.00,    Vent. Rate :  80 BPM     Atrial Rate :  80 BPM     P-R Int : 212 ms          QRS Dur :  88 ms      QT Int : 366 ms       P-R-T Axes :  40 -37  -2 degrees    QTcB Int : 422 ms    Sinus rhythm with 1st degree A-V block  Left axis deviation  Abnormal R wave progression in the precordial leads  When compared with ECG of 19-Mar-2019 09:13,  Premature atrial complexes are no longer Present  Confirmed by Carson Robles ([id number]) on 3/18/2025 6:02:36 PM    Referred By: JAYLA PEPE           Confirmed By: Carson Robles     Results for orders placed during the hospital encounter of 04/21/25    Echo    Interpretation Summary    Left Ventricle: The left ventricle is normal in size. Normal wall thickness. There is concentric hypertrophy. There is normal systolic function with a visually estimated ejection fraction of 60 - 65%. Ejection fraction is approximately 60%. There is normal diastolic function.    Right Ventricle: The right ventricle is normal in size. Wall thickness is normal. Systolic function is normal.    Pulmonary Artery: The estimated pulmonary artery systolic pressure is 26 mmHg.    IVC/SVC: Normal venous pressure at 3 mmHg.    Results for orders placed during the hospital encounter of 04/21/25    Nuclear Stress - Cardiology Interpreted    Interpretation Summary    Abnormal myocardial perfusion scan.    There is a moderate intensity, medium to large sized, mostly  fixed perfusion abnormality with some reversibilty in the mid to apical anterior and apical wall(s).    There are no other significant perfusion abnormalities.    The gated perfusion images showed an ejection fraction of 63% at rest. The gated perfusion images showed an ejection fraction of 69% post stress. Normal ejection fraction is greater than 59%.    The ECG portion of the study is negative for ischemia.      Physical Exam  General: Well nourished, Cooperative, Alert and Oriented    Airway:  Mallampati: II   Mouth Opening: Normal  TM Distance: Normal  Tongue: Normal  Neck ROM: Normal ROM    Chest/Lungs:  Normal Respiratory Rate    Heart:  Rate: Normal  Rhythm: Regular Rhythm        Anesthesia Plan  Type of Anesthesia, risks & benefits discussed:    Anesthesia Type: MAC, Gen Natural Airway  Intra-op Monitoring Plan: Standard ASA Monitors  Induction:  IV  Informed Consent: Informed consent signed with the Patient and all parties understand the risks and agree with anesthesia plan.  All questions answered.   ASA Score: 2  Day of Surgery Review of History & Physical: H&P Update referred to the surgeon/provider.    Ready For Surgery From Anesthesia Perspective.     .           [1]   Patient Active Problem List  Diagnosis    Generalized anxiety disorder    History of diabetic retinopathy Mild nonproliferative diabetic retinopathy of both eyes (None on eye exam 11/26/2018)    Diarrhea    Mixed stress and urge urinary incontinence    Controlled type 2 diabetes mellitus with complication, without long-term current use of insulin    Nuclear sclerosis of both eyes    Diabetes mellitus type 2 without retinopathy    Hyperlipidemia associated with type 2 diabetes mellitus    Primary snoring    Nocturnal hypoxemia    Major depressive disorder, recurrent episode, moderate    Personal history of colonic polyps    Primary insomnia    Tachycardia

## 2025-05-08 NOTE — ANESTHESIA POSTPROCEDURE EVALUATION
Anesthesia Post Evaluation    Patient: Connie Fields    Procedure(s) Performed: * No procedures listed *    Final Anesthesia Type: MAC      Patient location during evaluation: GI PACU  Patient participation: Yes- Able to Participate  Level of consciousness: awake and alert  Post-procedure vital signs: reviewed and stable  Pain management: adequate  Airway patency: patent    PONV status at discharge: No PONV  Anesthetic complications: no      Cardiovascular status: blood pressure returned to baseline and hemodynamically stable  Respiratory status: unassisted, spontaneous ventilation and room air  Hydration status: euvolemic  Follow-up not needed.              Vitals Value Taken Time   /65 05/08/25 08:59   Temp 37.1 °C (98.8 °F) 05/08/25 08:39   Pulse 69 05/08/25 08:59   Resp 18 05/08/25 08:59   SpO2 95 % 05/08/25 08:59         Event Time   Out of Recovery 09:02:27         Pain/Carmen Score: Carmen Score: 10 (5/8/2025  8:59 AM)

## 2025-05-08 NOTE — BRIEF OP NOTE
Outpatient Endoscopy Brief Operative Note      Admit Date: 5/8/2025    Discharge Date and Time:  5/8/2025 8:38 AM    Attending Physician: Yara Colbert MD     Discharge Physician: Yara Colbert MD     Principal Admitting Diagnoses: Epigastric pain         Discharge Diagnosis: Diagnoses of Gastroesophageal reflux disease, unspecified whether esophagitis present, Epigastric pain, and Substernal chest pain were pertinent to this visit.     Discharged Condition: Good    Indication for Admission: Epigastric pain     Hospital Course: Patient was admitted for an outppatient procedure and tolerated the procedure well with no complications.    Significant Diagnostic Studies: EGD with biopsy    See LUMENS report    Pathology (if any):  Specimen (24h ago, onward)       Start     Ordered    05/08/25 0837  Specimen to Pathology  RELEASE UPON ORDERING        References:    Click here for ordering Quick Tip   Question:  Release to patient  Answer:  Immediate    05/08/25 0837                    Estimated Blood Loss: 1 ml.    Discussed with: patient.    Disposition: Home.    Follow Up/Patient Instructions:   Patient's Medications   New Prescriptions    No medications on file   Previous Medications    ASPIRIN (ECOTRIN) 81 MG EC TABLET    Take 1 tablet (81 mg total) by mouth once daily.    BUPROPION (WELLBUTRIN XL) 300 MG 24 HR TABLET    Take 1 tablet (300 mg total) by mouth once daily.    LEVOCETIRIZINE (XYZAL) 5 MG TABLET    Take 1 tablet (5 mg total) by mouth every evening.    LYSINE 500 MG TAB    Take 500 mg by mouth once daily.    METFORMIN (GLUCOPHAGE-XR) 500 MG ER 24HR TABLET    Take 2 tablets (1,000 mg total) by mouth 2 (two) times daily.    MULTIVITAMIN CAPSULE    Take 1 capsule by mouth once daily.    OXYBUTYNIN (DITROPAN) 5 MG TAB    Take 1 tablet (5 mg total) by mouth 2 (two) times daily.    PANTOPRAZOLE (PROTONIX) 40 MG TABLET    Take 1 tablet (40 mg total) by mouth once daily.    PHENAZOPYRIDINE HCL (AZO  ORAL)    Take 1 tablet by mouth daily as needed.     PRAVASTATIN (PRAVACHOL) 40 MG TABLET    Take 1 tablet (40 mg total) by mouth once daily.    TRAZODONE (DESYREL) 50 MG TABLET    Take 2 tablets (100 mg total) by mouth every evening.   Modified Medications    Modified Medication Previous Medication    SUCRALFATE (CARAFATE) 1 GRAM TABLET sucralfate (CARAFATE) 1 gram tablet       Take 1 tablet (1 g total) by mouth 2 (two) times daily.    Take 1 g by mouth 4 (four) times daily.   Discontinued Medications    BLOOD SUGAR DIAGNOSTIC (BLOOD GLUCOSE TEST) STRP    1 each by Misc.(Non-Drug; Combo Route) route once daily. One touch verio IQ    BLOOD-GLUCOSE METER KIT    Use as instructed    LANCETS (ONE TOUCH DELICA LANCETS) 33 GAUGE MISC    1 lancet by Misc.(Non-Drug; Combo Route) route 2 (two) times daily.    MONTELUKAST (SINGULAIR) 10 MG TABLET    1 tablet in the evening       Discharge Procedure Orders   Diet general     Call MD for:  temperature >100.4     Call MD for:  persistent nausea and vomiting     Call MD for:  severe uncontrolled pain     Call MD for:  difficulty breathing, headache or visual disturbances     Activity as tolerated        Follow-up Information       Edie Garrison NP Follow up.    Specialty: Gastroenterology  Contact information:  [address] [place] [address] [phone]                                 Yara Colbert MD  Inpatient Gastroenterology  [facility]

## 2025-05-08 NOTE — INTERVAL H&P NOTE
"The patient has been examined and the H&P has been reviewed:    I concur with the findings and changes have been noted since the H&P was written: 79 y.o F with hx GERD and previous EGD showing erosive gastropathy recently re-evaluated by Ms. Edie Saacarter for epigastric pain, nausea and reflux. No dysphagia or weight loss. Was taking Aleve but has no switched to Tylenol. Was taking the NSAID for aches and pains. On Protonix 40mg daily. Trial of Carafate 1g tablet QID has improved her symptoms. She "tries" to follow anti-reflux measures which she was reeducated on today.     The risks, benefits and alternatives of the procedure were discussed with the patient in detail. This discussion was had in the presence of endoscopy staff. The risks include, risks of adverse reaction to sedation requiring the use of reversal agents, bleeding requiring blood transfusion, perforation requiring surgical intervention and technical failure. Other risks include aspiration leading to respiratory distress and respiratory failure resulting in endotracheal intubation and mechanical ventilation including death. If anesthesia is being utilized for this procedure, it is up to the anesthesiologist to determine airway safety including elective endotracheal intubation. Questions were answered, they agree to proceed. There was no language barriers.      Active Hospital Problems    Diagnosis  POA    *Epigastric pain [R10.13]  Yes      Resolved Hospital Problems   No resolved problems to display.     "

## 2025-05-09 VITALS
DIASTOLIC BLOOD PRESSURE: 65 MMHG | RESPIRATION RATE: 18 BRPM | OXYGEN SATURATION: 95 % | HEART RATE: 69 BPM | TEMPERATURE: 99 F | WEIGHT: 131 LBS | SYSTOLIC BLOOD PRESSURE: 119 MMHG | BODY MASS INDEX: 21.8 KG/M2

## 2025-05-13 ENCOUNTER — RESULTS FOLLOW-UP (OUTPATIENT)
Dept: GASTROENTEROLOGY | Facility: HOSPITAL | Age: 80
End: 2025-05-13
Payer: MEDICARE

## 2025-05-14 ENCOUNTER — PATIENT MESSAGE (OUTPATIENT)
Dept: GASTROENTEROLOGY | Facility: CLINIC | Age: 80
End: 2025-05-14
Payer: MEDICARE

## 2025-06-24 ENCOUNTER — LAB VISIT (OUTPATIENT)
Dept: LAB | Facility: HOSPITAL | Age: 80
End: 2025-06-24
Attending: INTERNAL MEDICINE
Payer: MEDICARE

## 2025-06-24 ENCOUNTER — OFFICE VISIT (OUTPATIENT)
Dept: CARDIOLOGY | Facility: CLINIC | Age: 80
End: 2025-06-24
Payer: MEDICARE

## 2025-06-24 VITALS
HEIGHT: 65 IN | SYSTOLIC BLOOD PRESSURE: 115 MMHG | BODY MASS INDEX: 22.96 KG/M2 | HEART RATE: 64 BPM | RESPIRATION RATE: 16 BRPM | WEIGHT: 137.81 LBS | DIASTOLIC BLOOD PRESSURE: 72 MMHG | OXYGEN SATURATION: 96 %

## 2025-06-24 DIAGNOSIS — E78.5 HYPERLIPIDEMIA ASSOCIATED WITH TYPE 2 DIABETES MELLITUS: ICD-10-CM

## 2025-06-24 DIAGNOSIS — R94.39 ABNORMAL NUCLEAR STRESS TEST: Primary | ICD-10-CM

## 2025-06-24 DIAGNOSIS — R94.39 ABNORMAL NUCLEAR STRESS TEST: ICD-10-CM

## 2025-06-24 DIAGNOSIS — R94.31 ABNORMAL ECG: ICD-10-CM

## 2025-06-24 DIAGNOSIS — E11.69 HYPERLIPIDEMIA ASSOCIATED WITH TYPE 2 DIABETES MELLITUS: ICD-10-CM

## 2025-06-24 DIAGNOSIS — R07.9 CHEST PAIN, UNSPECIFIED TYPE: ICD-10-CM

## 2025-06-24 DIAGNOSIS — F41.1 GENERALIZED ANXIETY DISORDER: ICD-10-CM

## 2025-06-24 DIAGNOSIS — E11.8 CONTROLLED TYPE 2 DIABETES MELLITUS WITH COMPLICATION, WITHOUT LONG-TERM CURRENT USE OF INSULIN: ICD-10-CM

## 2025-06-24 DIAGNOSIS — Z82.49 FAMILY HISTORY OF CARDIAC DISORDER: ICD-10-CM

## 2025-06-24 DIAGNOSIS — R00.0 TACHYCARDIA: ICD-10-CM

## 2025-06-24 LAB
ABSOLUTE EOSINOPHIL (OHS): 0.14 K/UL
ABSOLUTE MONOCYTE (OHS): 0.61 K/UL (ref 0.3–1)
ABSOLUTE NEUTROPHIL COUNT (OHS): 5.38 K/UL (ref 1.8–7.7)
ANION GAP (OHS): 10 MMOL/L (ref 8–16)
BASOPHILS # BLD AUTO: 0.05 K/UL
BASOPHILS NFR BLD AUTO: 0.6 %
BUN SERPL-MCNC: 13 MG/DL (ref 8–23)
CALCIUM SERPL-MCNC: 9.5 MG/DL (ref 8.7–10.5)
CHLORIDE SERPL-SCNC: 104 MMOL/L (ref 95–110)
CO2 SERPL-SCNC: 26 MMOL/L (ref 23–29)
CREAT SERPL-MCNC: 0.7 MG/DL (ref 0.5–1.4)
ERYTHROCYTE [DISTWIDTH] IN BLOOD BY AUTOMATED COUNT: 12.7 % (ref 11.5–14.5)
GFR SERPLBLD CREATININE-BSD FMLA CKD-EPI: >60 ML/MIN/1.73/M2
GLUCOSE SERPL-MCNC: 152 MG/DL (ref 70–110)
HCT VFR BLD AUTO: 41.5 % (ref 37–48.5)
HGB BLD-MCNC: 13.6 GM/DL (ref 12–16)
IMM GRANULOCYTES # BLD AUTO: 0.03 K/UL (ref 0–0.04)
IMM GRANULOCYTES NFR BLD AUTO: 0.4 % (ref 0–0.5)
LYMPHOCYTES # BLD AUTO: 2.34 K/UL (ref 1–4.8)
MCH RBC QN AUTO: 27.8 PG (ref 27–31)
MCHC RBC AUTO-ENTMCNC: 32.8 G/DL (ref 32–36)
MCV RBC AUTO: 85 FL (ref 82–98)
NUCLEATED RBC (/100WBC) (OHS): 0 /100 WBC
PLATELET # BLD AUTO: 301 K/UL (ref 150–450)
PMV BLD AUTO: 9.7 FL (ref 9.2–12.9)
POTASSIUM SERPL-SCNC: 4.1 MMOL/L (ref 3.5–5.1)
RBC # BLD AUTO: 4.9 M/UL (ref 4–5.4)
RELATIVE EOSINOPHIL (OHS): 1.6 %
RELATIVE LYMPHOCYTE (OHS): 27.4 % (ref 18–48)
RELATIVE MONOCYTE (OHS): 7.1 % (ref 4–15)
RELATIVE NEUTROPHIL (OHS): 62.9 % (ref 38–73)
SODIUM SERPL-SCNC: 140 MMOL/L (ref 136–145)
WBC # BLD AUTO: 8.55 K/UL (ref 3.9–12.7)

## 2025-06-24 PROCEDURE — 82310 ASSAY OF CALCIUM: CPT

## 2025-06-24 PROCEDURE — 1160F RVW MEDS BY RX/DR IN RCRD: CPT | Mod: CPTII,S$GLB,, | Performed by: INTERNAL MEDICINE

## 2025-06-24 PROCEDURE — 85730 THROMBOPLASTIN TIME PARTIAL: CPT

## 2025-06-24 PROCEDURE — 99215 OFFICE O/P EST HI 40 MIN: CPT | Mod: S$GLB,,, | Performed by: INTERNAL MEDICINE

## 2025-06-24 PROCEDURE — 85610 PROTHROMBIN TIME: CPT

## 2025-06-24 PROCEDURE — G2211 COMPLEX E/M VISIT ADD ON: HCPCS | Mod: S$GLB,,, | Performed by: INTERNAL MEDICINE

## 2025-06-24 PROCEDURE — 36415 COLL VENOUS BLD VENIPUNCTURE: CPT

## 2025-06-24 PROCEDURE — 3072F LOW RISK FOR RETINOPATHY: CPT | Mod: CPTII,S$GLB,, | Performed by: INTERNAL MEDICINE

## 2025-06-24 PROCEDURE — 1159F MED LIST DOCD IN RCRD: CPT | Mod: CPTII,S$GLB,, | Performed by: INTERNAL MEDICINE

## 2025-06-24 PROCEDURE — 3074F SYST BP LT 130 MM HG: CPT | Mod: CPTII,S$GLB,, | Performed by: INTERNAL MEDICINE

## 2025-06-24 PROCEDURE — 3288F FALL RISK ASSESSMENT DOCD: CPT | Mod: CPTII,S$GLB,, | Performed by: INTERNAL MEDICINE

## 2025-06-24 PROCEDURE — 99999 PR PBB SHADOW E&M-EST. PATIENT-LVL IV: CPT | Mod: PBBFAC,,, | Performed by: INTERNAL MEDICINE

## 2025-06-24 PROCEDURE — 1101F PT FALLS ASSESS-DOCD LE1/YR: CPT | Mod: CPTII,S$GLB,, | Performed by: INTERNAL MEDICINE

## 2025-06-24 PROCEDURE — 85025 COMPLETE CBC W/AUTO DIFF WBC: CPT

## 2025-06-24 PROCEDURE — 3078F DIAST BP <80 MM HG: CPT | Mod: CPTII,S$GLB,, | Performed by: INTERNAL MEDICINE

## 2025-06-24 RX ORDER — NITROGLYCERIN 0.4 MG/1
0.4 TABLET SUBLINGUAL EVERY 5 MIN PRN
Qty: 30 TABLET | Refills: 0 | Status: SHIPPED | OUTPATIENT
Start: 2025-06-24 | End: 2026-06-24

## 2025-06-24 NOTE — PROGRESS NOTES
Subjective:   Patient ID:  Connie Fields is a 79 y.o. female who presents for cardiac consult of No chief complaint on file.      Referral by: No referring provider defined for this encounter.     Reason for consult: tachycardia, palpitations       HPI  The patient came in today for cardiac consult of No chief complaint on file.      Connie Fields is a 79 y.o. female pt with HLD, DM2, nocturnal hypoxemia, snoring, KATHERINE, depression presents for follow up CV eval.     3/18/25  Per recent visit at PCP office - She reports episodes of elevated heart rate, even while sitting still, with rates increasing to 120-130 BPM, and sometimes up to 140 BPM. She has not previously been evaluated by a cardiologist for this issue.   BP and HR stable. 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   ECG - NSR, 1st deg AVB, LAD, poor RWP     25  Nuc stress 2025 with moderate intensity, medium to large sized, mostly fixed perfusion abnormality with some reversibilty in the mid to apical anterior and apical wall(s).  ECHO 2025 with normal bi V function and valves.   Vital monitor 2025 negative.     Told pt regarding abnormal stress - results of nuclear stress test reveal an area of mild to moderate plaque along with scar - will continue medical management - take aspirin 81 mg daily it not already doing so, need to monitor BP and pulse - if any severe chest pain/angina - recommend taking nitroglycerin - I have sent to local pharmacy.     BP and HR stable.   Has not need NTG.       FH - mother -  of MI, father - had CVD, brother  of MI     Results for orders placed during the hospital encounter of 25    Echo    Interpretation Summary    Left Ventricle: The left ventricle is normal in size. Normal wall thickness. There is concentric hypertrophy. There is normal systolic function with a visually  estimated ejection fraction of 60 - 65%. Ejection fraction is approximately 60%. There is normal diastolic function.    Right Ventricle: The right ventricle is normal in size. Wall thickness is normal. Systolic function is normal.    Pulmonary Artery: The estimated pulmonary artery systolic pressure is 26 mmHg.    IVC/SVC: Normal venous pressure at 3 mmHg.      Results for orders placed during the hospital encounter of 04/21/25    Nuclear Stress - Cardiology Interpreted    Interpretation Summary    Abnormal myocardial perfusion scan.    There is a moderate intensity, medium to large sized, mostly fixed perfusion abnormality with some reversibilty in the mid to apical anterior and apical wall(s).    There are no other significant perfusion abnormalities.    The gated perfusion images showed an ejection fraction of 63% at rest. The gated perfusion images showed an ejection fraction of 69% post stress. Normal ejection fraction is greater than 59%.    The ECG portion of the study is negative for ischemia.        Cardiac Monitor - 3-15 Day Adult (Cupid Only)  Result Date: 5/5/2025    The predominant rhythm is sinus.    The patient was monitored for a total of 6d 14h, underlying rhythm is   Sinus.  The minimum heart rate was 63 bpm; the maximum 149 bpm; the average 81   bpm.  0 % of Atrial fibrillation/Atrial flutter with longest episode of 0 ms.  The total burden of AV Block present was 0 % [Complete Heart Block: 0 %;   Advanced (High Grade):  0 %; 2nd Degree, Mobitz II: 0 %; 2nd Degree, Mobitz I: 0 %].  There were 0 pauses, the longest pause was 0 ms at --.  Total count of Ventricular Tachycardia (VT): 0 episode(s). Longest VT: 0 s   on --. Fastest Ventricular Run: -- bpm  on --. Total Count of Ventricular Episodes <100bpm: 0 episode(s). Longest   Ventricular Event <100bpm: 0 s  on --  9 supraventricular episodes were found. Longest SVT Episode 22 beats,   Fastest  bpm  There were a total of 2568 PVCs with 2  morphologies and 5 couplets.   Overall PVC [place] at 0.33 %  There were a total of 0 Other Beats. There were 0 total number of paced   beats.  There were a total of 1046 PSVCs with 23 couplets. Overall PSVC [place] at  0.14 %  There is a total of 0 patient events.               Past Medical History:   Diagnosis Date    Anxiety     Anxiety     Cataract (lens) fragments in eye following cataract surgery, bilateral     DM (diabetes mellitus) 2013     10/21/2018    DM (diabetes mellitus) 2013     am 12/09/2019    DM2 (diabetes mellitus, type 2) 01/13    GERD (gastroesophageal reflux disease)     Hiatal hernia     Mild nonproliferative diabetic retinopathy of both eyes 1/22/15    OS>OD    Mild nonproliferative diabetic retinopathy of both eyes 1/22/2015    OS>OD     Osteoarthritis        Past Surgical History:   Procedure Laterality Date    AUGMENTATION OF BREAST      saline    BREAST SURGERY Bilateral 1992    saline implants    BUNIONECTOMY      CATARACT EXTRACTION W/  INTRAOCULAR LENS IMPLANT Bilateral     12/12/18 OD, 1/9/19 OS, Dr Higuera    CHOLECYSTECTOMY  05/12    COLONOSCOPY  08    1 POLYP    COLONOSCOPY N/A 12/6/2016    Procedure: COLONOSCOPY with biopsies;  Surgeon: Sarah Schmidt MD;  Location: [facility];  Service: Endoscopy;  Laterality: N/A;    COLONOSCOPY N/A 10/19/2022    Procedure: COLONOSCOPY;  Surgeon: Lindsey Gregory MD;  Location: [facility];  Service: Endoscopy;  Laterality: N/A;    ESOPHAGOGASTRODUODENOSCOPY N/A 1/31/2023    Procedure: EGD (ESOPHAGOGASTRODUODENOSCOPY);  Surgeon: Harvinder Head MD;  Location: [facility];  Service: Endoscopy;  Laterality: N/A;    PCIOL  Right 12/12/2018    DR. HIGUERA    TONSILLECTOMY, ADENOIDECTOMY         Social History[1]    Family History   Problem Relation Name Age of Onset    Heart failure Father      Suicide Father      Diabetes Mother      Heart failure Mother      Hypertension Mother      Stroke Mother      Heart failure Brother       Cancer Brother          full body    Hypertension Brother      Coronary artery disease Brother      Diabetes Maternal Grandmother      Hypertension Son      Suicide Son         Patient's Medications   New Prescriptions    NITROGLYCERIN (NITROSTAT) 0.4 MG SL TABLET    Place 1 tablet (0.4 mg total) under the tongue every 5 (five) minutes as needed for Chest pain.   Previous Medications    ASPIRIN (ECOTRIN) 81 MG EC TABLET    Take 1 tablet (81 mg total) by mouth once daily.    BUPROPION (WELLBUTRIN XL) 300 MG 24 HR TABLET    Take 1 tablet (300 mg total) by mouth once daily.    LEVOCETIRIZINE (XYZAL) 5 MG TABLET    Take 1 tablet (5 mg total) by mouth every evening.    LYSINE 500 MG TAB    Take 500 mg by mouth once daily.    METFORMIN (GLUCOPHAGE-XR) 500 MG ER 24HR TABLET    Take 2 tablets (1,000 mg total) by mouth 2 (two) times daily.    MULTIVITAMIN CAPSULE    Take 1 capsule by mouth once daily.    OXYBUTYNIN (DITROPAN) 5 MG TAB    Take 1 tablet (5 mg total) by mouth 2 (two) times daily.    PANTOPRAZOLE (PROTONIX) 40 MG TABLET    Take 1 tablet (40 mg total) by mouth once daily.    PHENAZOPYRIDINE HCL (AZO ORAL)    Take 1 tablet by mouth daily as needed.     PRAVASTATIN (PRAVACHOL) 40 MG TABLET    Take 1 tablet (40 mg total) by mouth once daily.    SUCRALFATE (CARAFATE) 1 GRAM TABLET    Take 1 tablet (1 g total) by mouth 2 (two) times daily.    TRAZODONE (DESYREL) 50 MG TABLET    Take 2 tablets (100 mg total) by mouth every evening.   Modified Medications    No medications on file   Discontinued Medications    No medications on file       Review of Systems   Constitutional:  Positive for malaise/fatigue.   HENT: Negative.     Eyes: Negative.    Respiratory: Negative.     Cardiovascular:  Positive for palpitations.   Gastrointestinal: Negative.    Genitourinary: Negative.    Musculoskeletal: Negative.    Skin: Negative.    Neurological: Negative.    Endo/Heme/Allergies: Negative.    Psychiatric/Behavioral: Negative.    "  All 12 systems otherwise negative.      Wt Readings from Last 3 Encounters:   06/24/25 62.5 kg (137 lb 12.6 oz)   05/08/25 59.4 kg (131 lb)   05/07/25 63.2 kg (139 lb 5.3 oz)     Temp Readings from Last 3 Encounters:   05/08/25 98.8 °F (37.1 °C) (Temporal)   05/07/25 96.7 °F (35.9 °C) (Tympanic)   11/14/24 96.8 °F (36 °C) (Tympanic)     BP Readings from Last 3 Encounters:   06/24/25 115/72   05/08/25 119/65   05/07/25 114/66     Pulse Readings from Last 3 Encounters:   06/24/25 64   05/08/25 69   05/07/25 71       /72   Pulse 64   Resp 16   Ht 5' 5" (1.651 m)   Wt 62.5 kg (137 lb 12.6 oz)   SpO2 96%   BMI 22.93 kg/m²     Objective:   Physical Exam  Vitals and nursing note reviewed.   Constitutional:       General: She is not in acute distress.     Appearance: She is well-developed. She is not diaphoretic.   HENT:      Head: Normocephalic and atraumatic.      Nose: Nose normal.   Eyes:      General: No scleral icterus.     Conjunctiva/sclera: Conjunctivae normal.   Neck:      Thyroid: No thyromegaly.      Vascular: No JVD.   Cardiovascular:      Rate and Rhythm: Normal rate and regular rhythm.      Heart sounds: S1 normal and S2 normal. No murmur heard.     No friction rub. No gallop. No S3 or S4 sounds.   Pulmonary:      Effort: Pulmonary effort is normal. No respiratory distress.      Breath sounds: Normal breath sounds. No stridor. No wheezing or rales.   Chest:      Chest wall: No tenderness.   Abdominal:      General: Bowel sounds are normal. There is no distension.      Palpations: Abdomen is soft. There is no mass.      Tenderness: There is no abdominal tenderness. There is no rebound.   Genitourinary:     Comments: Deferred  Musculoskeletal:         General: No tenderness or deformity. Normal range of motion.      Cervical back: Normal range of motion and neck supple.   Lymphadenopathy:      Cervical: No cervical adenopathy.   Skin:     General: Skin is warm and dry.      Coloration: Skin is not " "pale.      Findings: No erythema or rash.   Neurological:      Mental Status: She is alert and oriented to person, place, and time.      Motor: No abnormal muscle tone.      Coordination: Coordination normal.   Psychiatric:         Behavior: Behavior normal.         Thought Content: Thought content normal.         Judgment: Judgment normal.         Lab Results   Component Value Date     05/05/2025     11/11/2024    K 4.4 05/05/2025    K 4.1 11/11/2024     05/05/2025     11/11/2024    CO2 27 05/05/2025    CO2 28 11/11/2024    BUN 13 05/05/2025    CREATININE 0.7 05/05/2025     (H) 05/05/2025    GLU 92 11/11/2024    HGBA1C 5.6 05/05/2025    HGBA1C 5.6 11/11/2024    AST 16 05/05/2025    AST 20 11/11/2024    ALT 16 05/05/2025    ALT 16 11/11/2024    ALBUMIN 3.8 05/05/2025    ALBUMIN 3.9 11/11/2024    PROT 6.2 05/05/2025    PROT 6.4 11/11/2024    BILITOT 0.8 05/05/2025    BILITOT 0.9 11/11/2024    WBC 5.60 08/19/2022    HGB 13.4 08/19/2022    HCT 39.9 08/19/2022    MCV 85 08/19/2022     08/19/2022    CHOL 148 11/11/2024    HDL 51 11/11/2024    LDLCALC 78.0 11/11/2024    TRIG 95 11/11/2024         No results found for: "BNP", "INR"       Assessment:      1. Abnormal nuclear stress test    2. Controlled type 2 diabetes mellitus with complication, without long-term current use of insulin    3. Tachycardia    4. Hyperlipidemia associated with type 2 diabetes mellitus    5. Generalized anxiety disorder    6. Abnormal ECG    7. Family history of cardiac disorder    8. Chest pain, unspecified type          Plan:   Tachycardia ,; FH CVD , palpitations ; abnormal nuclear stress   -Nuc stress 4/2025 with moderate intensity, medium to large sized, mostly fixed perfusion abnormality with some reversibilty in the mid to apical anterior and apical -wall(s).  -ECHO 4/2025 with normal bi V function and valves.   Vital monitor 4/2025 negative.   - cont asa, NTG PRN  - for LHC - will schedule in July " if any CP/SOB needs further eval asap    2. HLD  - cont statin    3. KATHERINE, depression  - cont tx per PCP    4. DM2  - cont tx - on Metformin    5. GERD  - cont PPI    Visit today included increased complexity associated with the care of the episodic problem tachycardia addressed and managing the longitudinal care of the patient due to the serious and/or complex managed problem(s) .      Thank you for allowing me to participate in this patient's care. Please do not hesitate to contact me with any questions or concerns. Consult note has been forwarded to the referral physician.            [1]   Social History  Tobacco Use    Smoking status: Never     Passive exposure: Never    Smokeless tobacco: Never   Substance Use Topics    Alcohol use: Yes     Comment: occasionally    Drug use: No

## 2025-06-24 NOTE — H&P (VIEW-ONLY)
Subjective:   Patient ID:  Connie Fields is a 79 y.o. female who presents for cardiac consult of No chief complaint on file.      Referral by: No referring provider defined for this encounter.     Reason for consult: tachycardia, palpitations       HPI  The patient came in today for cardiac consult of No chief complaint on file.      Connie Fields is a 79 y.o. female pt with HLD, DM2, nocturnal hypoxemia, snoring, KATHERINE, depression presents for follow up CV eval.     3/18/25  Per recent visit at PCP office - She reports episodes of elevated heart rate, even while sitting still, with rates increasing to 120-130 BPM, and sometimes up to 140 BPM. She has not previously been evaluated by a cardiologist for this issue.   BP and HR stable. 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   ECG - NSR, 1st deg AVB, LAD, poor RWP     25  Nuc stress 2025 with moderate intensity, medium to large sized, mostly fixed perfusion abnormality with some reversibilty in the mid to apical anterior and apical wall(s).  ECHO 2025 with normal bi V function and valves.   Vital monitor 2025 negative.     Told pt regarding abnormal stress - results of nuclear stress test reveal an area of mild to moderate plaque along with scar - will continue medical management - take aspirin 81 mg daily it not already doing so, need to monitor BP and pulse - if any severe chest pain/angina - recommend taking nitroglycerin - I have sent to local pharmacy.     BP and HR stable.   Has not need NTG.       FH - mother -  of MI, father - had CVD, brother  of MI     Results for orders placed during the hospital encounter of 25    Echo    Interpretation Summary    Left Ventricle: The left ventricle is normal in size. Normal wall thickness. There is concentric hypertrophy. There is normal systolic function with a visually  estimated ejection fraction of 60 - 65%. Ejection fraction is approximately 60%. There is normal diastolic function.    Right Ventricle: The right ventricle is normal in size. Wall thickness is normal. Systolic function is normal.    Pulmonary Artery: The estimated pulmonary artery systolic pressure is 26 mmHg.    IVC/SVC: Normal venous pressure at 3 mmHg.      Results for orders placed during the hospital encounter of 04/21/25    Nuclear Stress - Cardiology Interpreted    Interpretation Summary    Abnormal myocardial perfusion scan.    There is a moderate intensity, medium to large sized, mostly fixed perfusion abnormality with some reversibilty in the mid to apical anterior and apical wall(s).    There are no other significant perfusion abnormalities.    The gated perfusion images showed an ejection fraction of 63% at rest. The gated perfusion images showed an ejection fraction of 69% post stress. Normal ejection fraction is greater than 59%.    The ECG portion of the study is negative for ischemia.        Cardiac Monitor - 3-15 Day Adult (Cupid Only)  Result Date: 5/5/2025    The predominant rhythm is sinus.    The patient was monitored for a total of 6d 14h, underlying rhythm is   Sinus.  The minimum heart rate was 63 bpm; the maximum 149 bpm; the average 81   bpm.  0 % of Atrial fibrillation/Atrial flutter with longest episode of 0 ms.  The total burden of AV Block present was 0 % [Complete Heart Block: 0 %;   Advanced (High Grade):  0 %; 2nd Degree, Mobitz II: 0 %; 2nd Degree, Mobitz I: 0 %].  There were 0 pauses, the longest pause was 0 ms at --.  Total count of Ventricular Tachycardia (VT): 0 episode(s). Longest VT: 0 s   on --. Fastest Ventricular Run: -- bpm  on --. Total Count of Ventricular Episodes <100bpm: 0 episode(s). Longest   Ventricular Event <100bpm: 0 s  on --  9 supraventricular episodes were found. Longest SVT Episode 22 beats,   Fastest  bpm  There were a total of 2568 PVCs with 2  morphologies and 5 couplets.   Overall PVC [place] at 0.33 %  There were a total of 0 Other Beats. There were 0 total number of paced   beats.  There were a total of 1046 PSVCs with 23 couplets. Overall PSVC [place] at  0.14 %  There is a total of 0 patient events.               Past Medical History:   Diagnosis Date    Anxiety     Anxiety     Cataract (lens) fragments in eye following cataract surgery, bilateral     DM (diabetes mellitus) 2013     10/21/2018    DM (diabetes mellitus) 2013     am 12/09/2019    DM2 (diabetes mellitus, type 2) 01/13    GERD (gastroesophageal reflux disease)     Hiatal hernia     Mild nonproliferative diabetic retinopathy of both eyes 1/22/15    OS>OD    Mild nonproliferative diabetic retinopathy of both eyes 1/22/2015    OS>OD     Osteoarthritis        Past Surgical History:   Procedure Laterality Date    AUGMENTATION OF BREAST      saline    BREAST SURGERY Bilateral 1992    saline implants    BUNIONECTOMY      CATARACT EXTRACTION W/  INTRAOCULAR LENS IMPLANT Bilateral     12/12/18 OD, 1/9/19 OS, Dr Higuera    CHOLECYSTECTOMY  05/12    COLONOSCOPY  08    1 POLYP    COLONOSCOPY N/A 12/6/2016    Procedure: COLONOSCOPY with biopsies;  Surgeon: Sarah Schmidt MD;  Location: [facility];  Service: Endoscopy;  Laterality: N/A;    COLONOSCOPY N/A 10/19/2022    Procedure: COLONOSCOPY;  Surgeon: Lindsey Gregory MD;  Location: [facility];  Service: Endoscopy;  Laterality: N/A;    ESOPHAGOGASTRODUODENOSCOPY N/A 1/31/2023    Procedure: EGD (ESOPHAGOGASTRODUODENOSCOPY);  Surgeon: Harvinder Head MD;  Location: [facility];  Service: Endoscopy;  Laterality: N/A;    PCIOL  Right 12/12/2018    DR. HIGUERA    TONSILLECTOMY, ADENOIDECTOMY         Social History[1]    Family History   Problem Relation Name Age of Onset    Heart failure Father      Suicide Father      Diabetes Mother      Heart failure Mother      Hypertension Mother      Stroke Mother      Heart failure Brother       Cancer Brother          full body    Hypertension Brother      Coronary artery disease Brother      Diabetes Maternal Grandmother      Hypertension Son      Suicide Son         Patient's Medications   New Prescriptions    NITROGLYCERIN (NITROSTAT) 0.4 MG SL TABLET    Place 1 tablet (0.4 mg total) under the tongue every 5 (five) minutes as needed for Chest pain.   Previous Medications    ASPIRIN (ECOTRIN) 81 MG EC TABLET    Take 1 tablet (81 mg total) by mouth once daily.    BUPROPION (WELLBUTRIN XL) 300 MG 24 HR TABLET    Take 1 tablet (300 mg total) by mouth once daily.    LEVOCETIRIZINE (XYZAL) 5 MG TABLET    Take 1 tablet (5 mg total) by mouth every evening.    LYSINE 500 MG TAB    Take 500 mg by mouth once daily.    METFORMIN (GLUCOPHAGE-XR) 500 MG ER 24HR TABLET    Take 2 tablets (1,000 mg total) by mouth 2 (two) times daily.    MULTIVITAMIN CAPSULE    Take 1 capsule by mouth once daily.    OXYBUTYNIN (DITROPAN) 5 MG TAB    Take 1 tablet (5 mg total) by mouth 2 (two) times daily.    PANTOPRAZOLE (PROTONIX) 40 MG TABLET    Take 1 tablet (40 mg total) by mouth once daily.    PHENAZOPYRIDINE HCL (AZO ORAL)    Take 1 tablet by mouth daily as needed.     PRAVASTATIN (PRAVACHOL) 40 MG TABLET    Take 1 tablet (40 mg total) by mouth once daily.    SUCRALFATE (CARAFATE) 1 GRAM TABLET    Take 1 tablet (1 g total) by mouth 2 (two) times daily.    TRAZODONE (DESYREL) 50 MG TABLET    Take 2 tablets (100 mg total) by mouth every evening.   Modified Medications    No medications on file   Discontinued Medications    No medications on file       Review of Systems   Constitutional:  Positive for malaise/fatigue.   HENT: Negative.     Eyes: Negative.    Respiratory: Negative.     Cardiovascular:  Positive for palpitations.   Gastrointestinal: Negative.    Genitourinary: Negative.    Musculoskeletal: Negative.    Skin: Negative.    Neurological: Negative.    Endo/Heme/Allergies: Negative.    Psychiatric/Behavioral: Negative.    "  All 12 systems otherwise negative.      Wt Readings from Last 3 Encounters:   06/24/25 62.5 kg (137 lb 12.6 oz)   05/08/25 59.4 kg (131 lb)   05/07/25 63.2 kg (139 lb 5.3 oz)     Temp Readings from Last 3 Encounters:   05/08/25 98.8 °F (37.1 °C) (Temporal)   05/07/25 96.7 °F (35.9 °C) (Tympanic)   11/14/24 96.8 °F (36 °C) (Tympanic)     BP Readings from Last 3 Encounters:   06/24/25 115/72   05/08/25 119/65   05/07/25 114/66     Pulse Readings from Last 3 Encounters:   06/24/25 64   05/08/25 69   05/07/25 71       /72   Pulse 64   Resp 16   Ht 5' 5" (1.651 m)   Wt 62.5 kg (137 lb 12.6 oz)   SpO2 96%   BMI 22.93 kg/m²     Objective:   Physical Exam  Vitals and nursing note reviewed.   Constitutional:       General: She is not in acute distress.     Appearance: She is well-developed. She is not diaphoretic.   HENT:      Head: Normocephalic and atraumatic.      Nose: Nose normal.   Eyes:      General: No scleral icterus.     Conjunctiva/sclera: Conjunctivae normal.   Neck:      Thyroid: No thyromegaly.      Vascular: No JVD.   Cardiovascular:      Rate and Rhythm: Normal rate and regular rhythm.      Heart sounds: S1 normal and S2 normal. No murmur heard.     No friction rub. No gallop. No S3 or S4 sounds.   Pulmonary:      Effort: Pulmonary effort is normal. No respiratory distress.      Breath sounds: Normal breath sounds. No stridor. No wheezing or rales.   Chest:      Chest wall: No tenderness.   Abdominal:      General: Bowel sounds are normal. There is no distension.      Palpations: Abdomen is soft. There is no mass.      Tenderness: There is no abdominal tenderness. There is no rebound.   Genitourinary:     Comments: Deferred  Musculoskeletal:         General: No tenderness or deformity. Normal range of motion.      Cervical back: Normal range of motion and neck supple.   Lymphadenopathy:      Cervical: No cervical adenopathy.   Skin:     General: Skin is warm and dry.      Coloration: Skin is not " "pale.      Findings: No erythema or rash.   Neurological:      Mental Status: She is alert and oriented to person, place, and time.      Motor: No abnormal muscle tone.      Coordination: Coordination normal.   Psychiatric:         Behavior: Behavior normal.         Thought Content: Thought content normal.         Judgment: Judgment normal.         Lab Results   Component Value Date     05/05/2025     11/11/2024    K 4.4 05/05/2025    K 4.1 11/11/2024     05/05/2025     11/11/2024    CO2 27 05/05/2025    CO2 28 11/11/2024    BUN 13 05/05/2025    CREATININE 0.7 05/05/2025     (H) 05/05/2025    GLU 92 11/11/2024    HGBA1C 5.6 05/05/2025    HGBA1C 5.6 11/11/2024    AST 16 05/05/2025    AST 20 11/11/2024    ALT 16 05/05/2025    ALT 16 11/11/2024    ALBUMIN 3.8 05/05/2025    ALBUMIN 3.9 11/11/2024    PROT 6.2 05/05/2025    PROT 6.4 11/11/2024    BILITOT 0.8 05/05/2025    BILITOT 0.9 11/11/2024    WBC 5.60 08/19/2022    HGB 13.4 08/19/2022    HCT 39.9 08/19/2022    MCV 85 08/19/2022     08/19/2022    CHOL 148 11/11/2024    HDL 51 11/11/2024    LDLCALC 78.0 11/11/2024    TRIG 95 11/11/2024         No results found for: "BNP", "INR"       Assessment:      1. Abnormal nuclear stress test    2. Controlled type 2 diabetes mellitus with complication, without long-term current use of insulin    3. Tachycardia    4. Hyperlipidemia associated with type 2 diabetes mellitus    5. Generalized anxiety disorder    6. Abnormal ECG    7. Family history of cardiac disorder    8. Chest pain, unspecified type          Plan:   Tachycardia ,; FH CVD , palpitations ; abnormal nuclear stress   -Nuc stress 4/2025 with moderate intensity, medium to large sized, mostly fixed perfusion abnormality with some reversibilty in the mid to apical anterior and apical -wall(s).  -ECHO 4/2025 with normal bi V function and valves.   Vital monitor 4/2025 negative.   - cont asa, NTG PRN  - for LHC - will schedule in July " if any CP/SOB needs further eval asap    2. HLD  - cont statin    3. KATHERINE, depression  - cont tx per PCP    4. DM2  - cont tx - on Metformin    5. GERD  - cont PPI    Visit today included increased complexity associated with the care of the episodic problem tachycardia addressed and managing the longitudinal care of the patient due to the serious and/or complex managed problem(s) .      Thank you for allowing me to participate in this patient's care. Please do not hesitate to contact me with any questions or concerns. Consult note has been forwarded to the referral physician.            [1]   Social History  Tobacco Use    Smoking status: Never     Passive exposure: Never    Smokeless tobacco: Never   Substance Use Topics    Alcohol use: Yes     Comment: occasionally    Drug use: No

## 2025-06-25 LAB
APTT PPP: 25.1 SECONDS (ref 21–32)
INR PPP: 1 (ref 0.8–1.2)
PROTHROMBIN TIME: 10.7 SECONDS (ref 9–12.5)

## 2025-07-14 ENCOUNTER — TELEPHONE (OUTPATIENT)
Dept: GASTROENTEROLOGY | Facility: CLINIC | Age: 80
End: 2025-07-14
Payer: MEDICARE

## 2025-07-14 PROBLEM — R94.39 ABNORMAL NUCLEAR STRESS TEST: Status: ACTIVE | Noted: 2025-07-14

## 2025-07-14 NOTE — TELEPHONE ENCOUNTER
Returned pts call. She states she is having heartburn and nausea also has some weight loss.   Patient had an EGD May 8, 2025 and was instructed to take Carafate for a month then take pantoprazole daily for GERD.   Pt said she is not currently at home but does not know if she is taking the pantoprazole or not.   Recommended to check when she gets home and let me know. Pt agreed to plan.

## 2025-07-14 NOTE — TELEPHONE ENCOUNTER
Copied from CRM #[id number]. Topic: Medications - Medication Question  >> Jul 14, 2025  8:54 AM Latosha Torres wrote:  .Type: Patient  Requesting Call Back      Who Called: Connie    What is this regarding?: Medication request    Would the patient rather a call back or a response via [facility]?  Call back    Best Call Back Number: [phone]    Additional Information:  Patient is requesting a call back to discuss possibly getting a permanent prescription for her condition. Please call back

## 2025-07-15 ENCOUNTER — HOSPITAL ENCOUNTER (OUTPATIENT)
Facility: HOSPITAL | Age: 80
Discharge: HOME | End: 2025-07-15
Attending: INTERNAL MEDICINE | Admitting: INTERNAL MEDICINE
Payer: MEDICARE

## 2025-07-15 ENCOUNTER — TELEPHONE (OUTPATIENT)
Dept: ADMINISTRATIVE | Facility: CLINIC | Age: 80
End: 2025-07-15
Payer: MEDICARE

## 2025-07-15 ENCOUNTER — PATIENT MESSAGE (OUTPATIENT)
Dept: GASTROENTEROLOGY | Facility: CLINIC | Age: 80
End: 2025-07-15
Payer: MEDICARE

## 2025-07-15 DIAGNOSIS — Z82.49 FAMILY HISTORY OF CARDIAC DISORDER: ICD-10-CM

## 2025-07-15 DIAGNOSIS — R07.89 CHEST PAIN, ATYPICAL: ICD-10-CM

## 2025-07-15 DIAGNOSIS — R94.39 ABNORMAL NUCLEAR STRESS TEST: ICD-10-CM

## 2025-07-15 DIAGNOSIS — R94.31 EKG ABNORMALITIES: ICD-10-CM

## 2025-07-15 DIAGNOSIS — R00.0 TACHYCARDIA: ICD-10-CM

## 2025-07-15 LAB
CATH EF QUANTITATIVE: 60 %
OHS CV CPX PATIENT HEIGHT IN: 65
POCT GLUCOSE: 116 MG/DL (ref 70–110)

## 2025-07-15 PROCEDURE — 25500020 PHARM REV CODE 255: Performed by: INTERNAL MEDICINE

## 2025-07-15 PROCEDURE — C1887 CATHETER, GUIDING: HCPCS | Performed by: INTERNAL MEDICINE

## 2025-07-15 PROCEDURE — 25000003 PHARM REV CODE 250: Performed by: INTERNAL MEDICINE

## 2025-07-15 PROCEDURE — 85347 COAGULATION TIME ACTIVATED: CPT | Performed by: INTERNAL MEDICINE

## 2025-07-15 PROCEDURE — 93458 L HRT ARTERY/VENTRICLE ANGIO: CPT | Performed by: INTERNAL MEDICINE

## 2025-07-15 PROCEDURE — 93571 IV DOP VEL&/PRESS C FLO 1ST: CPT | Mod: 26,52,LC, | Performed by: INTERNAL MEDICINE

## 2025-07-15 PROCEDURE — 99152 MOD SED SAME PHYS/QHP 5/>YRS: CPT | Performed by: INTERNAL MEDICINE

## 2025-07-15 PROCEDURE — 63600175 PHARM REV CODE 636 W HCPCS: Performed by: INTERNAL MEDICINE

## 2025-07-15 PROCEDURE — C1769 GUIDE WIRE: HCPCS | Performed by: INTERNAL MEDICINE

## 2025-07-15 PROCEDURE — 93458 L HRT ARTERY/VENTRICLE ANGIO: CPT | Mod: 26,,, | Performed by: INTERNAL MEDICINE

## 2025-07-15 PROCEDURE — 93799 UNLISTED CV SVC/PROCEDURE: CPT | Mod: LC | Performed by: INTERNAL MEDICINE

## 2025-07-15 PROCEDURE — 99152 MOD SED SAME PHYS/QHP 5/>YRS: CPT | Mod: ,,, | Performed by: INTERNAL MEDICINE

## 2025-07-15 PROCEDURE — C1894 INTRO/SHEATH, NON-LASER: HCPCS | Performed by: INTERNAL MEDICINE

## 2025-07-15 PROCEDURE — 99153 MOD SED SAME PHYS/QHP EA: CPT | Performed by: INTERNAL MEDICINE

## 2025-07-15 RX ORDER — SODIUM CHLORIDE 9 MG/ML
INJECTION, SOLUTION INTRAVENOUS CONTINUOUS
Status: ACTIVE | OUTPATIENT
Start: 2025-07-15 | End: 2025-07-15

## 2025-07-15 RX ORDER — ONDANSETRON 8 MG/1
8 TABLET, ORALLY DISINTEGRATING ORAL EVERY 8 HOURS PRN
Status: DISCONTINUED | OUTPATIENT
Start: 2025-07-15 | End: 2025-07-15 | Stop reason: HOSPADM

## 2025-07-15 RX ORDER — NITROGLYCERIN 5 MG/ML
INJECTION, SOLUTION INTRAVENOUS
Status: DISCONTINUED | OUTPATIENT
Start: 2025-07-15 | End: 2025-07-15 | Stop reason: HOSPADM

## 2025-07-15 RX ORDER — ACETAMINOPHEN 325 MG/1
650 TABLET ORAL EVERY 4 HOURS PRN
Status: DISCONTINUED | OUTPATIENT
Start: 2025-07-15 | End: 2025-07-15 | Stop reason: HOSPADM

## 2025-07-15 RX ORDER — LIDOCAINE HYDROCHLORIDE 20 MG/ML
INJECTION, SOLUTION INFILTRATION; PERINEURAL
Status: DISCONTINUED | OUTPATIENT
Start: 2025-07-15 | End: 2025-07-15 | Stop reason: HOSPADM

## 2025-07-15 RX ORDER — SODIUM CHLORIDE 9 MG/ML
INJECTION, SOLUTION INTRAVENOUS
Status: DISCONTINUED | OUTPATIENT
Start: 2025-07-15 | End: 2025-07-15 | Stop reason: HOSPADM

## 2025-07-15 RX ORDER — ONDANSETRON HYDROCHLORIDE 2 MG/ML
INJECTION, SOLUTION INTRAVENOUS
Status: DISCONTINUED | OUTPATIENT
Start: 2025-07-15 | End: 2025-07-15 | Stop reason: HOSPADM

## 2025-07-15 RX ORDER — FENTANYL CITRATE 50 UG/ML
INJECTION, SOLUTION INTRAMUSCULAR; INTRAVENOUS
Status: DISCONTINUED | OUTPATIENT
Start: 2025-07-15 | End: 2025-07-15 | Stop reason: HOSPADM

## 2025-07-15 RX ORDER — DIAZEPAM 5 MG/1
5 TABLET ORAL EVERY 6 HOURS PRN
Status: DISCONTINUED | OUTPATIENT
Start: 2025-07-15 | End: 2025-07-15 | Stop reason: HOSPADM

## 2025-07-15 RX ORDER — MIDAZOLAM HYDROCHLORIDE 1 MG/ML
INJECTION, SOLUTION INTRAMUSCULAR; INTRAVENOUS
Status: DISCONTINUED | OUTPATIENT
Start: 2025-07-15 | End: 2025-07-15 | Stop reason: HOSPADM

## 2025-07-15 RX ORDER — METFORMIN HYDROCHLORIDE 500 MG/1
1000 TABLET, EXTENDED RELEASE ORAL 2 TIMES DAILY
Qty: 360 TABLET | Refills: 1 | Status: SHIPPED | OUTPATIENT
Start: 2025-07-18

## 2025-07-15 RX ORDER — DIPHENHYDRAMINE HCL 50 MG
50 CAPSULE ORAL ONCE
Status: COMPLETED | OUTPATIENT
Start: 2025-07-15 | End: 2025-07-15

## 2025-07-15 RX ORDER — SODIUM CHLORIDE 0.9 % (FLUSH) 0.9 %
10 SYRINGE (ML) INJECTION
Status: DISCONTINUED | OUTPATIENT
Start: 2025-07-15 | End: 2025-07-15 | Stop reason: HOSPADM

## 2025-07-15 RX ORDER — TICAGRELOR 90 MG/1
TABLET, FILM COATED ORAL
Status: DISCONTINUED | OUTPATIENT
Start: 2025-07-15 | End: 2025-07-15 | Stop reason: HOSPADM

## 2025-07-15 RX ORDER — VERAPAMIL HYDROCHLORIDE 2.5 MG/ML
INJECTION INTRAVENOUS
Status: DISCONTINUED | OUTPATIENT
Start: 2025-07-15 | End: 2025-07-15 | Stop reason: HOSPADM

## 2025-07-15 RX ORDER — NAPROXEN SODIUM 220 MG/1
81 TABLET, FILM COATED ORAL ONCE
Status: COMPLETED | OUTPATIENT
Start: 2025-07-15 | End: 2025-07-15

## 2025-07-15 RX ORDER — HEPARIN SODIUM 1000 [USP'U]/ML
INJECTION, SOLUTION INTRAVENOUS; SUBCUTANEOUS
Status: DISCONTINUED | OUTPATIENT
Start: 2025-07-15 | End: 2025-07-15 | Stop reason: HOSPADM

## 2025-07-15 RX ADMIN — DIPHENHYDRAMINE HYDROCHLORIDE 50 MG: 50 CAPSULE ORAL at 06:07

## 2025-07-15 RX ADMIN — ONDANSETRON 8 MG: 8 TABLET, ORALLY DISINTEGRATING ORAL at 01:07

## 2025-07-15 RX ADMIN — SODIUM CHLORIDE: 9 INJECTION, SOLUTION INTRAVENOUS at 06:07

## 2025-07-15 RX ADMIN — DIAZEPAM 5 MG: 5 TABLET ORAL at 06:07

## 2025-07-15 RX ADMIN — ASPIRIN 81 MG CHEWABLE TABLET 81 MG: 81 TABLET CHEWABLE at 06:07

## 2025-07-15 NOTE — INTERVAL H&P NOTE
The patient has been examined and the H&P has been reviewed:    I concur with the findings and no changes have occurred since H&P was written.    Procedure risks, benefits and alternative options discussed and understood by patient/family.          Active Hospital Problems    Diagnosis  POA    *Abnormal nuclear stress test [R94.39]  Yes    Nocturnal hypoxemia [G47.34]  Yes    Hyperlipidemia associated with type 2 diabetes mellitus [E11.69, E78.5]  Yes    Diabetes mellitus type 2 without retinopathy [E11.9]  Yes    Controlled type 2 diabetes mellitus with complication, without long-term current use of insulin [E11.8]  Yes    History of diabetic retinopathy Mild nonproliferative diabetic retinopathy of both eyes (None on eye exam 11/26/2018) [Z86.39]  Yes     OS>OD        Resolved Hospital Problems   No resolved problems to display.

## 2025-07-15 NOTE — TELEPHONE ENCOUNTER
Ms. Fields is currently taking pravastatin 40 mg hs. Her LDL has been in the desired range of < 70 mg/dL until recently. If LDL does not return to previous levels, recommend changing to high intensity statin.    ----- Message from RENY Marin sent at 7/15/2025  6:27 AM CDT -----  Regarding: Order for GREGORY FIELDS    Patient Name: GREGORY FIELDS([id number])  Sex: Female  : 1945      PCP: ANN RICHMOND    Center: [facility]     Types of orders made on 07/15/2025: Diet, Medications, Nursing, Transfer    Order Date:2025  Ordering User:HUY LOWERY [[id number]]  Attending Provider:Huy Lowery MD [[id number]]  Authorizing Provider: Huy Lowery MD [[id number]]  Department:[facility] CATH LAB[[id number]]    Order Specific Information  Order: Notify [facility] Team [Custom: [id number]]  Order #: [id number]: 1    Priority: Routine  Class: Hospital Performed    Released on: 07/15/2025  6:27 AM        Priority: Routine  Class: Hospital Performed    Released on: 07/15/2025  6:27 AM

## 2025-07-15 NOTE — Clinical Note
The catheter was inserted over the wire into the ostium   left main. The catheter was unable to engage the area..

## 2025-07-15 NOTE — PLAN OF CARE
DISCHARGE INSTRUCTIONS REV'D W/ PT AND SPOUSE, BVU. VSS. PT AMBULATORY IN CVRU W/O DIFF, NO COMPLAINTS. L RADIAL ARTERIAL SITE WDL, DRESSING CDI, NO BLEEDING OR HEMATOMA, BRUISING NOTED, +2 RADIAL PULSE. IV REMOVED. PT WHEELED TO CAR.

## 2025-07-15 NOTE — OP NOTE
INPATIENT Operative Note         SUMMARY     Surgery Date: 7/15/2025     Surgeons and Role:     * Huy Lowery MD - Primary    ASSISTANT:none    Pre-op Diagnosis:  Tachycardia [R00.0]  Abnormal nuclear stress test [R94.39]  EKG abnormalities [R94.31]  Family history of cardiac disorder [Z82.49]  Chest pain, atypical [R07.89]      Post-op Diagnosis:  Tachycardia [R00.0]  Abnormal nuclear stress test [R94.39]  EKG abnormalities [R94.31]  Family history of cardiac disorder [Z82.49]  Chest pain, atypical [R07.89]    Procedure(s) (LRB):  Left heart cath (Left)  Instantaneous Wave-Free Ratio (IFR) (N/A)    COMPLICATION:none    Anesthesia: RN IV Sedation    Findings/Key Components:  Lad normal   Rca normal  Lcx prox 60% ifr 0.96  Lvf normal    Estimated Blood Loss: < 50 ML.         SPECIMEN: NONE    Devices/Prostetics: None    PLAN: reassure medical therapy

## 2025-07-17 ENCOUNTER — PATIENT MESSAGE (OUTPATIENT)
Dept: GASTROENTEROLOGY | Facility: CLINIC | Age: 80
End: 2025-07-17

## 2025-07-17 ENCOUNTER — OFFICE VISIT (OUTPATIENT)
Dept: GASTROENTEROLOGY | Facility: CLINIC | Age: 80
End: 2025-07-17
Payer: MEDICARE

## 2025-07-17 VITALS
RESPIRATION RATE: 12 BRPM | BODY MASS INDEX: 22.82 KG/M2 | SYSTOLIC BLOOD PRESSURE: 135 MMHG | HEIGHT: 65 IN | HEART RATE: 80 BPM | WEIGHT: 137 LBS | HEIGHT: 65 IN | OXYGEN SATURATION: 97 % | BODY MASS INDEX: 22.34 KG/M2 | DIASTOLIC BLOOD PRESSURE: 65 MMHG | DIASTOLIC BLOOD PRESSURE: 69 MMHG | HEART RATE: 62 BPM | WEIGHT: 134.06 LBS | TEMPERATURE: 98 F | SYSTOLIC BLOOD PRESSURE: 106 MMHG

## 2025-07-17 DIAGNOSIS — K21.9 GASTROESOPHAGEAL REFLUX DISEASE WITHOUT ESOPHAGITIS: Primary | ICD-10-CM

## 2025-07-17 DIAGNOSIS — R19.7 DIARRHEA, UNSPECIFIED TYPE: ICD-10-CM

## 2025-07-17 DIAGNOSIS — E11.8 CONTROLLED TYPE 2 DIABETES MELLITUS WITH COMPLICATION, WITHOUT LONG-TERM CURRENT USE OF INSULIN: ICD-10-CM

## 2025-07-17 DIAGNOSIS — K44.9 HIATAL HERNIA: ICD-10-CM

## 2025-07-17 DIAGNOSIS — R11.0 NAUSEA: ICD-10-CM

## 2025-07-17 DIAGNOSIS — K57.30 DIVERTICULOSIS OF COLON: ICD-10-CM

## 2025-07-17 PROCEDURE — 1101F PT FALLS ASSESS-DOCD LE1/YR: CPT | Mod: CPTII,S$GLB,, | Performed by: INTERNAL MEDICINE

## 2025-07-17 PROCEDURE — 3074F SYST BP LT 130 MM HG: CPT | Mod: CPTII,S$GLB,, | Performed by: INTERNAL MEDICINE

## 2025-07-17 PROCEDURE — 1159F MED LIST DOCD IN RCRD: CPT | Mod: CPTII,S$GLB,, | Performed by: INTERNAL MEDICINE

## 2025-07-17 PROCEDURE — 3078F DIAST BP <80 MM HG: CPT | Mod: CPTII,S$GLB,, | Performed by: INTERNAL MEDICINE

## 2025-07-17 PROCEDURE — 3072F LOW RISK FOR RETINOPATHY: CPT | Mod: CPTII,S$GLB,, | Performed by: INTERNAL MEDICINE

## 2025-07-17 PROCEDURE — 3288F FALL RISK ASSESSMENT DOCD: CPT | Mod: CPTII,S$GLB,, | Performed by: INTERNAL MEDICINE

## 2025-07-17 PROCEDURE — 1160F RVW MEDS BY RX/DR IN RCRD: CPT | Mod: CPTII,S$GLB,, | Performed by: INTERNAL MEDICINE

## 2025-07-17 PROCEDURE — 99214 OFFICE O/P EST MOD 30 MIN: CPT | Mod: S$GLB,,, | Performed by: INTERNAL MEDICINE

## 2025-07-17 PROCEDURE — 1126F AMNT PAIN NOTED NONE PRSNT: CPT | Mod: CPTII,S$GLB,, | Performed by: INTERNAL MEDICINE

## 2025-07-17 PROCEDURE — 99999 PR PBB SHADOW E&M-EST. PATIENT-LVL V: CPT | Mod: PBBFAC,,, | Performed by: INTERNAL MEDICINE

## 2025-07-17 NOTE — PATIENT INSTRUCTIONS
07/17/2025    Dear Connie Fields,    We are writing to express our heartfelt gratitude for choosing us as your healthcare provider and entrusting us with your well-being. Your health and satisfaction are our top priorities, and we are truly honored to have the opportunity to serve you.    At [facility], we strive to provide the best possible care and support for our patients. My assessment and recommendations are as follows:    Gastroesophageal reflux disease without esophagitis    Diverticulosis of colon    Diarrhea, unspecified type    Hiatal hernia    Controlled type 2 diabetes mellitus with complication, without long-term current use of insulin    Nausea  -     NM Gastric Emptying; Future; Expected date: 07/17/2025        To support optimal gut health and maintain regular bowel movements, we recommend incorporating the following into your daily routine:    Psyllium Husk - A soluble fiber that aids digestion, promotes stool regularity, and supports overall gut health.  Magnesium Glycinate - Helps with muscle relaxation, including the intestinal muscles, to ease bowel movements and reduce cramping.  Ground Flaxseed - A rich source of fiber and omega-3 fatty acids that supports digestion and reduces inflammation.  Probiotics (Florastor or Align) - Supports a healthy gut microbiome by replenishing beneficial bacteria and promoting digestive balance.    Patients may adjust the frequency and dosage of these supplements based on their individual tolerance and response. If you experience loose stools or discomfort, you may reduce the dosage or take them less frequently. If you need additional digestive support, gradual increases may be beneficial.    It is essential to drink plenty of water throughout the day to ensure these supplements work effectively and to prevent constipation.     Additionally, lifestyle modifications can significantly improve symptoms of acid reflux. These include:  Maintaining a  "healthy weight  Elevating the head of the bed to reduce nighttime reflux  Avoiding trigger foods such as spicy, acidic, or fatty foods  Refraining from eating late meals and staying upright for at least 1.5 hours after eating    We genuinely value your feedback and believe that it plays a crucial role in our pursuit of excellence. We kindly request you to take a few minutes of your time to share your experience with us by posting a [employer] review. Your honest thoughts and opinions can make a significant difference for others seeking healthcare services and will help us better understand how we can further enhance our patient care.    Your kind words and positive reviews will not only motivate our dedicated team but will also inspire confidence in potential patients who are looking for exceptional healthcare services.    We genuinely value your feedback and believe that it plays a crucial role in our pursuit of excellence. We kindly request you to take a few minutes of your time to share your experience with us by posting a Google review. Your honest thoughts and opinions can make a significant difference for others seeking healthcare services and will help us better understand how we can further enhance our patient care.    Thank you for trusting us with your care,        Godfrey Wells M.D.  click on the link for contact information.       PS: At [facility] we offer virtual visits. Please use your [facility] pema to schedule a virtual visit.     To rate your experience with Dr. Wells, click on this link    To post a review, simply follow these quick steps:  1. Visit [employer] or search for my name on Google.  2. Click on the "Write a review" button on the left-hand side of the page.  3. Rate your experience by choosing the number of stars that reflect your satisfaction.  4. Share your thoughts and insights about your visit - we'd love to hear your feedback!    "

## 2025-07-17 NOTE — PROGRESS NOTES
[facility]  Gastroenterology    Patient evaluated at the request of Loli Erwin Md  [address] [place] [address]     PCP: Loli Erwin MD    Chief Complaint    Gastroesophageal Reflux; Diarrhea; Nausea           History of present illness/subjective    The patient is an 80-year-old female presenting for follow-up of ongoing and progressively worsening gastrointestinal symptoms. She is a new patient to me, though she has previously been seen by ELSA Cole; Ms. Meli NP; and Dr. Gregory. She has a longstanding history of chronic GI issues, which appear to be exacerbated by anxiety and stress.    She has undergone three upper endoscopies since 2016, with the most recent performed in May 2025 confirming a hiatal hernia. An earlier EGD in 2023 showed erosive gastropathy with recent stigmata of bleeding, and testing for Helicobacter pylori was negative. She reports persistent and severe nausea, especially over the past 8 months. Notably, she experiences a paradoxical sense of hunger but becomes nauseated at the sight of food or upon attempting to eat. This has been accompanied by worsening heartburn, indigestion, and episodes of burning substernal chest pain, for which she was recently evaluated by cardiology and deemed cardiac clear following a stress test.    She is currently on pantoprazole 40 mg daily and Carafate, although she is unsure if these medications are providing symptom relief. She has also been using Aleve once daily, which may contribute to GI irritation. She denies any overt signs of gastrointestinal bleeding, including hematochezia or melena. Her colonoscopy in 2022 was notable for polyps, and she is scheduled for routine surveillance in five years.  Overall, her symptoms appear multifactorial, with possible contributions from hiatal hernia, NSAID use, anxiety, and erosive gastritis, warranting careful reassessment of her current management  plan.    Past Medical History:   Diagnosis Date    Anxiety     Anxiety     Cataract (lens) fragments in eye following cataract surgery, bilateral     DM (diabetes mellitus) 2013     10/21/2018    DM (diabetes mellitus) 2013     am 12/09/2019    DM2 (diabetes mellitus, type 2) 01/13    GERD (gastroesophageal reflux disease)     Hiatal hernia     Mild nonproliferative diabetic retinopathy of both eyes 1/22/15    OS>OD    Mild nonproliferative diabetic retinopathy of both eyes 1/22/2015    OS>OD     Osteoarthritis        Past Surgical History:   Procedure Laterality Date    AUGMENTATION OF BREAST      saline    BREAST SURGERY Bilateral 1992    saline implants    BUNIONECTOMY      CATARACT EXTRACTION W/  INTRAOCULAR LENS IMPLANT Bilateral     12/12/18 OD, 1/9/19 OS, Dr Higuera    CHOLECYSTECTOMY  05/12    COLONOSCOPY  08    1 POLYP    COLONOSCOPY N/A 12/6/2016    Procedure: COLONOSCOPY with biopsies;  Surgeon: Sarah Schmidt MD;  Location: [facility];  Service: Endoscopy;  Laterality: N/A;    COLONOSCOPY N/A 10/19/2022    Procedure: COLONOSCOPY;  Surgeon: Lindsey Gregory MD;  Location: [facility];  Service: Endoscopy;  Laterality: N/A;    ESOPHAGOGASTRODUODENOSCOPY N/A 1/31/2023    Procedure: EGD (ESOPHAGOGASTRODUODENOSCOPY);  Surgeon: Harvinder Head MD;  Location: [facility];  Service: Endoscopy;  Laterality: N/A;    INSTANTANEOUS WAVE-FREE RATIO (IFR) N/A 7/15/2025    Procedure: Instantaneous Wave-Free Ratio (IFR);  Surgeon: Huy Lowery MD;  Location: [facility] CATH LAB;  Service: Cardiology;  Laterality: N/A;    LEFT HEART CATHETERIZATION Left 7/15/2025    Procedure: Left heart cath;  Surgeon: Huy Lowery MD;  Location: [facility] CATH LAB;  Service: Cardiology;  Laterality: Left;    PCIOL  Right 12/12/2018    DR. HIGUERA    TONSILLECTOMY, ADENOIDECTOMY         Medications Ordered Prior to Encounter[1]    Review of patient's allergies indicates:  No Known Allergies    Social  "History[2]    Family History   Problem Relation Name Age of Onset    Heart failure Father      Suicide Father      Diabetes Mother      Heart failure Mother      Hypertension Mother      Stroke Mother      Heart failure Brother      Cancer Brother          full body    Hypertension Brother      Coronary artery disease Brother      Diabetes Maternal Grandmother      Hypertension Son      Suicide Son         Vitals:    07/17/25 1000   BP: 106/69   Patient Position: Sitting   Pulse: 80   Weight: 60.8 kg (134 lb 0.6 oz)   Height: 5' 5" (1.651 m)     Body mass index is 22.31 kg/m².    Physical Exam  Constitutional:       Appearance: She is well-developed.   HENT:      Head: Normocephalic and atraumatic.   Eyes:      Pupils: Pupils are equal, round, and reactive to light.   Neck:      Thyroid: No thyromegaly.   Cardiovascular:      Rate and Rhythm: Normal rate and regular rhythm.      Heart sounds: Normal heart sounds. No murmur heard.  Pulmonary:      Effort: Pulmonary effort is normal. No respiratory distress.      Breath sounds: Normal breath sounds. No wheezing or rales.   Abdominal:      General: Bowel sounds are normal. There is no distension.      Palpations: Abdomen is soft. There is no mass.      Tenderness: There is no abdominal tenderness.   Musculoskeletal:         General: No tenderness. Normal range of motion.      Cervical back: Normal range of motion and neck supple.   Lymphadenopathy:      Cervical: No cervical adenopathy.   Skin:     General: Skin is warm and dry.      Findings: No erythema.   Neurological:      Mental Status: She is alert and oriented to person, place, and time.      Cranial Nerves: No cranial nerve deficit.      Deep Tendon Reflexes: Reflexes are normal and symmetric.   Psychiatric:         Behavior: Behavior normal.         Lab Results   Component Value Date    WBC 8.55 06/24/2025    HGB 13.6 06/24/2025    HCT 41.5 06/24/2025    MCV 85 06/24/2025     06/24/2025         BMP  Lab " "Results   Component Value Date     06/24/2025    K 4.1 06/24/2025     06/24/2025    CO2 26 06/24/2025    BUN 13 06/24/2025    CREATININE 0.7 06/24/2025    CALCIUM 9.5 06/24/2025    ANIONGAP 10 06/24/2025    EGFRNORACEVR >60 06/24/2025       Lab Results   Component Value Date    ALT 16 05/05/2025    AST 16 05/05/2025    ALKPHOS 52 05/05/2025    BILITOT 0.8 05/05/2025       No results found for: "LIPASE"    ASSESSMENT AND RECOMMENDATIONS     1. Gastroesophageal reflux disease without esophagitis    2. Diverticulosis of colon    3. Diarrhea, unspecified type    4. Hiatal hernia    5. Controlled type 2 diabetes mellitus with complication, without long-term current use of insulin    6. Nausea  -     NM Gastric Emptying; Future; Expected date: 07/17/2025       To support optimal gut health and maintain regular bowel movements, we recommend incorporating the following into your daily routine:    Psyllium Husk - A soluble fiber that aids digestion, promotes stool regularity, and supports overall gut health.  Magnesium Glycinate - Helps with muscle relaxation, including the intestinal muscles, to ease bowel movements and reduce cramping.  Ground Flaxseed - A rich source of fiber and omega-3 fatty acids that supports digestion and reduces inflammation.  Probiotics (Florastor or Align) - Supports a healthy gut microbiome by replenishing beneficial bacteria and promoting digestive balance.    Patients may adjust the frequency and dosage of these supplements based on their individual tolerance and response. If you experience loose stools or discomfort, you may reduce the dosage or take them less frequently. If you need additional digestive support, gradual increases may be beneficial.    It is essential to drink plenty of water throughout the day to ensure these supplements work effectively and to prevent constipation.     Additionally, lifestyle modifications can significantly improve symptoms of acid reflux. These " include:  Maintaining a healthy weight  Elevating the head of the bed to reduce nighttime reflux  Avoiding trigger foods such as spicy, acidic, or fatty foods  Refraining from eating late meals and staying upright for at least 1.5 hours after eating    The referring provider will be notified that our consultation is complete and available through the patient's records.    Thanks for letting us participate in the care of this nice patient,          Godfrey Wells               [1]   Current Outpatient Medications on File Prior to Visit   Medication Sig Dispense Refill    aspirin (ECOTRIN) 81 MG EC tablet Take 1 tablet (81 mg total) by mouth once daily. 90 tablet 1    buPROPion (WELLBUTRIN XL) 300 MG 24 hr tablet Take 1 tablet (300 mg total) by mouth once daily. 90 tablet 3    lysine 500 mg Tab Take 500 mg by mouth once daily.      [START ON 7/18/2025] metFORMIN (GLUCOPHAGE-XR) 500 MG ER 24hr tablet Take 2 tablets (1,000 mg total) by mouth 2 (two) times daily. 360 tablet 1    multivitamin capsule Take 1 capsule by mouth once daily.      nitroGLYCERIN (NITROSTAT) 0.4 MG SL tablet Place 1 tablet (0.4 mg total) under the tongue every 5 (five) minutes as needed for Chest pain. 30 tablet 0    oxybutynin (DITROPAN) 5 MG Tab Take 1 tablet (5 mg total) by mouth 2 (two) times daily. 180 tablet 3    pantoprazole (PROTONIX) 40 MG tablet Take 1 tablet (40 mg total) by mouth once daily. 90 tablet 3    PHENAZOPYRIDINE HCL (AZO ORAL) Take 1 tablet by mouth daily as needed.       pravastatin (PRAVACHOL) 40 MG tablet Take 1 tablet (40 mg total) by mouth once daily. 90 tablet 3    traZODone (DESYREL) 50 MG tablet Take 2 tablets (100 mg total) by mouth every evening. 90 tablet 1    [DISCONTINUED] sucralfate (CARAFATE) 1 gram tablet Take 1 tablet (1 g total) by mouth 2 (two) times daily. 180 tablet 3    levocetirizine (XYZAL) 5 MG tablet Take 1 tablet (5 mg total) by mouth every evening. 30 tablet 11     No current facility-administered  medications on file prior to visit.   [2]   Social History  Socioeconomic History    Marital status:    Tobacco Use    Smoking status: Never     Passive exposure: Never    Smokeless tobacco: Never   Substance and Sexual Activity    Alcohol use: Yes     Comment: occasionally    Drug use: No    Sexual activity: Yes     Partners: Male     Social Drivers of Health     Financial Resource Strain: Low Risk  (3/19/2021)    Overall Financial Resource Strain (CARDIA)     Difficulty of Paying Living Expenses: Not hard at all   Food Insecurity: No Food Insecurity (3/19/2021)    Hunger Vital Sign     Worried About Running Out of Food in the Last Year: Never true     Ran Out of Food in the Last Year: Never true   Transportation Needs: No Transportation Needs (3/19/2021)    PRAPARE - Transportation     Lack of Transportation (Medical): No     Lack of Transportation (Non-Medical): No   Physical Activity: Sufficiently Active (3/19/2021)    Exercise Vital Sign     Days of Exercise per Week: 5 days     Minutes of Exercise per Session: 60 min   Stress: No Stress Concern Present (3/19/2021)    [employer] [place] [employer] - Occupational Stress Questionnaire     Feeling of Stress : Not at all   Housing Stability: Low Risk  (3/19/2021)    Housing Stability Vital Sign     Unable to Pay for Housing in the Last Year: No     Number of Places Lived in the Last Year: 1     Unstable Housing in the Last Year: No

## 2025-07-18 NOTE — DISCHARGE SUMMARY
[facility] - Cath Lab (Hospital)  Discharge Note  Short Stay    Procedure(s) (LRB):  Left heart cath (Left)  Instantaneous Wave-Free Ratio (IFR) (N/A)      OUTCOME: Patient tolerated treatment/procedure well without complication and is now ready for discharge.    DISPOSITION: Home or Self Care    FINAL DIAGNOSIS:  Abnormal nuclear stress test    FOLLOWUP: In clinic    DISCHARGE INSTRUCTIONS:    Discharge Procedure Orders   Diet general     Call MD for:  temperature >100.4     Call MD for:  persistent nausea and vomiting     Call MD for:  severe uncontrolled pain     Call MD for:  difficulty breathing, headache or visual disturbances     Call MD for:  redness, tenderness, or signs of infection (pain, swelling, redness, odor or green/yellow discharge around incision site)     Call MD for:  hives     Call MD for:  persistent dizziness or light-headedness     Call MD for:  extreme fatigue        TIME SPENT ON DISCHARGE: 35minutes

## 2025-07-22 DIAGNOSIS — G47.00 INSOMNIA, UNSPECIFIED TYPE: ICD-10-CM

## 2025-07-22 RX ORDER — TRAZODONE HYDROCHLORIDE 50 MG/1
100 TABLET ORAL NIGHTLY
Qty: 180 TABLET | Refills: 3 | Status: SHIPPED | OUTPATIENT
Start: 2025-07-22

## 2025-07-22 NOTE — TELEPHONE ENCOUNTER
No care due was identified.  [facility] Embedded Care Due Messages. Reference number: [id number].   7/22/2025 2:34:20 PM CDT

## 2025-07-22 NOTE — TELEPHONE ENCOUNTER
Copied from CRM #[id number]. Topic: Medications - Medication Refill  >> Jul 22, 2025  2:21 PM Meredith wrote:  .Type:  RX Refill Request    Who Called:  Connie   Refill or New Rx: refill   RX Name and Strength: traZODone (DESYREL) 50 MG tablet  How is the patient currently taking it? (ex. 1XDay): Take 2 tablets (100 mg total) by mouth every evening  Is this a 30 day or 90 day RX: 90 day   Preferred Pharmacy with phone number: [facility] [address]  [address] [address]  Phone: [phone] Fax: [phone]  Local or Mail Order: Mail Order   Ordering Provider: Loli Erwin   Would the patient rather a call back or a response via [facility]?  Call back   Best Call Back Number: [phone]  Additional Information:  Pt states she is completely out of the medication and would like to get a call back to discuss if it ok to be without until it is delivered

## 2025-07-23 NOTE — TELEPHONE ENCOUNTER
Refill Decision Note   Connie Fields  is requesting a refill authorization.  Brief Assessment and Rationale for Refill:  Approve     Medication Therapy Plan:         Comments:     Note composed:9:14 PM 07/22/2025             Appointments     Last Visit   5/7/2025 Loli Erwin MD   Next Visit   11/10/2025 Loli Erwin MD

## 2025-08-01 ENCOUNTER — OFFICE VISIT (OUTPATIENT)
Dept: CARDIOLOGY | Facility: CLINIC | Age: 80
End: 2025-08-01
Payer: MEDICARE

## 2025-08-01 VITALS
RESPIRATION RATE: 16 BRPM | OXYGEN SATURATION: 95 % | DIASTOLIC BLOOD PRESSURE: 62 MMHG | BODY MASS INDEX: 21.74 KG/M2 | SYSTOLIC BLOOD PRESSURE: 98 MMHG | HEART RATE: 84 BPM | WEIGHT: 130.5 LBS | HEIGHT: 65 IN

## 2025-08-01 DIAGNOSIS — E11.69 HYPERLIPIDEMIA ASSOCIATED WITH TYPE 2 DIABETES MELLITUS: Primary | ICD-10-CM

## 2025-08-01 DIAGNOSIS — E11.9 DIABETES MELLITUS TYPE 2 WITHOUT RETINOPATHY: ICD-10-CM

## 2025-08-01 DIAGNOSIS — I25.118 CORONARY ARTERY DISEASE OF NATIVE ARTERY OF NATIVE HEART WITH STABLE ANGINA PECTORIS: ICD-10-CM

## 2025-08-01 DIAGNOSIS — E78.5 HYPERLIPIDEMIA ASSOCIATED WITH TYPE 2 DIABETES MELLITUS: Primary | ICD-10-CM

## 2025-08-01 DIAGNOSIS — R00.0 TACHYCARDIA: ICD-10-CM

## 2025-08-01 DIAGNOSIS — I77.89 OTHER SPECIFIED DISORDERS OF ARTERIES AND ARTERIOLES: ICD-10-CM

## 2025-08-01 PROCEDURE — 99999 PR PBB SHADOW E&M-EST. PATIENT-LVL IV: CPT | Mod: PBBFAC,,, | Performed by: INTERNAL MEDICINE

## 2025-08-01 NOTE — PROGRESS NOTES
Subjective:   Patient ID:  Connie Fields is a 80 y.o. female who presents for follow up of No chief complaint on file.      HFU  The cath done by Dr. Lowery showed nonob CAD Lcx 60%. Left radial access stable  Remains on palpitation  Negative sleep study        History of Present Illness    CHIEF COMPLAINT:  - Connie presents for follow-up to discuss recent cardiac test results and ongoing episodes of palpitations.    HPI:  - Experiences episodes of palpitations described as racing, with HR increasing to 135-140 bpm  - Episodes occur 3 times per week, usually when bowling, and last 30 seconds each time  - Episodes persist but have decreased in duration  - Feels mild pressure when HR elevates  - Underwent several cardiac tests recently, including a stress test with Dr. Fernández and a left heart catheterization with Dr. Leos  - Catheterization revealed a 60% stenosis in one area  - Has borderline diabetes, takes metformin  - Takes aspirin 81 mg daily and pravastatin  - Achieved significant weight loss and adheres to a healthy diet and regular exercise routine  - Underwent a sleep study which ruled out sleep apnea  - Denies dizziness, near-syncope, or weakness during the palpitation episodes  - Denies numbness or tingling in the fingers, snoring at night, and current sleep problems  - Denies coffee or tea consumption, limits herself to one diet coke a day, primarily drinks water    CARDIAC HISTORY:  - Echo: EF normal range  - Nuclear stress test (Dr. Metzger): mild calcification mid epicardial anterior apical  - LHC (Dr. Leos): EF 60%, 60% stenosis    MEDICATIONS:  - Aspirin 81 mg daily  - Pravastatin  - Metformin for borderline diabetes    TEST RESULTS:  - LDL: 78 mg/dL  - Sleep study: Negative for sleep apnea    MEDICAL HISTORY:  - Borderline diabetes    SOCIAL HISTORY:  - Drinks diet coke daily  - Denies smoking  - She maintains a healthy diet and regular exercise routine.          Past Medical History:    Diagnosis Date    Anxiety     Anxiety     Cataract (lens) fragments in eye following cataract surgery, bilateral     DM (diabetes mellitus) 2013     10/21/2018    DM (diabetes mellitus) 2013     am 12/09/2019    DM2 (diabetes mellitus, type 2) 01/13    GERD (gastroesophageal reflux disease)     Hiatal hernia     Mild nonproliferative diabetic retinopathy of both eyes 1/22/15    OS>OD    Mild nonproliferative diabetic retinopathy of both eyes 1/22/2015    OS>OD     Osteoarthritis        Past Surgical History:   Procedure Laterality Date    AUGMENTATION OF BREAST      saline    BREAST SURGERY Bilateral 1992    saline implants    BUNIONECTOMY      CATARACT EXTRACTION W/  INTRAOCULAR LENS IMPLANT Bilateral     12/12/18 OD, 1/9/19 OS, Dr Higuera    CHOLECYSTECTOMY  05/12    COLONOSCOPY  08    1 POLYP    COLONOSCOPY N/A 12/6/2016    Procedure: COLONOSCOPY with biopsies;  Surgeon: Sarah Schmidt MD;  Location: [facility];  Service: Endoscopy;  Laterality: N/A;    COLONOSCOPY N/A 10/19/2022    Procedure: COLONOSCOPY;  Surgeon: Lindsey Gregory MD;  Location: [facility];  Service: Endoscopy;  Laterality: N/A;    ESOPHAGOGASTRODUODENOSCOPY N/A 1/31/2023    Procedure: EGD (ESOPHAGOGASTRODUODENOSCOPY);  Surgeon: Harvinder Head MD;  Location: [facility];  Service: Endoscopy;  Laterality: N/A;    INSTANTANEOUS WAVE-FREE RATIO (IFR) N/A 7/15/2025    Procedure: Instantaneous Wave-Free Ratio (IFR);  Surgeon: Huy Lowery MD;  Location: [facility] CATH LAB;  Service: Cardiology;  Laterality: N/A;    LEFT HEART CATHETERIZATION Left 7/15/2025    Procedure: Left heart cath;  Surgeon: Huy Lowery MD;  Location: [facility] CATH LAB;  Service: Cardiology;  Laterality: Left;    PCIOL  Right 12/12/2018    DR. HIGUERA    TONSILLECTOMY, ADENOIDECTOMY         Social History[1]    Family History   Problem Relation Name Age of Onset    Heart failure Father      Suicide Father      Diabetes Mother      Heart failure Mother       Hypertension Mother      Stroke Mother      Heart failure Brother      Cancer Brother          full body    Hypertension Brother      Coronary artery disease Brother      Diabetes Maternal Grandmother      Hypertension Son      Suicide Son           ROS    Objective:   Physical Exam  HENT:      Head: Normocephalic.   Eyes:      Pupils: Pupils are equal, round, and reactive to light.   Neck:      Thyroid: No thyromegaly.      Vascular: Normal carotid pulses. No carotid bruit or JVD.   Cardiovascular:      Rate and Rhythm: Normal rate and regular rhythm. No extrasystoles are present.     Chest Wall: PMI is not displaced.      Pulses: Normal pulses.      Heart sounds: Normal heart sounds. No murmur heard.     No gallop. No S3 sounds.   Pulmonary:      Effort: No respiratory distress.      Breath sounds: Normal breath sounds. No stridor.   Abdominal:      General: Bowel sounds are normal.      Palpations: Abdomen is soft.      Tenderness: There is no abdominal tenderness. There is no rebound.   Skin:     Findings: No rash.   Neurological:      Mental Status: She is alert and oriented to person, place, and time.   Psychiatric:         Behavior: Behavior normal.         Lab Results   Component Value Date    CHOL 148 11/11/2024    CHOL 120 10/25/2023    CHOL 107 (L) 08/19/2022     Lab Results   Component Value Date    HDL 51 11/11/2024    HDL 51 10/25/2023    HDL 54 08/19/2022     Lab Results   Component Value Date    LDLCALC 78.0 11/11/2024    LDLCALC 54.6 (L) 10/25/2023    LDLCALC 43.4 (L) 08/19/2022     Lab Results   Component Value Date    TRIG 95 11/11/2024    TRIG 72 10/25/2023    TRIG 48 08/19/2022     Lab Results   Component Value Date    CHOLHDL 34.5 11/11/2024    CHOLHDL 42.5 10/25/2023    CHOLHDL 50.5 (H) 08/19/2022       Chemistry        Component Value Date/Time     06/24/2025 1611     11/11/2024 0802    K 4.1 06/24/2025 1611    K 4.1 11/11/2024 0802     06/24/2025 1611      "11/11/2024 0802    CO2 26 06/24/2025 1611    CO2 28 11/11/2024 0802    BUN 13 06/24/2025 1611    CREATININE 0.7 06/24/2025 1611     (H) 06/24/2025 1611    GLU 92 11/11/2024 0802        Component Value Date/Time    CALCIUM 9.5 06/24/2025 1611    CALCIUM 9.4 11/11/2024 0802    ALKPHOS 52 05/05/2025 0913    ALKPHOS 62 11/11/2024 0802    AST 16 05/05/2025 0913    AST 20 11/11/2024 0802    ALT 16 05/05/2025 0913    ALT 16 11/11/2024 0802    BILITOT 0.8 05/05/2025 0913    BILITOT 0.9 11/11/2024 0802    ESTGFRAFRICA >60.0 08/18/2021 0934    EGFRNONAA >60.0 08/18/2021 0934          Lab Results   Component Value Date    HGBA1C 5.6 05/05/2025     No results found for: "TSH"  Lab Results   Component Value Date    INR 1.0 06/24/2025    PROTIME 10.7 06/24/2025     Lab Results   Component Value Date    WBC 8.55 06/24/2025    HGB 13.6 06/24/2025    HCT 41.5 06/24/2025    MCV 85 06/24/2025     06/24/2025     BMP  Sodium   Date Value Ref Range Status   06/24/2025 140 136 - 145 mmol/L Final   11/11/2024 141 136 - 145 mmol/L Final     Potassium   Date Value Ref Range Status   06/24/2025 4.1 3.5 - 5.1 mmol/L Final   11/11/2024 4.1 3.5 - 5.1 mmol/L Final     Chloride   Date Value Ref Range Status   06/24/2025 104 95 - 110 mmol/L Final   11/11/2024 106 95 - 110 mmol/L Final     CO2   Date Value Ref Range Status   06/24/2025 26 23 - 29 mmol/L Final   11/11/2024 28 23 - 29 mmol/L Final     BUN   Date Value Ref Range Status   06/24/2025 13 8 - 23 mg/dL Final     Creatinine   Date Value Ref Range Status   06/24/2025 0.7 0.5 - 1.4 mg/dL Final     Calcium   Date Value Ref Range Status   06/24/2025 9.5 8.7 - 10.5 mg/dL Final   11/11/2024 9.4 8.7 - 10.5 mg/dL Final     Anion Gap   Date Value Ref Range Status   06/24/2025 10 8 - 16 mmol/L Final     eGFR if    Date Value Ref Range Status   08/18/2021 >60.0 >60 mL/min/1.73 m^2 Final     eGFR if non    Date Value Ref Range Status   08/18/2021 >60.0 >60 " mL/min/1.73 m^2 Final     Comment:     Calculation used to obtain the estimated glomerular filtration  rate (eGFR) is the CKD-EPI equation.        BNP  @LABRCNTIP(BNP,BNPTRIAGEBLO)@  @LABRCNTIP(troponini)@  CrCl cannot be calculated (Patient's most recent lab result is older than the maximum 7 days allowed.).  No results found in the last 24 hours.  No results found in the last 24 hours.  No results found in the last 24 hours.    Assessment:      1. Hyperlipidemia associated with type 2 diabetes mellitus    2. Tachycardia    3. Coronary artery disease of native artery of native heart with stable angina pectoris    4. Diabetes mellitus type 2 without retinopathy    5. Other specified disorders of arteries and arterioles        Plan:     Assessment & Plan    IMPRESSION:  - Reviewed recent cardiac workup, including stress test and left heart catheterization.  - Evaluated reported episodes of tachycardia.  - Palpitations of short duration do not require treatment at this time.  - Patient is low risk for heart attack due to known 60% stenosis.    CORONARY ARTERY DISEASE:  - Explained importance of controlling blood pressure, cholesterol, and glucose levels in managing CAD progression.  - Connie to continue weight loss efforts, maintain healthy diet, engage in regular exercise, avoid smoking, and maintain good sleep habits.  - Ordered carotid duplex at this office as a screening test to check for carotid artery stenosis.  - Aspirin 81 mg.    PREDIABETES:  - Metformin for borderline diabetes.    HYPERLIPIDEMIA:  - Pravastatin for cholesterol.    FOLLOW-UP:  - Follow up for annual follow-up with Dr. Cano if carotid duplex results are normal.          Carotid US screen  RTC in  1yr with Dr. Metzger    This note was generated with the assistance of ambient listening technology. Verbal consent was obtained by the patient and accompanying visitor(s) for the recording of patient appointment to facilitate this note. I attest to  having reviewed and edited the generated note for accuracy, though some syntax or spelling errors may persist. Please contact the author of this note for any clarification.              [1]   Social History  Tobacco Use    Smoking status: Never     Passive exposure: Never    Smokeless tobacco: Never   Substance Use Topics    Alcohol use: Yes     Comment: occasionally    Drug use: No

## 2025-08-21 ENCOUNTER — OFFICE VISIT (OUTPATIENT)
Dept: OPHTHALMOLOGY | Facility: CLINIC | Age: 80
End: 2025-08-21
Payer: MEDICARE

## 2025-08-21 DIAGNOSIS — H35.3131 EARLY DRY STAGE NONEXUDATIVE AGE-RELATED MACULAR DEGENERATION OF BOTH EYES: ICD-10-CM

## 2025-08-21 DIAGNOSIS — E11.9 TYPE 2 DIABETES MELLITUS WITHOUT RETINOPATHY: Primary | ICD-10-CM

## 2025-08-21 DIAGNOSIS — Z96.1 PSEUDOPHAKIA OF BOTH EYES: ICD-10-CM

## 2025-08-21 PROCEDURE — 99999 PR PBB SHADOW E&M-EST. PATIENT-LVL III: CPT | Mod: PBBFAC,,, | Performed by: OPHTHALMOLOGY

## (undated) DEVICE — GUIDEWIRE OMNI J TIP 185CM

## (undated) DEVICE — BAND TR COMP DEVICE REG 24CM

## (undated) DEVICE — CATH ANG PIGTAIL 4FR INFINITY

## (undated) DEVICE — KIT MANIFOLD LOW PRESS TUBING

## (undated) DEVICE — CATH JR4 5FR

## (undated) DEVICE — CATH INFINITI MULTIPAK JR4 5FR

## (undated) DEVICE — GUIDEWIRE WHOLEY HI TORQ 175CM

## (undated) DEVICE — KIT SYR REUSABLE

## (undated) DEVICE — Device

## (undated) DEVICE — CATH PIG145 INFINITI 5X110CM

## (undated) DEVICE — WIRE X-SUP CHOICE PT .014X182

## (undated) DEVICE — CATH INFINITI 4F JL4 .042X100

## (undated) DEVICE — CATH JL5 4FR INFINITY

## (undated) DEVICE — ANGIOTOUCH KIT

## (undated) DEVICE — OMNIPAQUE 300MG 150ML VIAL

## (undated) DEVICE — CATH INFINITI 4F 3DRC 100CM

## (undated) DEVICE — PACK HEART CATH BR

## (undated) DEVICE — DRAPE ANGIO BRACH 38X44IN

## (undated) DEVICE — GLIDESHEATH SLENDER SS 5FR10CM

## (undated) DEVICE — CATH JL4 5FR